# Patient Record
Sex: FEMALE | Race: WHITE | NOT HISPANIC OR LATINO | Employment: OTHER | RURAL
[De-identification: names, ages, dates, MRNs, and addresses within clinical notes are randomized per-mention and may not be internally consistent; named-entity substitution may affect disease eponyms.]

---

## 2018-05-09 ENCOUNTER — HISTORICAL (OUTPATIENT)
Dept: ADMINISTRATIVE | Facility: HOSPITAL | Age: 83
End: 2018-05-09

## 2018-05-10 LAB
LAB AP CLINICAL INFORMATION: NORMAL
LAB AP DIAGNOSIS - HISTORICAL: NORMAL
LAB AP GENERAL CAT - HISTORICAL: NEGATIVE
LAB AP PREPARATIONS - HISTORICAL: NORMAL
LAB AP SPECIMEN SUBMITTED - HISTORICAL: NORMAL

## 2020-06-13 ENCOUNTER — HISTORICAL (OUTPATIENT)
Dept: ADMINISTRATIVE | Facility: HOSPITAL | Age: 85
End: 2020-06-13

## 2020-06-13 LAB
ALBUMIN SERPL BCP-MCNC: 3.5 G/DL (ref 3.5–5)
ALBUMIN/GLOB SERPL: 1 {RATIO}
ALP SERPL-CCNC: 71 U/L (ref 55–142)
ALT SERPL W P-5'-P-CCNC: 19 U/L (ref 13–56)
APTT PPP: 38.2 SECONDS (ref 25.2–37.3)
AST SERPL W P-5'-P-CCNC: 21 U/L (ref 15–37)
BASOPHILS # BLD AUTO: 0.04 X10E3/UL (ref 0–0.2)
BASOPHILS NFR BLD AUTO: 0.7 % (ref 0–1)
BILIRUB SERPL-MCNC: 0.4 MG/DL (ref 0–1.2)
BUN SERPL-MCNC: 23 MG/DL (ref 7–18)
BUN/CREAT SERPL: 18.5
CALCIUM SERPL-MCNC: 8.8 MG/DL (ref 8.5–10.1)
CHLORIDE SERPL-SCNC: 101 MMOL/L (ref 98–107)
CK MB SERPL-MCNC: 1 NG/ML (ref 1–3.6)
CK SERPL-CCNC: 99 U/L (ref 26–192)
CO2 SERPL-SCNC: 34 MMOL/L (ref 21–32)
CREAT SERPL-MCNC: 1.24 MG/DL (ref 0.55–1.02)
EOSINOPHIL # BLD AUTO: 0.46 X10E3/UL (ref 0–0.5)
EOSINOPHIL NFR BLD AUTO: 7.8 % (ref 1–4)
ERYTHROCYTE [DISTWIDTH] IN BLOOD BY AUTOMATED COUNT: 13.8 % (ref 11.5–14.5)
GLOBULIN SER-MCNC: 3.4 G/DL (ref 2–4)
GLUCOSE SERPL-MCNC: 113 MG/DL (ref 74–106)
HCT VFR BLD AUTO: 34.1 % (ref 38–47)
HGB BLD-MCNC: 10.4 G/DL (ref 12–16)
IMM GRANULOCYTES # BLD AUTO: 0.03 X10E3/UL (ref 0–0.04)
IMM GRANULOCYTES NFR BLD: 0.5 % (ref 0–0.4)
INR BLD: 1.33 (ref 0–3.3)
LYMPHOCYTES # BLD AUTO: 1.12 X10E3/UL (ref 1–4.8)
LYMPHOCYTES NFR BLD AUTO: 19 % (ref 27–41)
MAGNESIUM SERPL-MCNC: 2.1 MG/DL (ref 1.7–2.3)
MCH RBC QN AUTO: 28 PG (ref 27–31)
MCHC RBC AUTO-ENTMCNC: 30.5 G/DL (ref 32–36)
MCV RBC AUTO: 91.7 FL (ref 80–96)
MONOCYTES # BLD AUTO: 0.68 X10E3/UL (ref 0–0.8)
MONOCYTES NFR BLD AUTO: 11.5 % (ref 2–6)
MPC BLD CALC-MCNC: 9.3 FL (ref 9.4–12.4)
MYOGLOBIN SERPL-MCNC: 71 NG/ML (ref 13–71)
NEUTROPHILS # BLD AUTO: 3.57 X10E3/UL (ref 1.8–7.7)
NEUTROPHILS NFR BLD AUTO: 60.5 % (ref 53–65)
NRBC # BLD AUTO: 0 X10E3/UL (ref 0–0)
NRBC, AUTO (.00): 0 /100 (ref 0–0)
NT-PROBNP SERPL-MCNC: 2503 PG/ML (ref 1–450)
PLATELET # BLD AUTO: 148 X10E3/UL (ref 150–400)
POTASSIUM SERPL-SCNC: 3.7 MMOL/L (ref 3.5–5.1)
PROT SERPL-MCNC: 6.9 G/DL (ref 6.4–8.2)
PROTHROMBIN TIME: 16.6 SECONDS (ref 11.7–14.7)
RBC # BLD AUTO: 3.72 X10E6/UL (ref 4.2–5.4)
SODIUM SERPL-SCNC: 138 MMOL/L (ref 136–145)
TROPONIN I SERPL-MCNC: 0.05 NG/ML (ref 0–0.06)
TSH SERPL DL<=0.005 MIU/L-ACNC: 1.42 UIU/ML (ref 0.36–3.74)
WBC # BLD AUTO: 5.9 X10E3/UL (ref 4.5–11)

## 2020-06-14 ENCOUNTER — HISTORICAL (OUTPATIENT)
Dept: ADMINISTRATIVE | Facility: HOSPITAL | Age: 85
End: 2020-06-14

## 2020-06-14 LAB
BACTERIA #/AREA URNS HPF: ABNORMAL /HPF
BILIRUB UR QL STRIP: ABNORMAL MG/DL
CHOLEST SERPL-MCNC: 139 MG/DL
CHOLEST/HDLC SERPL: 1.8 {RATIO}
CLARITY UR: ABNORMAL
CLARITY UR: ABNORMAL
COLOR UR: ABNORMAL
COLOR UR: ABNORMAL
GLUCOSE UR STRIP-MCNC: NEGATIVE MG/DL
HDLC SERPL-MCNC: 78 MG/DL
KETONES UR STRIP-SCNC: ABNORMAL MG/DL
LDLC SERPL CALC-MCNC: 53 MG/DL
LEUKOCYTE ESTERASE UR QL STRIP: ABNORMAL LEU/UL
NITRITE UR QL STRIP: NEGATIVE
PH UR STRIP: 5 PH UNITS (ref 5–8)
PROT UR QL STRIP: 100 MG/DL
RBC # UR STRIP: ABNORMAL ERY/UL
RBC #/AREA URNS HPF: ABNORMAL /HPF (ref 0–3)
SP GR UR STRIP: 1.02 (ref 1–1.03)
SQUAMOUS #/AREA URNS LPF: ABNORMAL /LPF
TRIGL SERPL-MCNC: 38 MG/DL
TROPONIN I SERPL-MCNC: 0.05 NG/ML (ref 0–0.06)
UROBILINOGEN UR STRIP-ACNC: 0.2 EU/DL
WBC #/AREA URNS HPF: ABNORMAL /HPF (ref 0–5)

## 2020-06-15 ENCOUNTER — HISTORICAL (OUTPATIENT)
Dept: ADMINISTRATIVE | Facility: HOSPITAL | Age: 85
End: 2020-06-15

## 2020-06-15 LAB
BASOPHILS # BLD AUTO: 0.04 X10E3/UL (ref 0–0.2)
BASOPHILS NFR BLD AUTO: 0.7 % (ref 0–1)
BUN SERPL-MCNC: 29 MG/DL (ref 7–18)
CALCIUM SERPL-MCNC: 8.8 MG/DL (ref 8.5–10.1)
CHLORIDE SERPL-SCNC: 101 MMOL/L (ref 98–107)
CHOLEST SERPL-MCNC: 136 MG/DL
CHOLEST/HDLC SERPL: 1.8 {RATIO}
CK MB SERPL-MCNC: <1 NG/ML (ref 1–3.6)
CK SERPL-CCNC: 112 U/L (ref 26–192)
CO2 SERPL-SCNC: 32 MMOL/L (ref 21–32)
CREAT SERPL-MCNC: 1.69 MG/DL (ref 0.55–1.02)
CRP SERPL-MCNC: 10.1 UG/ML (ref 0–0.8)
EOSINOPHIL # BLD AUTO: 0.51 X10E3/UL (ref 0–0.5)
EOSINOPHIL NFR BLD AUTO: 8.7 % (ref 1–4)
ERYTHROCYTE [DISTWIDTH] IN BLOOD BY AUTOMATED COUNT: 14.1 % (ref 11.5–14.5)
ERYTHROCYTE [SEDIMENTATION RATE] IN BLOOD BY WESTERGREN METHOD: 22 MM/HR (ref 0–42)
GLUCOSE SERPL-MCNC: 109 MG/DL (ref 70–105)
GLUCOSE SERPL-MCNC: 98 MG/DL (ref 74–106)
HCT VFR BLD AUTO: 36.8 % (ref 38–47)
HDLC SERPL-MCNC: 75 MG/DL
HGB BLD-MCNC: 10.9 G/DL (ref 12–16)
IMM GRANULOCYTES # BLD AUTO: 0.01 X10E3/UL (ref 0–0.04)
IMM GRANULOCYTES NFR BLD: 0.2 % (ref 0–0.4)
LDLC SERPL CALC-MCNC: 51 MG/DL
LYMPHOCYTES # BLD AUTO: 1.33 X10E3/UL (ref 1–4.8)
LYMPHOCYTES NFR BLD AUTO: 22.7 % (ref 27–41)
MCH RBC QN AUTO: 28.2 PG (ref 27–31)
MCHC RBC AUTO-ENTMCNC: 29.6 G/DL (ref 32–36)
MCV RBC AUTO: 95.1 FL (ref 80–96)
MONOCYTES # BLD AUTO: 0.62 X10E3/UL (ref 0–0.8)
MONOCYTES NFR BLD AUTO: 10.6 % (ref 2–6)
MPC BLD CALC-MCNC: 10 FL (ref 9.4–12.4)
NEUTROPHILS # BLD AUTO: 3.36 X10E3/UL (ref 1.8–7.7)
NEUTROPHILS NFR BLD AUTO: 57.1 % (ref 53–65)
NRBC # BLD AUTO: 0 X10E3/UL (ref 0–0)
NRBC, AUTO (.00): 0 /100 (ref 0–0)
PLATELET # BLD AUTO: 144 X10E3/UL (ref 150–400)
POTASSIUM SERPL-SCNC: 3.4 MMOL/L (ref 3.5–5.1)
RBC # BLD AUTO: 3.87 X10E6/UL (ref 4.2–5.4)
SODIUM SERPL-SCNC: 138 MMOL/L (ref 136–145)
TRIGL SERPL-MCNC: 50 MG/DL
TROPONIN I SERPL-MCNC: 0.04 NG/ML (ref 0–0.06)
WBC # BLD AUTO: 5.87 X10E3/UL (ref 4.5–11)

## 2020-06-18 LAB
REPORT: 38
REPORT: NORMAL

## 2020-10-02 ENCOUNTER — HISTORICAL (OUTPATIENT)
Dept: ADMINISTRATIVE | Facility: HOSPITAL | Age: 85
End: 2020-10-02

## 2020-10-03 ENCOUNTER — HISTORICAL (OUTPATIENT)
Dept: ADMINISTRATIVE | Facility: HOSPITAL | Age: 85
End: 2020-10-03

## 2020-10-05 LAB
APTT PPP: 33.5 SECONDS (ref 25.2–37.3)
INR BLD: 1.21 (ref 0–3.3)
PROTHROMBIN TIME: 14.7 SECONDS (ref 11.7–14.7)

## 2020-10-06 LAB
ABO: NORMAL
ANTIBODY SCREEN: NEGATIVE
RH TYPE: POSITIVE

## 2020-10-09 LAB
ANION GAP SERPL CALCULATED.3IONS-SCNC: 12 MMOL/L
BUN SERPL-MCNC: 36 MG/DL (ref 7–18)
CALCIUM SERPL-MCNC: 8.2 MG/DL (ref 8.5–10.1)
CHLORIDE SERPL-SCNC: 110 MMOL/L (ref 98–107)
CO2 SERPL-SCNC: 27 MMOL/L (ref 21–32)
CREAT SERPL-MCNC: 1.48 MG/DL (ref 0.55–1.02)
GLUCOSE SERPL-MCNC: 134 MG/DL (ref 74–106)
POTASSIUM SERPL-SCNC: 4.7 MMOL/L (ref 3.5–5.1)
SODIUM SERPL-SCNC: 144 MMOL/L (ref 136–145)

## 2020-10-10 LAB
ALBUMIN SERPL BCP-MCNC: 3.8 G/DL (ref 3.5–5)
ALBUMIN SERPL BCP-MCNC: 4 G/DL (ref 3.5–5)
ALBUMIN/GLOB SERPL: 1.1 {RATIO}
ALBUMIN/GLOB SERPL: 1.1 {RATIO}
ALP SERPL-CCNC: 66 U/L (ref 55–142)
ALP SERPL-CCNC: 69 U/L (ref 55–142)
ALT SERPL W P-5'-P-CCNC: 23 U/L (ref 13–56)
ALT SERPL W P-5'-P-CCNC: 24 U/L (ref 13–56)
ANION GAP SERPL CALCULATED.3IONS-SCNC: 15 MMOL/L
ANION GAP SERPL CALCULATED.3IONS-SCNC: 8 MMOL/L
ANISOCYTOSIS BLD QL SMEAR: ABNORMAL
AST SERPL W P-5'-P-CCNC: 25 U/L (ref 15–37)
AST SERPL W P-5'-P-CCNC: 30 U/L (ref 15–37)
BASOPHILS # BLD AUTO: 0 X10E3/UL (ref 0–0.2)
BASOPHILS # BLD AUTO: 0.02 X10E3/UL (ref 0–0.2)
BASOPHILS # BLD AUTO: 0.03 X10E3/UL (ref 0–0.2)
BASOPHILS NFR BLD AUTO: 0 % (ref 0–1)
BASOPHILS NFR BLD AUTO: 0.2 % (ref 0–1)
BASOPHILS NFR BLD AUTO: 0.4 % (ref 0–1)
BILIRUB SERPL-MCNC: 0.3 MG/DL (ref 0–1.2)
BILIRUB SERPL-MCNC: 0.5 MG/DL (ref 0–1.2)
BUN SERPL-MCNC: 41 MG/DL (ref 7–18)
BUN SERPL-MCNC: 42 MG/DL (ref 7–18)
BUN/CREAT SERPL: 20.1
BUN/CREAT SERPL: 20.7
CALCIUM SERPL-MCNC: 8.7 MG/DL (ref 8.5–10.1)
CALCIUM SERPL-MCNC: 8.9 MG/DL (ref 8.5–10.1)
CHLORIDE SERPL-SCNC: 102 MMOL/L (ref 98–107)
CHLORIDE SERPL-SCNC: 104 MMOL/L (ref 98–107)
CO2 SERPL-SCNC: 29 MMOL/L (ref 21–32)
CO2 SERPL-SCNC: 35 MMOL/L (ref 21–32)
CREAT SERPL-MCNC: 2.03 MG/DL (ref 0.55–1.02)
CREAT SERPL-MCNC: 2.04 MG/DL (ref 0.55–1.02)
EOSINOPHIL # BLD AUTO: 0 X10E3/UL (ref 0–0.5)
EOSINOPHIL # BLD AUTO: 0.01 X10E3/UL (ref 0–0.5)
EOSINOPHIL # BLD AUTO: 0.05 X10E3/UL (ref 0–0.5)
EOSINOPHIL NFR BLD AUTO: 0 % (ref 1–4)
EOSINOPHIL NFR BLD AUTO: 0.1 % (ref 1–4)
EOSINOPHIL NFR BLD AUTO: 0.6 % (ref 1–4)
ERYTHROCYTE [DISTWIDTH] IN BLOOD BY AUTOMATED COUNT: 14.2 % (ref 11.5–14.5)
ERYTHROCYTE [DISTWIDTH] IN BLOOD BY AUTOMATED COUNT: 14.3 % (ref 11.5–14.5)
ERYTHROCYTE [DISTWIDTH] IN BLOOD BY AUTOMATED COUNT: 14.5 % (ref 11.5–14.5)
GLOBULIN SER-MCNC: 3.6 G/DL (ref 2–4)
GLOBULIN SER-MCNC: 3.7 G/DL (ref 2–4)
GLUCOSE SERPL-MCNC: 129 MG/DL (ref 74–106)
GLUCOSE SERPL-MCNC: 145 MG/DL (ref 74–106)
HCT VFR BLD AUTO: 32.4 % (ref 38–47)
HCT VFR BLD AUTO: 35.7 % (ref 38–47)
HCT VFR BLD AUTO: 36 % (ref 38–47)
HGB BLD-MCNC: 10 G/DL (ref 12–16)
HGB BLD-MCNC: 11.1 G/DL (ref 12–16)
HGB BLD-MCNC: 11.5 G/DL (ref 12–16)
IMM GRANULOCYTES # BLD AUTO: 0.02 X10E3/UL (ref 0–0.04)
IMM GRANULOCYTES # BLD AUTO: 0.02 X10E3/UL (ref 0–0.04)
IMM GRANULOCYTES # BLD AUTO: 0.03 X10E3/UL (ref 0–0.04)
IMM GRANULOCYTES NFR BLD: 0.2 % (ref 0–0.4)
IMM GRANULOCYTES NFR BLD: 0.3 % (ref 0–0.4)
IMM GRANULOCYTES NFR BLD: 0.6 % (ref 0–0.4)
LYMPHOCYTES # BLD AUTO: 0.6 X10E3/UL (ref 1–4.8)
LYMPHOCYTES # BLD AUTO: 0.86 X10E3/UL (ref 1–4.8)
LYMPHOCYTES # BLD AUTO: 5.86 X10E3/UL (ref 1–4.8)
LYMPHOCYTES NFR BLD AUTO: 10.2 % (ref 27–41)
LYMPHOCYTES NFR BLD AUTO: 12 % (ref 27–41)
LYMPHOCYTES NFR BLD AUTO: 12.7 % (ref 27–41)
LYMPHOCYTES NFR BLD MANUAL: 10 % (ref 27–41)
MCH RBC QN AUTO: 28.4 PG (ref 27–31)
MCH RBC QN AUTO: 28.6 PG (ref 27–31)
MCH RBC QN AUTO: 28.9 PG (ref 27–31)
MCHC RBC AUTO-ENTMCNC: 30.9 G/DL (ref 32–36)
MCHC RBC AUTO-ENTMCNC: 31.1 G/DL (ref 32–36)
MCHC RBC AUTO-ENTMCNC: 31.9 G/DL (ref 32–36)
MCV RBC AUTO: 90.5 FL (ref 80–96)
MCV RBC AUTO: 91.3 FL (ref 80–96)
MCV RBC AUTO: 92.6 FL (ref 80–96)
MONOCYTES # BLD AUTO: 0.38 X10E3/UL (ref 0–0.8)
MONOCYTES # BLD AUTO: 0.47 X10E3/UL (ref 0–0.8)
MONOCYTES # BLD AUTO: 0.5 X10E3/UL (ref 0–0.8)
MONOCYTES NFR BLD AUTO: 10 % (ref 2–6)
MONOCYTES NFR BLD AUTO: 5.3 % (ref 2–6)
MONOCYTES NFR BLD AUTO: 6 % (ref 2–6)
MONOCYTES NFR BLD MANUAL: 7 % (ref 2–6)
MPC BLD CALC-MCNC: 10.1 FL (ref 9.4–12.4)
MPC BLD CALC-MCNC: 9.3 FL (ref 9.4–12.4)
MPC BLD CALC-MCNC: 9.5 FL (ref 9.4–12.4)
NEUTROPHILS # BLD AUTO: 0 X10E3/UL (ref 1.8–7.7)
NEUTROPHILS # BLD AUTO: 3.61 X10E3/UL (ref 1.8–7.7)
NEUTROPHILS # BLD AUTO: 6.95 X10E3/UL (ref 1.8–7.7)
NEUTROPHILS NFR BLD AUTO: 76.7 % (ref 53–65)
NEUTROPHILS NFR BLD AUTO: 81.9 % (ref 53–65)
NEUTROPHILS NFR BLD AUTO: 82.8 % (ref 53–65)
NEUTS BAND NFR BLD MANUAL: 2 % (ref 1–5)
NEUTS SEG NFR BLD MANUAL: 81 % (ref 50–62)
NRBC # BLD AUTO: 0 X10E3/UL (ref 0–0)
NRBC, AUTO (.00): 0 /100 (ref 0–0)
OVALOCYTES BLD QL SMEAR: ABNORMAL
PLATELET # BLD AUTO: 141 X10E3/UL (ref 150–400)
PLATELET # BLD AUTO: 148 X10E3/UL (ref 150–400)
PLATELET # BLD AUTO: 151 X10E3/UL (ref 150–400)
PLATELET MORPHOLOGY: NORMAL
POTASSIUM SERPL-SCNC: 4.1 MMOL/L (ref 3.5–5.1)
POTASSIUM SERPL-SCNC: 4.2 MMOL/L (ref 3.5–5.1)
PROT SERPL-MCNC: 7.4 G/DL (ref 6.4–8.2)
PROT SERPL-MCNC: 7.7 G/DL (ref 6.4–8.2)
RBC # BLD AUTO: 3.5 X10E6/UL (ref 4.2–5.4)
RBC # BLD AUTO: 3.91 X10E6/UL (ref 4.2–5.4)
RBC # BLD AUTO: 3.98 X10E6/UL (ref 4.2–5.4)
SODIUM SERPL-SCNC: 142 MMOL/L (ref 136–145)
SODIUM SERPL-SCNC: 143 MMOL/L (ref 136–145)
WBC # BLD AUTO: 4.71 X10E3/UL (ref 4.5–11)
WBC # BLD AUTO: 7.16 X10E3/UL (ref 4.5–11)
WBC # BLD AUTO: 8.4 X10E3/UL (ref 4.5–11)

## 2020-11-19 ENCOUNTER — HISTORICAL (OUTPATIENT)
Dept: ADMINISTRATIVE | Facility: HOSPITAL | Age: 85
End: 2020-11-19

## 2021-01-05 ENCOUNTER — HISTORICAL (OUTPATIENT)
Dept: ADMINISTRATIVE | Facility: HOSPITAL | Age: 86
End: 2021-01-05

## 2021-01-05 LAB
BACTERIA #/AREA URNS HPF: ABNORMAL /HPF
BASOPHILS # BLD AUTO: 0.05 X10E3/UL (ref 0–0.2)
BASOPHILS NFR BLD AUTO: 0.8 % (ref 0–1)
BILIRUB UR QL STRIP: NEGATIVE MG/DL
CLARITY UR: CLEAR
COLOR UR: YELLOW
EOSINOPHIL # BLD AUTO: 0.32 X10E3/UL (ref 0–0.5)
EOSINOPHIL NFR BLD AUTO: 5.2 % (ref 1–4)
ERYTHROCYTE [DISTWIDTH] IN BLOOD BY AUTOMATED COUNT: 13.2 % (ref 11.5–14.5)
GLUCOSE UR STRIP-MCNC: NEGATIVE MG/DL
HCT VFR BLD AUTO: 33.1 % (ref 38–47)
HGB BLD-MCNC: 10.2 G/DL (ref 12–16)
HYALINE CASTS #/AREA URNS LPF: ABNORMAL /LPF (ref 0–2)
IMM GRANULOCYTES # BLD AUTO: 0.01 X10E3/UL (ref 0–0.04)
IMM GRANULOCYTES NFR BLD: 0.2 % (ref 0–0.4)
KETONES UR STRIP-SCNC: NEGATIVE MG/DL
LEUKOCYTE ESTERASE UR QL STRIP: NEGATIVE LEU/UL
LYMPHOCYTES # BLD AUTO: 1.28 X10E3/UL (ref 1–4.8)
LYMPHOCYTES NFR BLD AUTO: 20.7 % (ref 27–41)
MCH RBC QN AUTO: 28.2 PG (ref 27–31)
MCHC RBC AUTO-ENTMCNC: 30.8 G/DL (ref 32–36)
MCV RBC AUTO: 91.4 FL (ref 80–96)
MONOCYTES # BLD AUTO: 0.58 X10E3/UL (ref 0–0.8)
MONOCYTES NFR BLD AUTO: 9.4 % (ref 2–6)
MPC BLD CALC-MCNC: 9.7 FL (ref 9.4–12.4)
NEUTROPHILS # BLD AUTO: 3.95 X10E3/UL (ref 1.8–7.7)
NEUTROPHILS NFR BLD AUTO: 63.7 % (ref 53–65)
NITRITE UR QL STRIP: NEGATIVE
NRBC # BLD AUTO: 0 X10E3/UL (ref 0–0)
NRBC, AUTO (.00): 0 /100 (ref 0–0)
PH UR STRIP: 5 PH UNITS (ref 5–8)
PLATELET # BLD AUTO: 196 X10E3/UL (ref 150–400)
PROT UR QL STRIP: NEGATIVE MG/DL
RBC # BLD AUTO: 3.62 X10E6/UL (ref 4.2–5.4)
RBC # UR STRIP: ABNORMAL ERY/UL
RBC #/AREA URNS HPF: ABNORMAL /HPF (ref 0–3)
SP GR UR STRIP: 1.01 (ref 1–1.03)
SQUAMOUS #/AREA URNS LPF: ABNORMAL /LPF
UROBILINOGEN UR STRIP-ACNC: 0.2 EU/DL
WBC # BLD AUTO: 6.19 X10E3/UL (ref 4.5–11)
WBC #/AREA URNS HPF: ABNORMAL /HPF (ref 0–5)

## 2021-01-07 LAB
REPORT: NO GROWTH
REPORT: NORMAL

## 2021-03-31 RX ORDER — OLMESARTAN MEDOXOMIL 20 MG/1
20 TABLET ORAL DAILY
COMMUNITY
Start: 2021-03-04 | End: 2021-03-31 | Stop reason: SDUPTHER

## 2021-03-31 RX ORDER — DILTIAZEM HYDROCHLORIDE 180 MG/1
180 CAPSULE, COATED, EXTENDED RELEASE ORAL DAILY
COMMUNITY
Start: 2021-03-04 | End: 2021-03-31 | Stop reason: SDUPTHER

## 2021-03-31 RX ORDER — FUROSEMIDE 40 MG/1
40 TABLET ORAL DAILY
COMMUNITY
Start: 2021-01-26 | End: 2021-03-31 | Stop reason: SDUPTHER

## 2021-03-31 RX ORDER — DIAZEPAM 2 MG/1
TABLET ORAL
COMMUNITY
Start: 2021-03-08 | End: 2021-03-31 | Stop reason: SDUPTHER

## 2021-03-31 RX ORDER — MEMANTINE HYDROCHLORIDE AND DONEPEZIL HYDROCHLORIDE 28; 10 MG/1; MG/1
CAPSULE ORAL
COMMUNITY
Start: 2021-03-04 | End: 2021-03-31 | Stop reason: SDUPTHER

## 2021-03-31 RX ORDER — APIXABAN 2.5 MG/1
2.5 TABLET, FILM COATED ORAL 2 TIMES DAILY
COMMUNITY
Start: 2021-03-08 | End: 2021-03-31 | Stop reason: SDUPTHER

## 2021-03-31 RX ORDER — LEVOTHYROXINE SODIUM 100 UG/1
TABLET ORAL
COMMUNITY
Start: 2021-03-04 | End: 2021-03-31 | Stop reason: SDUPTHER

## 2021-03-31 RX ORDER — ISOSORBIDE MONONITRATE 60 MG/1
60 TABLET, EXTENDED RELEASE ORAL DAILY
COMMUNITY
Start: 2021-03-04 | End: 2021-03-31 | Stop reason: SDUPTHER

## 2021-03-31 RX ORDER — NEBIVOLOL HYDROCHLORIDE 5 MG/1
5 TABLET ORAL DAILY
COMMUNITY
Start: 2021-03-04 | End: 2021-03-31 | Stop reason: SDUPTHER

## 2021-04-01 RX ORDER — DIAZEPAM 2 MG/1
TABLET ORAL
Qty: 90 TABLET | Refills: 1 | Status: SHIPPED | OUTPATIENT
Start: 2021-04-01 | End: 2021-04-06 | Stop reason: SDUPTHER

## 2021-04-01 RX ORDER — DILTIAZEM HYDROCHLORIDE 180 MG/1
180 CAPSULE, COATED, EXTENDED RELEASE ORAL DAILY
Qty: 90 CAPSULE | Refills: 3 | Status: SHIPPED | OUTPATIENT
Start: 2021-04-01 | End: 2022-01-01 | Stop reason: SDUPTHER

## 2021-04-01 RX ORDER — OLMESARTAN MEDOXOMIL 20 MG/1
20 TABLET ORAL DAILY
Qty: 90 TABLET | Refills: 3 | Status: SHIPPED | OUTPATIENT
Start: 2021-04-01 | End: 2022-01-01 | Stop reason: SDUPTHER

## 2021-04-01 RX ORDER — NEBIVOLOL HYDROCHLORIDE 5 MG/1
5 TABLET ORAL DAILY
Qty: 90 TABLET | Refills: 3 | Status: SHIPPED | OUTPATIENT
Start: 2021-04-01 | End: 2022-01-01

## 2021-04-01 RX ORDER — MEMANTINE HYDROCHLORIDE AND DONEPEZIL HYDROCHLORIDE 28; 10 MG/1; MG/1
CAPSULE ORAL
Qty: 90 EACH | Refills: 3 | Status: SHIPPED | OUTPATIENT
Start: 2021-04-01 | End: 2022-01-01

## 2021-04-01 RX ORDER — APIXABAN 2.5 MG/1
2.5 TABLET, FILM COATED ORAL 2 TIMES DAILY
Qty: 90 TABLET | Refills: 11 | Status: SHIPPED | OUTPATIENT
Start: 2021-04-01 | End: 2022-01-01 | Stop reason: SDUPTHER

## 2021-04-01 RX ORDER — LEVOTHYROXINE SODIUM 100 UG/1
TABLET ORAL
Qty: 90 TABLET | Refills: 3 | Status: SHIPPED | OUTPATIENT
Start: 2021-04-01 | End: 2022-01-01 | Stop reason: SDUPTHER

## 2021-04-01 RX ORDER — FUROSEMIDE 40 MG/1
40 TABLET ORAL DAILY
Qty: 90 TABLET | Refills: 3 | Status: SHIPPED | OUTPATIENT
Start: 2021-04-01 | End: 2022-01-01 | Stop reason: SDUPTHER

## 2021-04-01 RX ORDER — ISOSORBIDE MONONITRATE 60 MG/1
60 TABLET, EXTENDED RELEASE ORAL DAILY
Qty: 90 TABLET | Refills: 3 | Status: SHIPPED | OUTPATIENT
Start: 2021-04-01 | End: 2022-01-01 | Stop reason: SDUPTHER

## 2021-04-07 RX ORDER — DIAZEPAM 2 MG/1
TABLET ORAL
Qty: 90 TABLET | Refills: 1 | Status: SHIPPED | OUTPATIENT
Start: 2021-04-07 | End: 2021-10-08 | Stop reason: SDUPTHER

## 2021-10-08 ENCOUNTER — TELEPHONE (OUTPATIENT)
Dept: FAMILY MEDICINE | Facility: CLINIC | Age: 86
End: 2021-10-08

## 2021-10-08 RX ORDER — DIAZEPAM 2 MG/1
TABLET ORAL
Qty: 30 TABLET | Refills: 0 | Status: SHIPPED | OUTPATIENT
Start: 2021-10-08 | End: 2021-10-25 | Stop reason: SDUPTHER

## 2021-10-14 ENCOUNTER — CLINICAL SUPPORT (OUTPATIENT)
Dept: FAMILY MEDICINE | Facility: CLINIC | Age: 86
End: 2021-10-14
Payer: MEDICARE

## 2021-10-14 DIAGNOSIS — Z23 NEED FOR PROPHYLACTIC VACCINATION AND INOCULATION AGAINST CHOLERA ALONE: Primary | ICD-10-CM

## 2021-10-14 PROCEDURE — G0008 FLU VACCINE - QUADRIVALENT - HIGH DOSE (65+) PRESERVATIVE FREE IM: ICD-10-PCS | Mod: ,,, | Performed by: FAMILY MEDICINE

## 2021-10-14 PROCEDURE — G0008 ADMIN INFLUENZA VIRUS VAC: HCPCS | Mod: ,,, | Performed by: FAMILY MEDICINE

## 2021-10-14 PROCEDURE — 90662 FLU VACCINE - QUADRIVALENT - HIGH DOSE (65+) PRESERVATIVE FREE IM: ICD-10-PCS | Mod: ,,, | Performed by: FAMILY MEDICINE

## 2021-10-14 PROCEDURE — 90662 IIV NO PRSV INCREASED AG IM: CPT | Mod: ,,, | Performed by: FAMILY MEDICINE

## 2021-10-25 RX ORDER — DIAZEPAM 2 MG/1
TABLET ORAL
Qty: 30 TABLET | Refills: 1 | Status: SHIPPED | OUTPATIENT
Start: 2021-10-25 | End: 2022-01-17 | Stop reason: SDUPTHER

## 2021-11-11 RX ORDER — AZITHROMYCIN 250 MG/1
250 TABLET, FILM COATED ORAL DAILY
Qty: 6 TABLET | Refills: 0 | Status: SHIPPED | OUTPATIENT
Start: 2021-11-11 | End: 2022-01-01 | Stop reason: SDUPTHER

## 2021-11-11 RX ORDER — AZITHROMYCIN 250 MG/1
250 TABLET, FILM COATED ORAL DAILY
COMMUNITY
End: 2021-11-11 | Stop reason: SDUPTHER

## 2022-01-01 ENCOUNTER — TELEPHONE (OUTPATIENT)
Dept: FAMILY MEDICINE | Facility: CLINIC | Age: 87
End: 2022-01-01
Payer: MEDICARE

## 2022-01-01 ENCOUNTER — OFFICE VISIT (OUTPATIENT)
Dept: FAMILY MEDICINE | Facility: CLINIC | Age: 87
End: 2022-01-01
Payer: MEDICARE

## 2022-01-01 VITALS
DIASTOLIC BLOOD PRESSURE: 60 MMHG | WEIGHT: 115 LBS | BODY MASS INDEX: 21.16 KG/M2 | HEIGHT: 62 IN | RESPIRATION RATE: 18 BRPM | OXYGEN SATURATION: 93 % | HEART RATE: 66 BPM | SYSTOLIC BLOOD PRESSURE: 90 MMHG

## 2022-01-01 DIAGNOSIS — I10 HYPERTENSION, UNSPECIFIED TYPE: ICD-10-CM

## 2022-01-01 DIAGNOSIS — Z71.89 COMPLEX CARE COORDINATION: ICD-10-CM

## 2022-01-01 DIAGNOSIS — Z23 NEED FOR INFLUENZA VACCINATION: ICD-10-CM

## 2022-01-01 DIAGNOSIS — I48.91 ATRIAL FIBRILLATION, UNSPECIFIED TYPE: Primary | ICD-10-CM

## 2022-01-01 DIAGNOSIS — N30.00 ACUTE CYSTITIS WITHOUT HEMATURIA: Primary | ICD-10-CM

## 2022-01-01 DIAGNOSIS — E78.5 HYPERLIPIDEMIA, UNSPECIFIED HYPERLIPIDEMIA TYPE: ICD-10-CM

## 2022-01-01 DIAGNOSIS — K21.9 GASTROESOPHAGEAL REFLUX DISEASE, UNSPECIFIED WHETHER ESOPHAGITIS PRESENT: ICD-10-CM

## 2022-01-01 DIAGNOSIS — E03.9 HYPOTHYROIDISM, UNSPECIFIED TYPE: ICD-10-CM

## 2022-01-01 DIAGNOSIS — I10 ESSENTIAL HYPERTENSION: Primary | ICD-10-CM

## 2022-01-01 DIAGNOSIS — F41.1 GENERALIZED ANXIETY DISORDER: Primary | ICD-10-CM

## 2022-01-01 DIAGNOSIS — F41.1 GENERALIZED ANXIETY DISORDER: ICD-10-CM

## 2022-01-01 DIAGNOSIS — E03.9 HYPOTHYROIDISM, UNSPECIFIED TYPE: Primary | ICD-10-CM

## 2022-01-01 DIAGNOSIS — F03.94 ANXIETY DUE TO DEMENTIA: ICD-10-CM

## 2022-01-01 DIAGNOSIS — I48.91 ATRIAL FIBRILLATION, UNSPECIFIED TYPE: ICD-10-CM

## 2022-01-01 DIAGNOSIS — R30.0 DYSURIA: ICD-10-CM

## 2022-01-01 DIAGNOSIS — I10 ESSENTIAL HYPERTENSION: ICD-10-CM

## 2022-01-01 DIAGNOSIS — Z00.00 ROUTINE MEDICAL EXAM: ICD-10-CM

## 2022-01-01 LAB
ALBUMIN SERPL BCP-MCNC: 3.8 G/DL (ref 3.5–5)
ALBUMIN/GLOB SERPL: 1.2 {RATIO}
ALP SERPL-CCNC: 87 U/L (ref 55–142)
ALT SERPL W P-5'-P-CCNC: 13 U/L (ref 13–56)
ANION GAP SERPL CALCULATED.3IONS-SCNC: 14 MMOL/L (ref 7–16)
AST SERPL W P-5'-P-CCNC: 14 U/L (ref 15–37)
BASOPHILS # BLD AUTO: 0.05 K/UL (ref 0–0.2)
BASOPHILS NFR BLD AUTO: 0.9 % (ref 0–1)
BILIRUB SERPL-MCNC: 0.3 MG/DL (ref ?–1.2)
BUN SERPL-MCNC: 55 MG/DL (ref 7–18)
BUN/CREAT SERPL: 23 (ref 6–20)
CALCIUM SERPL-MCNC: 9.1 MG/DL (ref 8.5–10.1)
CHLORIDE SERPL-SCNC: 99 MMOL/L (ref 98–107)
CO2 SERPL-SCNC: 28 MMOL/L (ref 21–32)
CREAT SERPL-MCNC: 2.44 MG/DL (ref 0.55–1.02)
DIFFERENTIAL METHOD BLD: ABNORMAL
EGFR (NO RACE VARIABLE) (RUSH/TITUS): 18 ML/MIN/1.73M²
EOSINOPHIL # BLD AUTO: 0.45 K/UL (ref 0–0.5)
EOSINOPHIL NFR BLD AUTO: 7.8 % (ref 1–4)
ERYTHROCYTE [DISTWIDTH] IN BLOOD BY AUTOMATED COUNT: 13.3 % (ref 11.5–14.5)
GLOBULIN SER-MCNC: 3.3 G/DL (ref 2–4)
GLUCOSE SERPL-MCNC: 152 MG/DL (ref 74–106)
HCT VFR BLD AUTO: 37.3 % (ref 38–47)
HGB BLD-MCNC: 11.5 G/DL (ref 12–16)
IMM GRANULOCYTES # BLD AUTO: 0.01 K/UL (ref 0–0.04)
IMM GRANULOCYTES NFR BLD: 0.2 % (ref 0–0.4)
LYMPHOCYTES # BLD AUTO: 1.51 K/UL (ref 1–4.8)
LYMPHOCYTES NFR BLD AUTO: 26.1 % (ref 27–41)
MCH RBC QN AUTO: 29 PG (ref 27–31)
MCHC RBC AUTO-ENTMCNC: 30.8 G/DL (ref 32–36)
MCV RBC AUTO: 94 FL (ref 80–96)
MONOCYTES # BLD AUTO: 0.4 K/UL (ref 0–0.8)
MONOCYTES NFR BLD AUTO: 6.9 % (ref 2–6)
MPC BLD CALC-MCNC: 9.6 FL (ref 9.4–12.4)
NEUTROPHILS # BLD AUTO: 3.36 K/UL (ref 1.8–7.7)
NEUTROPHILS NFR BLD AUTO: 58.1 % (ref 53–65)
NRBC # BLD AUTO: 0 X10E3/UL
NRBC, AUTO (.00): 0 %
PLATELET # BLD AUTO: 207 K/UL (ref 150–400)
POTASSIUM SERPL-SCNC: 4 MMOL/L (ref 3.5–5.1)
PROT SERPL-MCNC: 7.1 G/DL (ref 6.4–8.2)
RBC # BLD AUTO: 3.97 M/UL (ref 4.2–5.4)
SODIUM SERPL-SCNC: 137 MMOL/L (ref 136–145)
T4 FREE SERPL-MCNC: 0.89 NG/DL (ref 0.76–1.46)
TSH SERPL DL<=0.005 MIU/L-ACNC: 22.3 UIU/ML (ref 0.36–3.74)
UA COMPLETE W REFLEX CULTURE PNL UR: ABNORMAL
WBC # BLD AUTO: 5.78 K/UL (ref 4.5–11)

## 2022-01-01 PROCEDURE — 84443 THYROID PANEL: ICD-10-PCS | Mod: ,,, | Performed by: CLINICAL MEDICAL LABORATORY

## 2022-01-01 PROCEDURE — 87186 SC STD MICRODIL/AGAR DIL: CPT | Mod: ,,, | Performed by: CLINICAL MEDICAL LABORATORY

## 2022-01-01 PROCEDURE — 85025 CBC WITH DIFFERENTIAL: ICD-10-PCS | Mod: ,,, | Performed by: CLINICAL MEDICAL LABORATORY

## 2022-01-01 PROCEDURE — 87086 CULTURE, URINE: ICD-10-PCS | Mod: ,,, | Performed by: CLINICAL MEDICAL LABORATORY

## 2022-01-01 PROCEDURE — 87086 URINE CULTURE/COLONY COUNT: CPT | Mod: ,,, | Performed by: CLINICAL MEDICAL LABORATORY

## 2022-01-01 PROCEDURE — 80053 COMPREHEN METABOLIC PANEL: CPT | Mod: ,,, | Performed by: CLINICAL MEDICAL LABORATORY

## 2022-01-01 PROCEDURE — 84439 THYROID PANEL: ICD-10-PCS | Mod: ,,, | Performed by: CLINICAL MEDICAL LABORATORY

## 2022-01-01 PROCEDURE — 99214 PR OFFICE/OUTPT VISIT, EST, LEVL IV, 30-39 MIN: ICD-10-PCS | Mod: ,,, | Performed by: FAMILY MEDICINE

## 2022-01-01 PROCEDURE — 85025 COMPLETE CBC W/AUTO DIFF WBC: CPT | Mod: ,,, | Performed by: CLINICAL MEDICAL LABORATORY

## 2022-01-01 PROCEDURE — 80053 COMPREHENSIVE METABOLIC PANEL: ICD-10-PCS | Mod: ,,, | Performed by: CLINICAL MEDICAL LABORATORY

## 2022-01-01 PROCEDURE — 84443 ASSAY THYROID STIM HORMONE: CPT | Mod: ,,, | Performed by: CLINICAL MEDICAL LABORATORY

## 2022-01-01 PROCEDURE — 90694 FLU VACCINE - QUADRIVALENT - ADJUVANTED: ICD-10-PCS | Mod: ,,, | Performed by: FAMILY MEDICINE

## 2022-01-01 PROCEDURE — 87077 CULTURE AEROBIC IDENTIFY: CPT | Mod: ,,, | Performed by: CLINICAL MEDICAL LABORATORY

## 2022-01-01 PROCEDURE — 90694 VACC AIIV4 NO PRSRV 0.5ML IM: CPT | Mod: ,,, | Performed by: FAMILY MEDICINE

## 2022-01-01 PROCEDURE — G0008 ADMIN INFLUENZA VIRUS VAC: HCPCS | Mod: ,,, | Performed by: FAMILY MEDICINE

## 2022-01-01 PROCEDURE — 87186 CULTURE, URINE: ICD-10-PCS | Mod: ,,, | Performed by: CLINICAL MEDICAL LABORATORY

## 2022-01-01 PROCEDURE — 99214 OFFICE O/P EST MOD 30 MIN: CPT | Mod: ,,, | Performed by: FAMILY MEDICINE

## 2022-01-01 PROCEDURE — G0008 FLU VACCINE - QUADRIVALENT - ADJUVANTED: ICD-10-PCS | Mod: ,,, | Performed by: FAMILY MEDICINE

## 2022-01-01 PROCEDURE — 84439 ASSAY OF FREE THYROXINE: CPT | Mod: ,,, | Performed by: CLINICAL MEDICAL LABORATORY

## 2022-01-01 PROCEDURE — 87077 CULTURE, URINE: ICD-10-PCS | Mod: ,,, | Performed by: CLINICAL MEDICAL LABORATORY

## 2022-01-01 RX ORDER — LEVOTHYROXINE SODIUM 100 UG/1
TABLET ORAL
Qty: 90 TABLET | Refills: 3 | Status: ON HOLD | OUTPATIENT
Start: 2022-01-01 | End: 2023-01-01 | Stop reason: HOSPADM

## 2022-01-01 RX ORDER — EZETIMIBE AND SIMVASTATIN 10; 20 MG/1; MG/1
TABLET ORAL
Qty: 90 TABLET | Refills: 3 | Status: ON HOLD | OUTPATIENT
Start: 2022-01-01 | End: 2023-01-01 | Stop reason: SDUPTHER

## 2022-01-01 RX ORDER — DONEPEZIL HYDROCHLORIDE 10 MG/1
10 TABLET, FILM COATED ORAL NIGHTLY
COMMUNITY
End: 2022-01-01 | Stop reason: SDUPTHER

## 2022-01-01 RX ORDER — DONEPEZIL HYDROCHLORIDE 10 MG/1
10 TABLET, FILM COATED ORAL NIGHTLY
Qty: 90 TABLET | Refills: 3 | Status: SHIPPED | OUTPATIENT
Start: 2022-01-01

## 2022-01-01 RX ORDER — AZITHROMYCIN 250 MG/1
250 TABLET, FILM COATED ORAL DAILY
Qty: 6 TABLET | Refills: 0 | Status: ON HOLD | OUTPATIENT
Start: 2022-01-01 | End: 2023-01-01 | Stop reason: HOSPADM

## 2022-01-01 RX ORDER — OLMESARTAN MEDOXOMIL 20 MG/1
20 TABLET ORAL DAILY
Qty: 90 TABLET | Refills: 3 | Status: ON HOLD | OUTPATIENT
Start: 2022-01-01 | End: 2023-01-01 | Stop reason: SDUPTHER

## 2022-01-01 RX ORDER — FUROSEMIDE 40 MG/1
40 TABLET ORAL DAILY
Qty: 90 TABLET | Refills: 3 | Status: SHIPPED | OUTPATIENT
Start: 2022-01-01 | End: 2022-01-01 | Stop reason: SDUPTHER

## 2022-01-01 RX ORDER — POTASSIUM CHLORIDE 750 MG/1
10 TABLET, EXTENDED RELEASE ORAL DAILY
Qty: 90 TABLET | Refills: 3 | Status: ON HOLD | OUTPATIENT
Start: 2022-01-01 | End: 2023-01-01 | Stop reason: HOSPADM

## 2022-01-01 RX ORDER — DIAZEPAM 2 MG/1
TABLET ORAL
Qty: 30 TABLET | Refills: 5 | Status: SHIPPED | OUTPATIENT
Start: 2022-01-01 | End: 2022-01-01 | Stop reason: SDUPTHER

## 2022-01-01 RX ORDER — MEMANTINE HYDROCHLORIDE 28 MG/1
28 CAPSULE, EXTENDED RELEASE ORAL DAILY
Qty: 30 CAPSULE | Refills: 11 | Status: SHIPPED | OUTPATIENT
Start: 2022-01-01 | End: 2022-01-01 | Stop reason: SDUPTHER

## 2022-01-01 RX ORDER — ISOSORBIDE MONONITRATE 60 MG/1
60 TABLET, EXTENDED RELEASE ORAL DAILY
Qty: 90 TABLET | Refills: 3 | Status: SHIPPED | OUTPATIENT
Start: 2022-01-01 | End: 2022-01-01 | Stop reason: SDUPTHER

## 2022-01-01 RX ORDER — POTASSIUM CHLORIDE 750 MG/1
10 TABLET, EXTENDED RELEASE ORAL DAILY
Qty: 90 TABLET | Refills: 3 | Status: SHIPPED | OUTPATIENT
Start: 2022-01-01 | End: 2022-01-01 | Stop reason: SDUPTHER

## 2022-01-01 RX ORDER — DIAZEPAM 2 MG/1
TABLET ORAL
Qty: 90 TABLET | Refills: 1 | Status: ON HOLD | OUTPATIENT
Start: 2022-01-01 | End: 2023-01-01 | Stop reason: HOSPADM

## 2022-01-01 RX ORDER — EZETIMIBE AND SIMVASTATIN 10; 20 MG/1; MG/1
TABLET ORAL
Qty: 90 TABLET | Refills: 3 | Status: SHIPPED | OUTPATIENT
Start: 2022-01-01 | End: 2022-01-01 | Stop reason: SDUPTHER

## 2022-01-01 RX ORDER — DILTIAZEM HYDROCHLORIDE 180 MG/1
180 CAPSULE, COATED, EXTENDED RELEASE ORAL DAILY
Qty: 90 CAPSULE | Refills: 3 | Status: ON HOLD | OUTPATIENT
Start: 2022-01-01 | End: 2023-01-01 | Stop reason: SDUPTHER

## 2022-01-01 RX ORDER — NEBIVOLOL 5 MG/1
5 TABLET ORAL EVERY MORNING
COMMUNITY
Start: 2022-01-01 | End: 2022-01-01 | Stop reason: SDUPTHER

## 2022-01-01 RX ORDER — SERTRALINE HYDROCHLORIDE 50 MG/1
50 TABLET, FILM COATED ORAL DAILY
Qty: 90 TABLET | Refills: 3 | Status: SHIPPED | OUTPATIENT
Start: 2022-01-01

## 2022-01-01 RX ORDER — DONEPEZIL HYDROCHLORIDE 10 MG/1
10 TABLET, FILM COATED ORAL NIGHTLY
Qty: 30 TABLET | Refills: 11 | Status: SHIPPED | OUTPATIENT
Start: 2022-01-01 | End: 2022-01-01 | Stop reason: SDUPTHER

## 2022-01-01 RX ORDER — MEMANTINE HYDROCHLORIDE AND DONEPEZIL HYDROCHLORIDE 28; 10 MG/1; MG/1
1 CAPSULE ORAL NIGHTLY
Qty: 90 EACH | Refills: 3 | Status: ON HOLD | OUTPATIENT
Start: 2022-01-01 | End: 2023-01-01 | Stop reason: SDUPTHER

## 2022-01-01 RX ORDER — SERTRALINE HYDROCHLORIDE 50 MG/1
50 TABLET, FILM COATED ORAL DAILY
Qty: 60 TABLET | Refills: 3 | Status: SHIPPED | OUTPATIENT
Start: 2022-01-01 | End: 2022-01-01 | Stop reason: SDUPTHER

## 2022-01-01 RX ORDER — MEMANTINE HYDROCHLORIDE 28 MG/1
28 CAPSULE, EXTENDED RELEASE ORAL DAILY
Qty: 90 CAPSULE | Refills: 3 | Status: SHIPPED | OUTPATIENT
Start: 2022-01-01

## 2022-01-01 RX ORDER — APIXABAN 2.5 MG/1
2.5 TABLET, FILM COATED ORAL 2 TIMES DAILY
Qty: 180 TABLET | Refills: 3 | Status: SHIPPED | OUTPATIENT
Start: 2022-01-01

## 2022-01-01 RX ORDER — PANTOPRAZOLE SODIUM 40 MG/1
TABLET, DELAYED RELEASE ORAL
Qty: 90 TABLET | Refills: 3 | Status: SHIPPED | OUTPATIENT
Start: 2022-01-01 | End: 2022-01-01 | Stop reason: SDUPTHER

## 2022-01-01 RX ORDER — LEVOTHYROXINE SODIUM 100 UG/1
TABLET ORAL
Qty: 90 TABLET | Refills: 3 | Status: SHIPPED | OUTPATIENT
Start: 2022-01-01 | End: 2022-01-01 | Stop reason: SDUPTHER

## 2022-01-01 RX ORDER — LEVOTHYROXINE SODIUM 112 UG/1
112 TABLET ORAL
Qty: 30 TABLET | Refills: 11 | Status: SHIPPED | OUTPATIENT
Start: 2022-01-01 | End: 2023-10-18

## 2022-01-01 RX ORDER — APIXABAN 2.5 MG/1
2.5 TABLET, FILM COATED ORAL 2 TIMES DAILY
Qty: 60 TABLET | Refills: 11 | Status: SHIPPED | OUTPATIENT
Start: 2022-01-01 | End: 2022-01-01 | Stop reason: SDUPTHER

## 2022-01-01 RX ORDER — PANTOPRAZOLE SODIUM 40 MG/1
TABLET, DELAYED RELEASE ORAL
Qty: 90 TABLET | Refills: 3 | Status: ON HOLD | OUTPATIENT
Start: 2022-01-01 | End: 2023-01-01 | Stop reason: SDUPTHER

## 2022-01-01 RX ORDER — MEMANTINE HYDROCHLORIDE 28 MG/1
CAPSULE, EXTENDED RELEASE ORAL
COMMUNITY
End: 2022-01-01 | Stop reason: SDUPTHER

## 2022-01-01 RX ORDER — ISOSORBIDE MONONITRATE 60 MG/1
60 TABLET, EXTENDED RELEASE ORAL DAILY
Qty: 90 TABLET | Refills: 3 | Status: ON HOLD | OUTPATIENT
Start: 2022-01-01 | End: 2023-01-01 | Stop reason: SDUPTHER

## 2022-01-01 RX ORDER — FUROSEMIDE 40 MG/1
40 TABLET ORAL DAILY
Qty: 90 TABLET | Refills: 3 | Status: ON HOLD | OUTPATIENT
Start: 2022-01-01 | End: 2023-01-01 | Stop reason: SDUPTHER

## 2022-01-01 RX ORDER — CEPHALEXIN 500 MG/1
500 CAPSULE ORAL EVERY 12 HOURS
Qty: 10 CAPSULE | Refills: 0 | Status: ON HOLD | OUTPATIENT
Start: 2022-01-01 | End: 2023-01-01 | Stop reason: HOSPADM

## 2022-01-01 RX ORDER — SERTRALINE HYDROCHLORIDE 50 MG/1
50 TABLET, FILM COATED ORAL DAILY
COMMUNITY
Start: 2022-01-15 | End: 2022-01-01 | Stop reason: SDUPTHER

## 2022-01-01 RX ORDER — OLMESARTAN MEDOXOMIL 20 MG/1
20 TABLET ORAL DAILY
Qty: 90 TABLET | Refills: 3 | Status: SHIPPED | OUTPATIENT
Start: 2022-01-01 | End: 2022-01-01 | Stop reason: SDUPTHER

## 2022-01-01 RX ORDER — NEBIVOLOL 5 MG/1
5 TABLET ORAL EVERY MORNING
Qty: 90 TABLET | Refills: 3 | Status: ON HOLD | OUTPATIENT
Start: 2022-01-01 | End: 2023-01-01 | Stop reason: SDUPTHER

## 2022-01-01 RX ORDER — NEBIVOLOL 5 MG/1
5 TABLET ORAL EVERY MORNING
Qty: 90 TABLET | Refills: 3 | Status: SHIPPED | OUTPATIENT
Start: 2022-01-01 | End: 2022-01-01 | Stop reason: SDUPTHER

## 2022-01-01 RX ORDER — POTASSIUM CHLORIDE 750 MG/1
10 TABLET, EXTENDED RELEASE ORAL DAILY
COMMUNITY
Start: 2022-01-15 | End: 2022-01-01 | Stop reason: SDUPTHER

## 2022-01-17 RX ORDER — DIAZEPAM 2 MG/1
TABLET ORAL
Qty: 30 TABLET | Refills: 1 | Status: SHIPPED | OUTPATIENT
Start: 2022-01-17 | End: 2022-01-01 | Stop reason: SDUPTHER

## 2022-03-16 NOTE — TELEPHONE ENCOUNTER
----- Message from Monica Mcneil sent at 3/16/2022  2:23 PM CDT -----  Regarding: Med Refill  Contact: Daughter-Stephanie Ramos  Patient daughter called and stated her mom needs a refill on her Diazepam and Bystolic called into [facility] her  10/18/25. She left a message and then she called me.

## 2022-05-13 NOTE — TELEPHONE ENCOUNTER
Patient requesting to change the namzaric 28-10mg. What do you want her to replace this with? Would it be Aircept and Namenda?    Also refills on imdur and ezetimibe-simvastatin

## 2022-10-14 NOTE — PROGRESS NOTES
Andry Galarza MD        PATIENT NAME: Kymberly Franco  : 10/18/1925  DATE: 10/14/22  MRN: [id number]      Billing Provider: Andry Galarza MD  Level of Service: NV OFFICE/OUTPT VISIT, EST, LEVL IV, 30-39 MIN  Patient PCP Information       Provider PCP Type    Andry Galarza MD General            Reason for Visit / Chief Complaint: Annual Exam (Patient's daughter brought urine. Placed urine in urine cup and gave to lab)       Update PCP  Update Chief Complaint         History of Present Illness / Problem Focused Workflow     Kymberly Franco presents to the clinic with Annual Exam (Patient's daughter brought urine. Placed urine in urine cup and gave to lab)     Brought by daughter.  Routine followup.      Review of Systems     Review of Systems   Constitutional:  Positive for fatigue. Negative for activity change, appetite change, fever and unexpected weight change.   HENT:  Negative for congestion, rhinorrhea, sinus pressure, sinus pain, sore throat and trouble swallowing.    Eyes:  Negative for photophobia, pain, discharge and visual disturbance.   Respiratory:  Negative for cough, chest tightness, wheezing and stridor.    Cardiovascular:  Negative for chest pain, palpitations and leg swelling.   Gastrointestinal:  Negative for abdominal pain, blood in stool, constipation, diarrhea and nausea.   Endocrine: Negative for polydipsia, polyphagia and polyuria.   Genitourinary:  Negative for difficulty urinating, flank pain and hematuria.   Musculoskeletal:  Positive for gait problem. Negative for arthralgias and neck pain.   Skin:  Negative for rash.   Allergic/Immunologic: Negative for food allergies.   Neurological:  Negative for dizziness, tremors, seizures, syncope, weakness (global weakness) and headaches.   Psychiatric/Behavioral:  Negative for behavioral problems, confusion, decreased concentration, dysphoric mood and hallucinations. The patient is not nervous/anxious.       Medical / Social / Family History    History reviewed. No pertinent past medical history.    History reviewed. No pertinent surgical history.    Social History  Ms.      Family History  Ms.'s family history is not on file.    Medications and Allergies     Medications  No outpatient medications have been marked as taking for the 10/14/22 encounter (Office Visit) with Andry Galarza MD.       Allergies  Review of patient's allergies indicates:   Allergen Reactions    Cipro [ciprofloxacin hcl]     Pcn [penicillins] Hives and Itching       Physical Examination     Vitals:    10/14/22 1335   BP: 90/60   Pulse: 66   Resp: 18     Physical Exam  Constitutional:       General: She is not in acute distress.     Appearance: Normal appearance.   HENT:      Head: Normocephalic.      Right Ear: Tympanic membrane and ear canal normal.      Left Ear: Tympanic membrane and ear canal normal.      Nose: Nose normal.      Mouth/Throat:      Mouth: Mucous membranes are moist.      Pharynx: No oropharyngeal exudate.   Eyes:      Extraocular Movements: Extraocular movements intact.      Pupils: Pupils are equal, round, and reactive to light.   Cardiovascular:      Rate and Rhythm: Normal rate and regular rhythm.      Heart sounds: No murmur heard.  Pulmonary:      Effort: Pulmonary effort is normal.      Breath sounds: Normal breath sounds. No wheezing.   Abdominal:      General: Abdomen is flat. Bowel sounds are normal.      Palpations: Abdomen is soft.      Hernia: No hernia is present.   Musculoskeletal:      Cervical back: Normal range of motion and neck supple.      Right lower leg: No edema.      Left lower leg: No edema.      Comments: Seated in a wheelchair   Lymphadenopathy:      Cervical: No cervical adenopathy.   Skin:     General: Skin is warm and dry.      Coloration: Skin is not jaundiced.      Findings: No lesion.   Neurological:      General: No focal deficit present.      Mental Status: She is alert and oriented to person, place, and time.      Cranial  Nerves: No cranial nerve deficit.      Gait: Gait normal.   Psychiatric:         Mood and Affect: Mood normal.         Behavior: Behavior normal.         Judgment: Judgment normal.        Assessment and Plan (including Health Maintenance)      Problem List  Smart Sets  Document Outside HM   :    Plan:   Essential hypertension  -     nebivoloL (BYSTOLIC) 5 MG Tab; Take 1 tablet (5 mg total) by mouth every morning.  Dispense: 90 tablet; Refill: 3  -     CBC Auto Differential; Future; Expected date: 04/14/2023  -     Comprehensive Metabolic Panel; Future; Expected date: 04/14/2023    Routine medical exam  -     Cancel: Urinalysis, Reflex to Urine Culture    Generalized anxiety disorder  -     diazePAM (VALIUM) 2 MG tablet; TAKE 1 TABLET BY MOUTH EACH NIGHT AT BEDTIME ..MAY CAUSE DROWSINESS.. NO ALCOHOL  Dispense: 90 tablet; Refill: 1    Atrial fibrillation, unspecified type  -     ELIQUIS 2.5 mg Tab; Take 1 tablet (2.5 mg total) by mouth 2 (two) times daily.  Dispense: 180 tablet; Refill: 3  -     furosemide (LASIX) 40 MG tablet; Take 1 tablet (40 mg total) by mouth once daily.  Dispense: 90 tablet; Refill: 3  -     isosorbide mononitrate (IMDUR) 60 MG 24 hr tablet; Take 1 tablet (60 mg total) by mouth once daily.  Dispense: 90 tablet; Refill: 3    Hyperlipidemia, unspecified hyperlipidemia type  -     ezetimibe-simvastatin 10-20 mg (VYTORIN) 10-20 mg per tablet; TAKE 1 TABLET BY MOUTH DAILY ..DO NOT EAT GRAPEFRUIT OR DRINK GRAPEFRUIT JUICE WHILE TAKING..  Dispense: 90 tablet; Refill: 3    Hypertension, unspecified type  -     olmesartan (BENICAR) 20 MG tablet; Take 1 tablet (20 mg total) by mouth once daily.  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  Dispense: 90 tablet; Refill: 3    Hypothyroidism, unspecified type  -     Thyroid Panel; Future; Expected date: 10/14/2022    Need for influenza vaccination  -     Influenza - Quadrivalent (Adjuvanted)    Dysuria  -     Urine culture    Other orders  -     diltiaZEM (CARDIZEM CD) 180 MG 24 hr capsule; Take 1 capsule (180 mg total) by mouth once daily.  Dispense: 90 capsule; Refill: 3  -     donepeziL (ARICEPT) 10 MG tablet; Take 1 tablet (10 mg total) by mouth every evening.  Dispense: 90 tablet; Refill: 3  -     levothyroxine (SYNTHROID) 100 MCG tablet; TAKE 1 TABLET BY MOUTH DAILY ON EMPTY STOMACH  Dispense: 90 tablet; Refill: 3  -     memantine (NAMENDA XR) 28 mg CSpX; Take 1 capsule (28 mg total) by mouth once daily.  Dispense: 90 capsule; Refill: 3  -     NAMZARIC 28-10 mg CSpX; Take 1 capsule by mouth every evening.  Dispense: 90 each; Refill: 3  -     potassium chloride SA (K-DUR,KLOR-CON M) 10 MEQ tablet; Take 1 tablet (10 mEq total) by mouth once daily.  Dispense: 90 tablet; Refill: 3         Health Maintenance Due   Topic Date Due    Lipid Panel  Never done    COVID-19 Vaccine (1) Never done    TETANUS VACCINE  Never done    Shingles Vaccine (1 of 2) Never done    Pneumococcal Vaccines (Age 65+) (1 - PCV) Never done    Influenza Vaccine (1) 09/01/2022       Problem List Items Addressed This Visit    None  Visit Diagnoses       Essential hypertension    -  Primary    Relevant Medications    nebivoloL (BYSTOLIC) 5 MG Tab    Other Relevant Orders    CBC Auto Differential (Completed)    Comprehensive Metabolic Panel (Completed)    Routine medical exam        Generalized anxiety disorder        Relevant Medications    diazePAM (VALIUM) 2 MG tablet    Atrial fibrillation, unspecified type        Relevant Medications    ELIQUIS 2.5 mg Tab    furosemide (LASIX) 40 MG tablet    isosorbide mononitrate (IMDUR) 60 MG 24 hr tablet    Hyperlipidemia, unspecified hyperlipidemia  type        Relevant Medications    ezetimibe-simvastatin 10-20 mg (VYTORIN) 10-20 mg per tablet    Hypertension, unspecified type        Relevant Medications    olmesartan (BENICAR) 20 MG tablet    Gastroesophageal reflux disease, unspecified whether esophagitis present        Relevant Medications    pantoprazole (PROTONIX) 40 MG tablet    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     There are no Patient Instructions on file for this visit.  Follow up if symptoms worsen or fail to improve.     Signature:  Andry Galarza MD      Date of encounter: 10/14/22

## 2022-10-19 NOTE — TELEPHONE ENCOUNTER
Phone follow-up with Stephanie,  Mrs Borrero's daughter.  I informed her that she does have urinary tract infection sensitive to all antibiotics.  However she now has no symptoms.  Cautioned Adelfo that if she develops symptoms to give me a call and we would start Bactrim double strength twice daily for 3 days.

## 2022-11-29 NOTE — TELEPHONE ENCOUNTER
----- Message from Enriqueta Oh sent at 11/29/2022  2:12 PM CST -----  PT IS HAVING SYMPTOMS OF UTI AND DR. DEMPSEY TOLD THEM TO CALL BACK IF SHE WAS HAVING SYMPTOMS TO GET SOME MEDICATION.  [facility].

## 2023-01-01 ENCOUNTER — HOSPITAL ENCOUNTER (INPATIENT)
Facility: HOSPITAL | Age: 88
LOS: 11 days | Discharge: NURSING, REHAB OR LONG-TERM CARE | DRG: 480 | End: 2023-02-06
Attending: EMERGENCY MEDICINE | Admitting: STUDENT IN AN ORGANIZED HEALTH CARE EDUCATION/TRAINING PROGRAM
Payer: MEDICARE

## 2023-01-01 ENCOUNTER — HOSPITAL ENCOUNTER (INPATIENT)
Facility: HOSPITAL | Age: 88
LOS: 8 days | DRG: 871 | End: 2023-02-14
Attending: INTERNAL MEDICINE | Admitting: INTERNAL MEDICINE
Payer: MEDICARE

## 2023-01-01 ENCOUNTER — ANESTHESIA (OUTPATIENT)
Dept: SURGERY | Facility: HOSPITAL | Age: 88
DRG: 480 | End: 2023-01-01
Payer: MEDICARE

## 2023-01-01 ENCOUNTER — ANESTHESIA EVENT (OUTPATIENT)
Dept: SURGERY | Facility: HOSPITAL | Age: 88
DRG: 480 | End: 2023-01-01
Payer: MEDICARE

## 2023-01-01 VITALS
DIASTOLIC BLOOD PRESSURE: 78 MMHG | BODY MASS INDEX: 21.17 KG/M2 | WEIGHT: 115.06 LBS | OXYGEN SATURATION: 95 % | TEMPERATURE: 99 F | SYSTOLIC BLOOD PRESSURE: 138 MMHG | RESPIRATION RATE: 28 BRPM | HEART RATE: 138 BPM | HEIGHT: 62 IN

## 2023-01-01 VITALS
RESPIRATION RATE: 12 BRPM | DIASTOLIC BLOOD PRESSURE: 38 MMHG | SYSTOLIC BLOOD PRESSURE: 72 MMHG | HEIGHT: 62 IN | OXYGEN SATURATION: 88 % | BODY MASS INDEX: 27.83 KG/M2 | HEART RATE: 120 BPM | TEMPERATURE: 100 F | WEIGHT: 151.25 LBS

## 2023-01-01 DIAGNOSIS — Z51.5 HOSPICE CARE: ICD-10-CM

## 2023-01-01 DIAGNOSIS — I48.0 PAROXYSMAL ATRIAL FIBRILLATION: ICD-10-CM

## 2023-01-01 DIAGNOSIS — S72.142A CLOSED DISPLACED INTERTROCHANTERIC FRACTURE OF LEFT FEMUR, INITIAL ENCOUNTER: ICD-10-CM

## 2023-01-01 DIAGNOSIS — K21.9 GASTROESOPHAGEAL REFLUX DISEASE, UNSPECIFIED WHETHER ESOPHAGITIS PRESENT: ICD-10-CM

## 2023-01-01 DIAGNOSIS — I48.91 ATRIAL FIBRILLATION, UNSPECIFIED TYPE: ICD-10-CM

## 2023-01-01 DIAGNOSIS — S72.143A INTERTROCHANTERIC FRACTURE OF FEMUR: ICD-10-CM
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
## 2023-01-01 DIAGNOSIS — I10 HYPERTENSION, UNSPECIFIED TYPE: ICD-10-CM

## 2023-01-01 DIAGNOSIS — G93.41 ENCEPHALOPATHY, METABOLIC: ICD-10-CM

## 2023-01-01 DIAGNOSIS — I48.91 ATRIAL FIBRILLATION WITH TACHYCARDIC VENTRICULAR RATE: ICD-10-CM

## 2023-01-01 DIAGNOSIS — E78.5 HYPERLIPIDEMIA, UNSPECIFIED HYPERLIPIDEMIA TYPE: ICD-10-CM

## 2023-01-01 DIAGNOSIS — G30.1 MODERATE LATE ONSET ALZHEIMER'S DEMENTIA WITH ANXIETY: ICD-10-CM

## 2023-01-01 DIAGNOSIS — E07.9 THYROID DISEASE: ICD-10-CM

## 2023-01-01 DIAGNOSIS — Z01.811 PRE-OP CHEST EXAM: ICD-10-CM

## 2023-01-01 DIAGNOSIS — I63.9 CVA (CEREBRAL VASCULAR ACCIDENT): Primary | ICD-10-CM

## 2023-01-01 DIAGNOSIS — E78.2 MIXED HYPERLIPIDEMIA: ICD-10-CM

## 2023-01-01 DIAGNOSIS — F02.B4 MODERATE LATE ONSET ALZHEIMER'S DEMENTIA WITH ANXIETY: ICD-10-CM
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
## 2023-01-01 DIAGNOSIS — I10 PRIMARY HYPERTENSION: Primary | ICD-10-CM

## 2023-01-01 DIAGNOSIS — I63.9 CEREBROVASCULAR ACCIDENT (CVA), UNSPECIFIED MECHANISM: ICD-10-CM

## 2023-01-01 DIAGNOSIS — I10 ESSENTIAL HYPERTENSION: ICD-10-CM

## 2023-01-01 LAB
ALBUMIN SERPL BCP-MCNC: 1.9 G/DL (ref 3.5–5)
ALBUMIN SERPL BCP-MCNC: 1.9 G/DL (ref 3.5–5)
ALBUMIN SERPL BCP-MCNC: 2.1 G/DL (ref 3.5–5)
ALBUMIN SERPL BCP-MCNC: 2.4 G/DL (ref 3.5–5)
ALBUMIN SERPL BCP-MCNC: 3.6 G/DL (ref 3.5–5)
ALBUMIN/GLOB SERPL: 0.6 {RATIO}
ALBUMIN/GLOB SERPL: 0.9 {RATIO}
ALBUMIN/GLOB SERPL: 0.9 {RATIO}
ALP SERPL-CCNC: 43 U/L (ref 55–142)
ALP SERPL-CCNC: 55 U/L (ref 55–142)
ALP SERPL-CCNC: 72 U/L (ref 55–142)
ALP SERPL-CCNC: 86 U/L (ref 55–142)
ALP SERPL-CCNC: 90 U/L (ref 55–142)
ALT SERPL W P-5'-P-CCNC: 12 U/L (ref 13–56)
ALT SERPL W P-5'-P-CCNC: 13 U/L (ref 13–56)
ALT SERPL W P-5'-P-CCNC: 41 U/L (ref 13–56)
ALT SERPL W P-5'-P-CCNC: 41 U/L (ref 13–56)
ALT SERPL W P-5'-P-CCNC: 9 U/L (ref 13–56)
AMMONIA PLAS-SCNC: <10 ΜMOL/L (ref 11–32)
ANION GAP SERPL CALCULATED.3IONS-SCNC: 10 MMOL/L (ref 7–16)
ANION GAP SERPL CALCULATED.3IONS-SCNC: 11 MMOL/L (ref 7–16)
ANION GAP SERPL CALCULATED.3IONS-SCNC: 12 MMOL/L (ref 7–16)
ANION GAP SERPL CALCULATED.3IONS-SCNC: 13 MMOL/L (ref 7–16)
ANION GAP SERPL CALCULATED.3IONS-SCNC: 14 MMOL/L (ref 7–16)
ANION GAP SERPL CALCULATED.3IONS-SCNC: 15 MMOL/L (ref 7–16)
ANION GAP SERPL CALCULATED.3IONS-SCNC: 16 MMOL/L (ref 7–16)
APTT PPP: 30.7 SECONDS (ref 25.2–37.3)
AST SERPL W P-5'-P-CCNC: 16 U/L (ref 15–37)
AST SERPL W P-5'-P-CCNC: 16 U/L (ref 15–37)
AST SERPL W P-5'-P-CCNC: 39 U/L (ref 15–37)
AST SERPL W P-5'-P-CCNC: 49 U/L (ref 15–37)
AST SERPL W P-5'-P-CCNC: 55 U/L (ref 15–37)
BACTERIA #/AREA URNS HPF: ABNORMAL /HPF
BACTERIA #/AREA URNS HPF: ABNORMAL /HPF
BACTERIA BLD CULT: NORMAL
BACTERIA BLD CULT: NORMAL
BASOPHILS # BLD AUTO: 0.02 K/UL (ref 0–0.2)
BASOPHILS # BLD AUTO: 0.02 K/UL (ref 0–0.2)
BASOPHILS # BLD AUTO: 0.03 K/UL (ref 0–0.2)
BASOPHILS # BLD AUTO: 0.05 K/UL (ref 0–0.2)
BASOPHILS NFR BLD AUTO: 0.2 % (ref 0–1)
BASOPHILS NFR BLD AUTO: 0.2 % (ref 0–1)
BASOPHILS NFR BLD AUTO: 0.3 % (ref 0–1)
BASOPHILS NFR BLD AUTO: 0.4 % (ref 0–1)
BASOPHILS NFR BLD AUTO: 0.5 % (ref 0–1)
BASOPHILS NFR BLD AUTO: 0.7 % (ref 0–1)
BILIRUB SERPL-MCNC: 0.2 MG/DL (ref ?–1.2)
BILIRUB SERPL-MCNC: 0.3 MG/DL (ref ?–1.2)
BILIRUB SERPL-MCNC: 0.3 MG/DL (ref ?–1.2)
BILIRUB SERPL-MCNC: 0.4 MG/DL (ref ?–1.2)
BILIRUB SERPL-MCNC: 0.4 MG/DL (ref ?–1.2)
BILIRUB UR QL STRIP: NEGATIVE
BILIRUB UR QL STRIP: NEGATIVE
BUN SERPL-MCNC: 31 MG/DL (ref 7–18)
BUN SERPL-MCNC: 35 MG/DL (ref 7–18)
BUN SERPL-MCNC: 35 MG/DL (ref 7–18)
BUN SERPL-MCNC: 39 MG/DL (ref 7–18)
BUN SERPL-MCNC: 40 MG/DL (ref 7–18)
BUN SERPL-MCNC: 41 MG/DL (ref 7–18)
BUN SERPL-MCNC: 43 MG/DL (ref 7–18)
BUN SERPL-MCNC: 46 MG/DL (ref 7–18)
BUN SERPL-MCNC: 47 MG/DL (ref 7–18)
BUN/CREAT SERPL: 14 (ref 6–20)
BUN/CREAT SERPL: 17 (ref 6–20)
BUN/CREAT SERPL: 19 (ref 6–20)
BUN/CREAT SERPL: 21 (ref 6–20)
BUN/CREAT SERPL: 27 (ref 6–20)
BUN/CREAT SERPL: 28 (ref 6–20)
BUN/CREAT SERPL: 32 (ref 6–20)
BUN/CREAT SERPL: 34 (ref 6–20)
BUN/CREAT SERPL: 38 (ref 6–20)
BUN/CREAT SERPL: 41 (ref 6–20)
BUN/CREAT SERPL: 42 (ref 6–20)
BUN/CREAT SERPL: 42 (ref 6–20)
BUN/CREAT SERPL: 45 (ref 6–20)
CALCIUM SERPL-MCNC: 7.2 MG/DL (ref 8.5–10.1)
CALCIUM SERPL-MCNC: 7.4 MG/DL (ref 8.5–10.1)
CALCIUM SERPL-MCNC: 7.6 MG/DL (ref 8.5–10.1)
CALCIUM SERPL-MCNC: 7.8 MG/DL (ref 8.5–10.1)
CALCIUM SERPL-MCNC: 7.9 MG/DL (ref 8.5–10.1)
CALCIUM SERPL-MCNC: 8.1 MG/DL (ref 8.5–10.1)
CALCIUM SERPL-MCNC: 8.3 MG/DL (ref 8.5–10.1)
CALCIUM SERPL-MCNC: 8.3 MG/DL (ref 8.5–10.1)
CALCIUM SERPL-MCNC: 8.4 MG/DL (ref 8.5–10.1)
CALCIUM SERPL-MCNC: 8.5 MG/DL (ref 8.5–10.1)
CALCIUM SERPL-MCNC: 8.9 MG/DL (ref 8.5–10.1)
CHLORIDE SERPL-SCNC: 103 MMOL/L (ref 98–107)
CHLORIDE SERPL-SCNC: 104 MMOL/L (ref 98–107)
CHLORIDE SERPL-SCNC: 106 MMOL/L (ref 98–107)
CHLORIDE SERPL-SCNC: 109 MMOL/L (ref 98–107)
CHLORIDE SERPL-SCNC: 110 MMOL/L (ref 98–107)
CHLORIDE SERPL-SCNC: 113 MMOL/L (ref 98–107)
CHLORIDE SERPL-SCNC: 117 MMOL/L (ref 98–107)
CHLORIDE SERPL-SCNC: 118 MMOL/L (ref 98–107)
CHLORIDE SERPL-SCNC: 118 MMOL/L (ref 98–107)
CHLORIDE SERPL-SCNC: 119 MMOL/L (ref 98–107)
CHLORIDE SERPL-SCNC: 120 MMOL/L (ref 98–107)
CHLORIDE SERPL-SCNC: 121 MMOL/L (ref 98–107)
CHLORIDE SERPL-SCNC: 121 MMOL/L (ref 98–107)
CLARITY UR: ABNORMAL
CLARITY UR: ABNORMAL
CO2 SERPL-SCNC: 20 MMOL/L (ref 21–32)
CO2 SERPL-SCNC: 20 MMOL/L (ref 21–32)
CO2 SERPL-SCNC: 22 MMOL/L (ref 21–32)
CO2 SERPL-SCNC: 22 MMOL/L (ref 21–32)
CO2 SERPL-SCNC: 24 MMOL/L (ref 21–32)
CO2 SERPL-SCNC: 26 MMOL/L (ref 21–32)
CO2 SERPL-SCNC: 27 MMOL/L (ref 21–32)
CO2 SERPL-SCNC: 27 MMOL/L (ref 21–32)
CO2 SERPL-SCNC: 29 MMOL/L (ref 21–32)
CO2 SERPL-SCNC: 31 MMOL/L (ref 21–32)
CO2 SERPL-SCNC: 31 MMOL/L (ref 21–32)
COLOR UR: ABNORMAL
COLOR UR: YELLOW
CREAT SERPL-MCNC: 0.93 MG/DL (ref 0.55–1.02)
CREAT SERPL-MCNC: 0.97 MG/DL (ref 0.55–1.02)
CREAT SERPL-MCNC: 1.04 MG/DL (ref 0.55–1.02)
CREAT SERPL-MCNC: 1.07 MG/DL (ref 0.55–1.02)
CREAT SERPL-MCNC: 1.11 MG/DL (ref 0.55–1.02)
CREAT SERPL-MCNC: 1.21 MG/DL (ref 0.55–1.02)
CREAT SERPL-MCNC: 1.27 MG/DL (ref 0.55–1.02)
CREAT SERPL-MCNC: 1.45 MG/DL (ref 0.55–1.02)
CREAT SERPL-MCNC: 1.48 MG/DL (ref 0.55–1.02)
CREAT SERPL-MCNC: 1.5 MG/DL (ref 0.55–1.02)
CREAT SERPL-MCNC: 1.87 MG/DL (ref 0.55–1.02)
CREAT SERPL-MCNC: 2.49 MG/DL (ref 0.55–1.02)
CREAT SERPL-MCNC: 2.56 MG/DL (ref 0.55–1.02)
D DIMER PPP FEU-MCNC: 8.01 ΜG/ML (ref 0–0.47)
DIFFERENTIAL METHOD BLD: ABNORMAL
EGFR (NO RACE VARIABLE) (RUSH/TITUS): 17 ML/MIN/1.73M²
EGFR (NO RACE VARIABLE) (RUSH/TITUS): 17 ML/MIN/1.73M²
EGFR (NO RACE VARIABLE) (RUSH/TITUS): 24 ML/MIN/1.73M²
EGFR (NO RACE VARIABLE) (RUSH/TITUS): 32 ML/MIN/1.73M²
EGFR (NO RACE VARIABLE) (RUSH/TITUS): 32 ML/MIN/1.73M²
EGFR (NO RACE VARIABLE) (RUSH/TITUS): 33 ML/MIN/1.73M²
EGFR (NO RACE VARIABLE) (RUSH/TITUS): 39 ML/MIN/1.73M²
EGFR (NO RACE VARIABLE) (RUSH/TITUS): 41 ML/MIN/1.73M²
EGFR (NO RACE VARIABLE) (RUSH/TITUS): 45 ML/MIN/1.73M²
EGFR (NO RACE VARIABLE) (RUSH/TITUS): 47 ML/MIN/1.73M²
EGFR (NO RACE VARIABLE) (RUSH/TITUS): 49 ML/MIN/1.73M²
EGFR (NO RACE VARIABLE) (RUSH/TITUS): 53 ML/MIN/1.73M²
EGFR (NO RACE VARIABLE) (RUSH/TITUS): 56 ML/MIN/1.73M²
EOSINOPHIL # BLD AUTO: 0 K/UL (ref 0–0.5)
EOSINOPHIL # BLD AUTO: 0.01 K/UL (ref 0–0.5)
EOSINOPHIL # BLD AUTO: 0.01 K/UL (ref 0–0.5)
EOSINOPHIL # BLD AUTO: 0.37 K/UL (ref 0–0.5)
EOSINOPHIL # BLD AUTO: 0.51 K/UL (ref 0–0.5)
EOSINOPHIL # BLD AUTO: 0.7 K/UL (ref 0–0.5)
EOSINOPHIL NFR BLD AUTO: 0 % (ref 1–4)
EOSINOPHIL NFR BLD AUTO: 0.1 % (ref 1–4)
EOSINOPHIL NFR BLD AUTO: 0.1 % (ref 1–4)
EOSINOPHIL NFR BLD AUTO: 4.8 % (ref 1–4)
EOSINOPHIL NFR BLD AUTO: 5.3 % (ref 1–4)
EOSINOPHIL NFR BLD AUTO: 5.6 % (ref 1–4)
ERYTHROCYTE [DISTWIDTH] IN BLOOD BY AUTOMATED COUNT: 13.2 % (ref 11.5–14.5)
ERYTHROCYTE [DISTWIDTH] IN BLOOD BY AUTOMATED COUNT: 13.5 % (ref 11.5–14.5)
ERYTHROCYTE [DISTWIDTH] IN BLOOD BY AUTOMATED COUNT: 13.8 % (ref 11.5–14.5)
ERYTHROCYTE [DISTWIDTH] IN BLOOD BY AUTOMATED COUNT: 14.3 % (ref 11.5–14.5)
ERYTHROCYTE [DISTWIDTH] IN BLOOD BY AUTOMATED COUNT: 16.4 % (ref 11.5–14.5)
ERYTHROCYTE [DISTWIDTH] IN BLOOD BY AUTOMATED COUNT: 16.5 % (ref 11.5–14.5)
FLUAV AG UPPER RESP QL IA.RAPID: NEGATIVE
FLUBV AG UPPER RESP QL IA.RAPID: NEGATIVE
GLOBULIN SER-MCNC: 2.8 G/DL (ref 2–4)
GLOBULIN SER-MCNC: 3.3 G/DL (ref 2–4)
GLOBULIN SER-MCNC: 3.4 G/DL (ref 2–4)
GLOBULIN SER-MCNC: 3.4 G/DL (ref 2–4)
GLOBULIN SER-MCNC: 4 G/DL (ref 2–4)
GLUCOSE SERPL-MCNC: 105 MG/DL (ref 74–106)
GLUCOSE SERPL-MCNC: 108 MG/DL (ref 74–106)
GLUCOSE SERPL-MCNC: 112 MG/DL (ref 70–105)
GLUCOSE SERPL-MCNC: 115 MG/DL (ref 74–106)
GLUCOSE SERPL-MCNC: 123 MG/DL (ref 74–106)
GLUCOSE SERPL-MCNC: 131 MG/DL (ref 74–106)
GLUCOSE SERPL-MCNC: 136 MG/DL (ref 74–106)
GLUCOSE SERPL-MCNC: 137 MG/DL (ref 74–106)
GLUCOSE SERPL-MCNC: 138 MG/DL (ref 70–105)
GLUCOSE SERPL-MCNC: 140 MG/DL (ref 74–106)
GLUCOSE SERPL-MCNC: 141 MG/DL (ref 70–105)
GLUCOSE SERPL-MCNC: 144 MG/DL (ref 70–105)
GLUCOSE SERPL-MCNC: 151 MG/DL (ref 70–105)
GLUCOSE SERPL-MCNC: 153 MG/DL (ref 74–106)
GLUCOSE SERPL-MCNC: 153 MG/DL (ref 74–106)
GLUCOSE SERPL-MCNC: 155 MG/DL (ref 74–106)
GLUCOSE SERPL-MCNC: 156 MG/DL (ref 70–105)
GLUCOSE SERPL-MCNC: 156 MG/DL (ref 70–105)
GLUCOSE SERPL-MCNC: 161 MG/DL (ref 70–105)
GLUCOSE SERPL-MCNC: 165 MG/DL (ref 74–106)
GLUCOSE SERPL-MCNC: 180 MG/DL (ref 74–106)
GLUCOSE UR STRIP-MCNC: NORMAL MG/DL
GLUCOSE UR STRIP-MCNC: NORMAL MG/DL
HCO3 UR-SCNC: 23.6 MMOL/L (ref 21–28)
HCT VFR BLD AUTO: 24.1 % (ref 38–47)
HCT VFR BLD AUTO: 27.1 % (ref 38–47)
HCT VFR BLD AUTO: 28.7 % (ref 38–47)
HCT VFR BLD AUTO: 29.9 % (ref 38–47)
HCT VFR BLD AUTO: 31.5 % (ref 38–47)
HCT VFR BLD AUTO: 34.1 % (ref 38–47)
HGB BLD-MCNC: 10.4 G/DL (ref 12–16)
HGB BLD-MCNC: 7.4 G/DL (ref 12–16)
HGB BLD-MCNC: 8.2 G/DL (ref 12–16)
HGB BLD-MCNC: 8.3 G/DL (ref 12–16)
HGB BLD-MCNC: 9.1 G/DL (ref 12–16)
HGB BLD-MCNC: 9.2 G/DL (ref 12–16)
IMM GRANULOCYTES # BLD AUTO: 0.04 K/UL (ref 0–0.04)
IMM GRANULOCYTES # BLD AUTO: 0.04 K/UL (ref 0–0.04)
IMM GRANULOCYTES # BLD AUTO: 0.07 K/UL (ref 0–0.04)
IMM GRANULOCYTES # BLD AUTO: 0.07 K/UL (ref 0–0.04)
IMM GRANULOCYTES # BLD AUTO: 0.15 K/UL (ref 0–0.04)
IMM GRANULOCYTES # BLD AUTO: 0.34 K/UL (ref 0–0.04)
IMM GRANULOCYTES NFR BLD: 0.4 % (ref 0–0.4)
IMM GRANULOCYTES NFR BLD: 0.6 % (ref 0–0.4)
IMM GRANULOCYTES NFR BLD: 0.7 % (ref 0–0.4)
IMM GRANULOCYTES NFR BLD: 0.7 % (ref 0–0.4)
IMM GRANULOCYTES NFR BLD: 1.4 % (ref 0–0.4)
IMM GRANULOCYTES NFR BLD: 2.7 % (ref 0–0.4)
INR BLD: 1.28
KETONES UR STRIP-SCNC: NEGATIVE MG/DL
KETONES UR STRIP-SCNC: NEGATIVE MG/DL
LEUKOCYTE ESTERASE UR QL STRIP: ABNORMAL
LEUKOCYTE ESTERASE UR QL STRIP: ABNORMAL
LYMPHOCYTES # BLD AUTO: 0.79 K/UL (ref 1–4.8)
LYMPHOCYTES # BLD AUTO: 0.92 K/UL (ref 1–4.8)
LYMPHOCYTES # BLD AUTO: 1.15 K/UL (ref 1–4.8)
LYMPHOCYTES # BLD AUTO: 1.27 K/UL (ref 1–4.8)
LYMPHOCYTES # BLD AUTO: 1.63 K/UL (ref 1–4.8)
LYMPHOCYTES # BLD AUTO: 2.79 K/UL (ref 1–4.8)
LYMPHOCYTES NFR BLD AUTO: 10.2 % (ref 27–41)
LYMPHOCYTES NFR BLD AUTO: 10.8 % (ref 27–41)
LYMPHOCYTES NFR BLD AUTO: 13.1 % (ref 27–41)
LYMPHOCYTES NFR BLD AUTO: 13.2 % (ref 27–41)
LYMPHOCYTES NFR BLD AUTO: 40.1 % (ref 27–41)
LYMPHOCYTES NFR BLD AUTO: 8.4 % (ref 27–41)
MAGNESIUM SERPL-MCNC: 2.7 MG/DL (ref 1.7–2.3)
MAGNESIUM SERPL-MCNC: 2.8 MG/DL (ref 1.7–2.3)
MCH RBC QN AUTO: 28.1 PG (ref 27–31)
MCH RBC QN AUTO: 28.3 PG (ref 27–31)
MCH RBC QN AUTO: 28.5 PG (ref 27–31)
MCH RBC QN AUTO: 28.5 PG (ref 27–31)
MCH RBC QN AUTO: 28.7 PG (ref 27–31)
MCH RBC QN AUTO: 28.9 PG (ref 27–31)
MCHC RBC AUTO-ENTMCNC: 28.6 G/DL (ref 32–36)
MCHC RBC AUTO-ENTMCNC: 28.9 G/DL (ref 32–36)
MCHC RBC AUTO-ENTMCNC: 30.5 G/DL (ref 32–36)
MCHC RBC AUTO-ENTMCNC: 30.6 G/DL (ref 32–36)
MCHC RBC AUTO-ENTMCNC: 30.7 G/DL (ref 32–36)
MCHC RBC AUTO-ENTMCNC: 30.8 G/DL (ref 32–36)
MCV RBC AUTO: 92.6 FL (ref 80–96)
MCV RBC AUTO: 92.7 FL (ref 80–96)
MCV RBC AUTO: 93.8 FL (ref 80–96)
MCV RBC AUTO: 94.1 FL (ref 80–96)
MCV RBC AUTO: 97.2 FL (ref 80–96)
MCV RBC AUTO: 99.7 FL (ref 80–96)
MONOCYTES # BLD AUTO: 0.48 K/UL (ref 0–0.8)
MONOCYTES # BLD AUTO: 0.62 K/UL (ref 0–0.8)
MONOCYTES # BLD AUTO: 0.67 K/UL (ref 0–0.8)
MONOCYTES # BLD AUTO: 0.7 K/UL (ref 0–0.8)
MONOCYTES # BLD AUTO: 0.89 K/UL (ref 0–0.8)
MONOCYTES # BLD AUTO: 1.14 K/UL (ref 0–0.8)
MONOCYTES NFR BLD AUTO: 6.5 % (ref 2–6)
MONOCYTES NFR BLD AUTO: 6.6 % (ref 2–6)
MONOCYTES NFR BLD AUTO: 6.9 % (ref 2–6)
MONOCYTES NFR BLD AUTO: 7.4 % (ref 2–6)
MONOCYTES NFR BLD AUTO: 9.2 % (ref 2–6)
MONOCYTES NFR BLD AUTO: 9.3 % (ref 2–6)
MPC BLD CALC-MCNC: 10 FL (ref 9.4–12.4)
MPC BLD CALC-MCNC: 10.1 FL (ref 9.4–12.4)
MPC BLD CALC-MCNC: 8.6 FL (ref 9.4–12.4)
MPC BLD CALC-MCNC: 8.7 FL (ref 9.4–12.4)
MPC BLD CALC-MCNC: 9.6 FL (ref 9.4–12.4)
MPC BLD CALC-MCNC: 9.7 FL (ref 9.4–12.4)
MUCOUS THREADS #/AREA URNS HPF: ABNORMAL /HPF
MUCOUS THREADS #/AREA URNS HPF: ABNORMAL /HPF
NEUTROPHILS # BLD AUTO: 3.22 K/UL (ref 1.8–7.7)
NEUTROPHILS # BLD AUTO: 7.33 K/UL (ref 1.8–7.7)
NEUTROPHILS # BLD AUTO: 7.4 K/UL (ref 1.8–7.7)
NEUTROPHILS # BLD AUTO: 7.93 K/UL (ref 1.8–7.7)
NEUTROPHILS # BLD AUTO: 8.13 K/UL (ref 1.8–7.7)
NEUTROPHILS # BLD AUTO: 8.55 K/UL (ref 1.8–7.7)
NEUTROPHILS NFR BLD AUTO: 46.4 % (ref 53–65)
NEUTROPHILS NFR BLD AUTO: 69 % (ref 53–65)
NEUTROPHILS NFR BLD AUTO: 76 % (ref 53–65)
NEUTROPHILS NFR BLD AUTO: 76.4 % (ref 53–65)
NEUTROPHILS NFR BLD AUTO: 81.8 % (ref 53–65)
NEUTROPHILS NFR BLD AUTO: 84 % (ref 53–65)
NITRITE UR QL STRIP: NEGATIVE
NITRITE UR QL STRIP: POSITIVE
NRBC # BLD AUTO: 0 X10E3/UL
NRBC # BLD AUTO: 0.03 X10E3/UL
NRBC # BLD AUTO: 0.04 X10E3/UL
NRBC, AUTO (.00): 0 %
NRBC, AUTO (.00): 0.3 %
NRBC, AUTO (.00): 0.3 %
PCO2 BLDA: 39 MMHG (ref 35–48)
PH SMN: 7.39 [PH] (ref 7.35–7.45)
PH UR STRIP: 5 PH UNITS
PH UR STRIP: 5 PH UNITS
PHOSPHATE SERPL-MCNC: 3.8 MG/DL (ref 2.5–4.5)
PHOSPHATE SERPL-MCNC: 4.1 MG/DL (ref 2.5–4.5)
PLATELET # BLD AUTO: 107 K/UL (ref 150–400)
PLATELET # BLD AUTO: 115 K/UL (ref 150–400)
PLATELET # BLD AUTO: 142 K/UL (ref 150–400)
PLATELET # BLD AUTO: 165 K/UL (ref 150–400)
PLATELET # BLD AUTO: 274 K/UL (ref 150–400)
PLATELET # BLD AUTO: 287 K/UL (ref 150–400)
PO2 BLDA: 64 MMHG (ref 83–108)
POC BASE EXCESS: -1.2 MMOL/L (ref -2–3)
POC SATURATED O2: 92 % (ref 95–98)
POTASSIUM SERPL-SCNC: 3.6 MMOL/L (ref 3.5–5.1)
POTASSIUM SERPL-SCNC: 3.8 MMOL/L (ref 3.5–5.1)
POTASSIUM SERPL-SCNC: 3.9 MMOL/L (ref 3.5–5.1)
POTASSIUM SERPL-SCNC: 3.9 MMOL/L (ref 3.5–5.1)
POTASSIUM SERPL-SCNC: 4 MMOL/L (ref 3.5–5.1)
POTASSIUM SERPL-SCNC: 4.2 MMOL/L (ref 3.5–5.1)
POTASSIUM SERPL-SCNC: 4.3 MMOL/L (ref 3.5–5.1)
POTASSIUM SERPL-SCNC: 4.5 MMOL/L (ref 3.5–5.1)
POTASSIUM SERPL-SCNC: 4.8 MMOL/L (ref 3.5–5.1)
POTASSIUM SERPL-SCNC: 4.9 MMOL/L (ref 3.5–5.1)
POTASSIUM SERPL-SCNC: 5 MMOL/L (ref 3.5–5.1)
POTASSIUM SERPL-SCNC: 5.3 MMOL/L (ref 3.5–5.1)
POTASSIUM SERPL-SCNC: 5.5 MMOL/L (ref 3.5–5.1)
PROT SERPL-MCNC: 5.2 G/DL (ref 6.4–8.2)
PROT SERPL-MCNC: 5.3 G/DL (ref 6.4–8.2)
PROT SERPL-MCNC: 5.3 G/DL (ref 6.4–8.2)
PROT SERPL-MCNC: 5.4 G/DL (ref 6.4–8.2)
PROT SERPL-MCNC: 7.6 G/DL (ref 6.4–8.2)
PROT UR QL STRIP: 30
PROT UR QL STRIP: NEGATIVE
PROTHROMBIN TIME: 15.5 SECONDS (ref 11.7–14.7)
RBC # BLD AUTO: 2.56 M/UL (ref 4.2–5.4)
RBC # BLD AUTO: 2.88 M/UL (ref 4.2–5.4)
RBC # BLD AUTO: 2.89 M/UL (ref 4.2–5.4)
RBC # BLD AUTO: 3.23 M/UL (ref 4.2–5.4)
RBC # BLD AUTO: 3.24 M/UL (ref 4.2–5.4)
RBC # BLD AUTO: 3.68 M/UL (ref 4.2–5.4)
RBC # UR STRIP: ABNORMAL /UL
RBC # UR STRIP: ABNORMAL /UL
RBC #/AREA URNS HPF: ABNORMAL /HPF
RBC #/AREA URNS HPF: ABNORMAL /HPF
RENAL EPI CELLS #/AREA URNS LPF: ABNORMAL /LPF
SARS-COV+SARS-COV-2 AG RESP QL IA.RAPID: NEGATIVE
SARS-COV-2 RDRP RESP QL NAA+PROBE: NEGATIVE
SODIUM SERPL-SCNC: 139 MMOL/L (ref 136–145)
SODIUM SERPL-SCNC: 139 MMOL/L (ref 136–145)
SODIUM SERPL-SCNC: 140 MMOL/L (ref 136–145)
SODIUM SERPL-SCNC: 141 MMOL/L (ref 136–145)
SODIUM SERPL-SCNC: 146 MMOL/L (ref 136–145)
SODIUM SERPL-SCNC: 149 MMOL/L (ref 136–145)
SODIUM SERPL-SCNC: 150 MMOL/L (ref 136–145)
SODIUM SERPL-SCNC: 151 MMOL/L (ref 136–145)
SODIUM SERPL-SCNC: 153 MMOL/L (ref 136–145)
SODIUM SERPL-SCNC: 154 MMOL/L (ref 136–145)
SODIUM SERPL-SCNC: 154 MMOL/L (ref 136–145)
SP GR UR STRIP: 1.01
SP GR UR STRIP: 1.01
SQUAMOUS #/AREA URNS LPF: ABNORMAL /LPF
SQUAMOUS #/AREA URNS LPF: ABNORMAL /LPF
TRANS CELLS #/AREA URNS LPF: ABNORMAL /LPF
TRICHOMONAS #/AREA URNS HPF: ABNORMAL /HPF
UA COMPLETE W REFLEX CULTURE PNL UR: ABNORMAL
UROBILINOGEN UR STRIP-ACNC: NORMAL MG/DL
UROBILINOGEN UR STRIP-ACNC: NORMAL MG/DL
WBC # BLD AUTO: 10.69 K/UL (ref 4.5–11)
WBC # BLD AUTO: 12.41 K/UL (ref 4.5–11)
WBC # BLD AUTO: 6.95 K/UL (ref 4.5–11)
WBC # BLD AUTO: 9.05 K/UL (ref 4.5–11)
WBC # BLD AUTO: 9.44 K/UL (ref 4.5–11)
WBC # BLD AUTO: 9.6 K/UL (ref 4.5–11)
WBC #/AREA URNS HPF: ABNORMAL /HPF
WBC #/AREA URNS HPF: ABNORMAL /HPF
WBC CLUMPS, UA: ABNORMAL /HPF
WBC CLUMPS, UA: ABNORMAL /HPF
YEAST #/AREA URNS HPF: ABNORMAL /HPF
YEAST #/AREA URNS HPF: ABNORMAL /HPF

## 2023-01-01 PROCEDURE — 25000003 PHARM REV CODE 250: Performed by: STUDENT IN AN ORGANIZED HEALTH CARE EDUCATION/TRAINING PROGRAM

## 2023-01-01 PROCEDURE — C9113 INJ PANTOPRAZOLE SODIUM, VIA: HCPCS | Performed by: FAMILY MEDICINE

## 2023-01-01 PROCEDURE — 11000001 HC ACUTE MED/SURG PRIVATE ROOM

## 2023-01-01 PROCEDURE — 99232 SBSQ HOSP IP/OBS MODERATE 35: CPT | Mod: ,,, | Performed by: FAMILY MEDICINE

## 2023-01-01 PROCEDURE — 63600175 PHARM REV CODE 636 W HCPCS: Performed by: INTERNAL MEDICINE

## 2023-01-01 PROCEDURE — 99232 PR SUBSEQUENT HOSPITAL CARE,LEVL II: ICD-10-PCS | Mod: ,,, | Performed by: INTERNAL MEDICINE

## 2023-01-01 PROCEDURE — 27000221 HC OXYGEN, UP TO 24 HOURS

## 2023-01-01 PROCEDURE — 85025 COMPLETE CBC W/AUTO DIFF WBC: CPT | Performed by: INTERNAL MEDICINE

## 2023-01-01 PROCEDURE — S0030 INJECTION, METRONIDAZOLE: HCPCS | Performed by: FAMILY MEDICINE

## 2023-01-01 PROCEDURE — 51702 INSERT TEMP BLADDER CATH: CPT

## 2023-01-01 PROCEDURE — 97530 THERAPEUTIC ACTIVITIES: CPT

## 2023-01-01 PROCEDURE — 63600175 PHARM REV CODE 636 W HCPCS: Performed by: ANESTHESIOLOGY

## 2023-01-01 PROCEDURE — 99900035 HC TECH TIME PER 15 MIN (STAT)

## 2023-01-01 PROCEDURE — 99232 SBSQ HOSP IP/OBS MODERATE 35: CPT | Mod: ,,, | Performed by: INTERNAL MEDICINE

## 2023-01-01 PROCEDURE — 81001 URINALYSIS AUTO W/SCOPE: CPT | Performed by: INTERNAL MEDICINE

## 2023-01-01 PROCEDURE — D9220A PRA ANESTHESIA: ICD-10-PCS | Mod: ANES,,, | Performed by: ANESTHESIOLOGY

## 2023-01-01 PROCEDURE — C9113 INJ PANTOPRAZOLE SODIUM, VIA: HCPCS | Performed by: INTERNAL MEDICINE

## 2023-01-01 PROCEDURE — 27000165 HC TUBE, ETT CUFFED: Performed by: ANESTHESIOLOGY

## 2023-01-01 PROCEDURE — 63600175 PHARM REV CODE 636 W HCPCS: Performed by: ORTHOPAEDIC SURGERY

## 2023-01-01 PROCEDURE — 99285 EMERGENCY DEPT VISIT HI MDM: CPT | Mod: CS,,, | Performed by: EMERGENCY MEDICINE

## 2023-01-01 PROCEDURE — 99233 SBSQ HOSP IP/OBS HIGH 50: CPT | Mod: ,,, | Performed by: STUDENT IN AN ORGANIZED HEALTH CARE EDUCATION/TRAINING PROGRAM

## 2023-01-01 PROCEDURE — 27000655: Performed by: ANESTHESIOLOGY

## 2023-01-01 PROCEDURE — 25000003 PHARM REV CODE 250: Performed by: FAMILY MEDICINE

## 2023-01-01 PROCEDURE — 37000009 HC ANESTHESIA EA ADD 15 MINS: Performed by: ORTHOPAEDIC SURGERY

## 2023-01-01 PROCEDURE — 82962 GLUCOSE BLOOD TEST: CPT

## 2023-01-01 PROCEDURE — 25000003 PHARM REV CODE 250: Performed by: INTERNAL MEDICINE

## 2023-01-01 PROCEDURE — 36000710: Performed by: ORTHOPAEDIC SURGERY

## 2023-01-01 PROCEDURE — 25000003 PHARM REV CODE 250: Performed by: HOSPITALIST

## 2023-01-01 PROCEDURE — 87428 SARSCOV & INF VIR A&B AG IA: CPT | Performed by: INTERNAL MEDICINE

## 2023-01-01 PROCEDURE — 27000510 HC BLANKET BAIR HUGGER ANY SIZE: Performed by: ANESTHESIOLOGY

## 2023-01-01 PROCEDURE — 99232 PR SUBSEQUENT HOSPITAL CARE,LEVL II: ICD-10-PCS | Mod: ,,, | Performed by: FAMILY MEDICINE

## 2023-01-01 PROCEDURE — 63600175 PHARM REV CODE 636 W HCPCS: Performed by: FAMILY MEDICINE

## 2023-01-01 PROCEDURE — 80048 BASIC METABOLIC PNL TOTAL CA: CPT | Performed by: STUDENT IN AN ORGANIZED HEALTH CARE EDUCATION/TRAINING PROGRAM

## 2023-01-01 PROCEDURE — D9220A PRA ANESTHESIA: Mod: ANES,,, | Performed by: ANESTHESIOLOGY

## 2023-01-01 PROCEDURE — 87635 SARS-COV-2 COVID-19 AMP PRB: CPT | Performed by: EMERGENCY MEDICINE

## 2023-01-01 PROCEDURE — 63600175 PHARM REV CODE 636 W HCPCS: Performed by: HOSPITALIST

## 2023-01-01 PROCEDURE — 99232 PR SUBSEQUENT HOSPITAL CARE,LEVL II: ICD-10-PCS | Mod: ,,, | Performed by: STUDENT IN AN ORGANIZED HEALTH CARE EDUCATION/TRAINING PROGRAM

## 2023-01-01 PROCEDURE — 63600175 PHARM REV CODE 636 W HCPCS: Performed by: STUDENT IN AN ORGANIZED HEALTH CARE EDUCATION/TRAINING PROGRAM

## 2023-01-01 PROCEDURE — 82803 BLOOD GASES ANY COMBINATION: CPT

## 2023-01-01 PROCEDURE — 85025 COMPLETE CBC W/AUTO DIFF WBC: CPT | Performed by: ORTHOPAEDIC SURGERY

## 2023-01-01 PROCEDURE — D9220A PRA ANESTHESIA: ICD-10-PCS | Mod: CRNA,,, | Performed by: NURSE ANESTHETIST, CERTIFIED REGISTERED

## 2023-01-01 PROCEDURE — 25000003 PHARM REV CODE 250: Performed by: ORTHOPAEDIC SURGERY

## 2023-01-01 PROCEDURE — 87077 CULTURE AEROBIC IDENTIFY: CPT | Performed by: EMERGENCY MEDICINE

## 2023-01-01 PROCEDURE — 63600175 PHARM REV CODE 636 W HCPCS: Performed by: EMERGENCY MEDICINE

## 2023-01-01 PROCEDURE — 80053 COMPREHEN METABOLIC PANEL: CPT | Performed by: INTERNAL MEDICINE

## 2023-01-01 PROCEDURE — 37000008 HC ANESTHESIA 1ST 15 MINUTES: Performed by: ORTHOPAEDIC SURGERY

## 2023-01-01 PROCEDURE — 99233 PR SUBSEQUENT HOSPITAL CARE,LEVL III: ICD-10-PCS | Mod: ,,, | Performed by: STUDENT IN AN ORGANIZED HEALTH CARE EDUCATION/TRAINING PROGRAM

## 2023-01-01 PROCEDURE — 96374 THER/PROPH/DIAG INJ IV PUSH: CPT

## 2023-01-01 PROCEDURE — 87086 URINE CULTURE/COLONY COUNT: CPT | Performed by: EMERGENCY MEDICINE

## 2023-01-01 PROCEDURE — 36000711: Performed by: ORTHOPAEDIC SURGERY

## 2023-01-01 PROCEDURE — 94761 N-INVAS EAR/PLS OXIMETRY MLT: CPT

## 2023-01-01 PROCEDURE — 99222 PR INITIAL HOSPITAL CARE,LEVL II: ICD-10-PCS | Mod: ,,, | Performed by: HOSPITALIST

## 2023-01-01 PROCEDURE — 36600 WITHDRAWAL OF ARTERIAL BLOOD: CPT

## 2023-01-01 PROCEDURE — 87040 BLOOD CULTURE FOR BACTERIA: CPT | Performed by: INTERNAL MEDICINE

## 2023-01-01 PROCEDURE — 99222 1ST HOSP IP/OBS MODERATE 55: CPT | Mod: ,,, | Performed by: HOSPITALIST

## 2023-01-01 PROCEDURE — 25000003 PHARM REV CODE 250

## 2023-01-01 PROCEDURE — 80048 BASIC METABOLIC PNL TOTAL CA: CPT | Performed by: ORTHOPAEDIC SURGERY

## 2023-01-01 PROCEDURE — 85025 COMPLETE CBC W/AUTO DIFF WBC: CPT | Performed by: EMERGENCY MEDICINE

## 2023-01-01 PROCEDURE — 27245 PR OPEN FIX INTER/SUBTROCH FX,IMPLNT: ICD-10-PCS | Mod: LT,,, | Performed by: ORTHOPAEDIC SURGERY

## 2023-01-01 PROCEDURE — 96372 THER/PROPH/DIAG INJ SC/IM: CPT

## 2023-01-01 PROCEDURE — 27201423 OPTIME MED/SURG SUP & DEVICES STERILE SUPPLY: Performed by: ORTHOPAEDIC SURGERY

## 2023-01-01 PROCEDURE — 81001 URINALYSIS AUTO W/SCOPE: CPT | Performed by: EMERGENCY MEDICINE

## 2023-01-01 PROCEDURE — 83735 ASSAY OF MAGNESIUM: CPT | Performed by: INTERNAL MEDICINE

## 2023-01-01 PROCEDURE — 80053 COMPREHEN METABOLIC PANEL: CPT | Performed by: EMERGENCY MEDICINE

## 2023-01-01 PROCEDURE — 63600175 PHARM REV CODE 636 W HCPCS: Performed by: NURSE ANESTHETIST, CERTIFIED REGISTERED

## 2023-01-01 PROCEDURE — 93010 EKG 12-LEAD: ICD-10-PCS | Mod: ,,, | Performed by: STUDENT IN AN ORGANIZED HEALTH CARE EDUCATION/TRAINING PROGRAM

## 2023-01-01 PROCEDURE — 80053 COMPREHEN METABOLIC PANEL: CPT | Performed by: ORTHOPAEDIC SURGERY

## 2023-01-01 PROCEDURE — 99285 EMERGENCY DEPT VISIT HI MDM: CPT | Mod: 25

## 2023-01-01 PROCEDURE — C1713 ANCHOR/SCREW BN/BN,TIS/BN: HCPCS | Performed by: ORTHOPAEDIC SURGERY

## 2023-01-01 PROCEDURE — C1769 GUIDE WIRE: HCPCS | Performed by: ORTHOPAEDIC SURGERY

## 2023-01-01 PROCEDURE — 27245 TREAT THIGH FRACTURE: CPT | Mod: LT,,, | Performed by: ORTHOPAEDIC SURGERY

## 2023-01-01 PROCEDURE — 99232 SBSQ HOSP IP/OBS MODERATE 35: CPT | Mod: ,,, | Performed by: STUDENT IN AN ORGANIZED HEALTH CARE EDUCATION/TRAINING PROGRAM

## 2023-01-01 PROCEDURE — 99285 PR EMERGENCY DEPT VISIT,LEVEL V: ICD-10-PCS | Mod: CS,,, | Performed by: EMERGENCY MEDICINE

## 2023-01-01 PROCEDURE — 99231 SBSQ HOSP IP/OBS SF/LOW 25: CPT | Mod: ,,, | Performed by: INTERNAL MEDICINE

## 2023-01-01 PROCEDURE — 82140 ASSAY OF AMMONIA: CPT | Performed by: FAMILY MEDICINE

## 2023-01-01 PROCEDURE — 27000689 HC BLADE LARYNGOSCOPE ANY SIZE: Performed by: ANESTHESIOLOGY

## 2023-01-01 PROCEDURE — 99239 HOSP IP/OBS DSCHRG MGMT >30: CPT | Mod: ,,, | Performed by: INTERNAL MEDICINE

## 2023-01-01 PROCEDURE — D9220A PRA ANESTHESIA: Mod: CRNA,,, | Performed by: NURSE ANESTHETIST, CERTIFIED REGISTERED

## 2023-01-01 PROCEDURE — 84100 ASSAY OF PHOSPHORUS: CPT | Performed by: INTERNAL MEDICINE

## 2023-01-01 PROCEDURE — 27000716 HC OXISENSOR PROBE, ANY SIZE: Performed by: ANESTHESIOLOGY

## 2023-01-01 PROCEDURE — 85730 THROMBOPLASTIN TIME PARTIAL: CPT | Performed by: EMERGENCY MEDICINE

## 2023-01-01 PROCEDURE — 99239 HOSP IP/OBS DSCHRG MGMT >30: CPT | Mod: ,,, | Performed by: FAMILY MEDICINE

## 2023-01-01 PROCEDURE — 99239 PR HOSPITAL DISCHARGE DAY,>30 MIN: ICD-10-PCS | Mod: ,,, | Performed by: FAMILY MEDICINE

## 2023-01-01 PROCEDURE — 85379 FIBRIN DEGRADATION QUANT: CPT

## 2023-01-01 PROCEDURE — 93005 ELECTROCARDIOGRAM TRACING: CPT

## 2023-01-01 PROCEDURE — 25000003 PHARM REV CODE 250: Performed by: NURSE ANESTHETIST, CERTIFIED REGISTERED

## 2023-01-01 PROCEDURE — 99239 PR HOSPITAL DISCHARGE DAY,>30 MIN: ICD-10-PCS | Mod: ,,, | Performed by: INTERNAL MEDICINE

## 2023-01-01 PROCEDURE — 71000033 HC RECOVERY, INTIAL HOUR: Performed by: ORTHOPAEDIC SURGERY

## 2023-01-01 PROCEDURE — 97535 SELF CARE MNGMENT TRAINING: CPT

## 2023-01-01 PROCEDURE — 97165 OT EVAL LOW COMPLEX 30 MIN: CPT

## 2023-01-01 PROCEDURE — 85610 PROTHROMBIN TIME: CPT | Performed by: EMERGENCY MEDICINE

## 2023-01-01 PROCEDURE — 27202344 HC EYESHIELD: Performed by: ANESTHESIOLOGY

## 2023-01-01 PROCEDURE — 99231 PR SUBSEQUENT HOSPITAL CARE,LEVL I: ICD-10-PCS | Mod: ,,, | Performed by: INTERNAL MEDICINE

## 2023-01-01 PROCEDURE — 93010 ELECTROCARDIOGRAM REPORT: CPT | Mod: ,,, | Performed by: STUDENT IN AN ORGANIZED HEALTH CARE EDUCATION/TRAINING PROGRAM

## 2023-01-01 DEVICE — KIT NAIL IM GAMMA 11X380MM TI: Type: IMPLANTABLE DEVICE | Site: HIP | Status: FUNCTIONAL

## 2023-01-01 DEVICE — SCREW LAG TITANIUM 10.5X95: Type: IMPLANTABLE DEVICE | Site: HIP | Status: FUNCTIONAL

## 2023-01-01 DEVICE — SCREW LOCKING 5 X 42.5: Type: IMPLANTABLE DEVICE | Site: HIP | Status: FUNCTIONAL

## 2023-01-01 RX ORDER — ORPHENADRINE CITRATE 30 MG/ML
60 INJECTION INTRAMUSCULAR; INTRAVENOUS
Status: COMPLETED | OUTPATIENT
Start: 2023-01-01 | End: 2023-01-01

## 2023-01-01 RX ORDER — HYDROCODONE BITARTRATE AND ACETAMINOPHEN 5; 325 MG/1; MG/1
1 TABLET ORAL EVERY 4 HOURS PRN
Status: DISCONTINUED | OUTPATIENT
Start: 2023-01-01 | End: 2023-01-01

## 2023-01-01 RX ORDER — DILTIAZEM HYDROCHLORIDE 180 MG/1
180 CAPSULE, COATED, EXTENDED RELEASE ORAL DAILY
Qty: 90 CAPSULE | Refills: 3
Start: 2023-01-01

## 2023-01-01 RX ORDER — ENOXAPARIN SODIUM 100 MG/ML
40 INJECTION SUBCUTANEOUS EVERY 24 HOURS
Status: DISCONTINUED | OUTPATIENT
Start: 2023-01-01 | End: 2023-01-01

## 2023-01-01 RX ORDER — MORPHINE SULFATE 10 MG/ML
4 INJECTION INTRAMUSCULAR; INTRAVENOUS; SUBCUTANEOUS EVERY 5 MIN PRN
Status: DISCONTINUED | OUTPATIENT
Start: 2023-01-01 | End: 2023-01-01

## 2023-01-01 RX ORDER — GENTAMICIN SULFATE 80 MG/100ML
80 INJECTION, SOLUTION INTRAVENOUS ONCE
Status: COMPLETED | OUTPATIENT
Start: 2023-01-01 | End: 2023-01-01

## 2023-01-01 RX ORDER — OXYCODONE HYDROCHLORIDE 5 MG/1
5 TABLET ORAL EVERY 4 HOURS PRN
Status: DISCONTINUED | OUTPATIENT
Start: 2023-01-01 | End: 2023-01-01 | Stop reason: HOSPADM

## 2023-01-01 RX ORDER — DONEPEZIL HYDROCHLORIDE 5 MG/1
10 TABLET, FILM COATED ORAL NIGHTLY
Status: DISCONTINUED | OUTPATIENT
Start: 2023-01-01 | End: 2023-01-01

## 2023-01-01 RX ORDER — FAMOTIDINE 10 MG/ML
20 INJECTION INTRAVENOUS 2 TIMES DAILY
Status: DISCONTINUED | OUTPATIENT
Start: 2023-01-01 | End: 2023-01-01

## 2023-01-01 RX ORDER — CLINDAMYCIN PHOSPHATE 900 MG/50ML
INJECTION, SOLUTION INTRAVENOUS
Status: DISCONTINUED | OUTPATIENT
Start: 2023-01-01 | End: 2023-01-01

## 2023-01-01 RX ORDER — DILTIAZEM HYDROCHLORIDE 180 MG/1
180 CAPSULE, COATED, EXTENDED RELEASE ORAL DAILY
Status: DISCONTINUED | OUTPATIENT
Start: 2023-01-01 | End: 2023-01-01

## 2023-01-01 RX ORDER — HYDROMORPHONE HYDROCHLORIDE 2 MG/ML
0.5 INJECTION, SOLUTION INTRAMUSCULAR; INTRAVENOUS; SUBCUTANEOUS EVERY 5 MIN PRN
Status: DISCONTINUED | OUTPATIENT
Start: 2023-01-01 | End: 2023-01-01

## 2023-01-01 RX ORDER — MORPHINE SULFATE 2 MG/ML
2 INJECTION, SOLUTION INTRAMUSCULAR; INTRAVENOUS
Status: COMPLETED | OUTPATIENT
Start: 2023-01-01 | End: 2023-01-01

## 2023-01-01 RX ORDER — LORAZEPAM 2 MG/ML
2 INJECTION INTRAMUSCULAR
Status: DISCONTINUED | OUTPATIENT
Start: 2023-01-01 | End: 2023-01-01 | Stop reason: HOSPADM

## 2023-01-01 RX ORDER — MEMANTINE HYDROCHLORIDE 10 MG/1
10 TABLET ORAL 2 TIMES DAILY
Status: DISCONTINUED | OUTPATIENT
Start: 2023-01-01 | End: 2023-01-01 | Stop reason: HOSPADM

## 2023-01-01 RX ORDER — ONDANSETRON 2 MG/ML
INJECTION INTRAMUSCULAR; INTRAVENOUS
Status: DISCONTINUED | OUTPATIENT
Start: 2023-01-01 | End: 2023-01-01

## 2023-01-01 RX ORDER — SODIUM CHLORIDE 9 MG/ML
75 INJECTION, SOLUTION INTRAVENOUS CONTINUOUS
Status: DISCONTINUED | OUTPATIENT
Start: 2023-01-01 | End: 2023-01-01

## 2023-01-01 RX ORDER — SCOLOPAMINE TRANSDERMAL SYSTEM 1 MG/1
1 PATCH, EXTENDED RELEASE TRANSDERMAL
Status: DISCONTINUED | OUTPATIENT
Start: 2023-01-01 | End: 2023-01-01 | Stop reason: HOSPADM

## 2023-01-01 RX ORDER — GLYCOPYRROLATE 0.2 MG/ML
INJECTION INTRAMUSCULAR; INTRAVENOUS
Status: DISCONTINUED | OUTPATIENT
Start: 2023-01-01 | End: 2023-01-01

## 2023-01-01 RX ORDER — MUPIROCIN 20 MG/G
1 OINTMENT TOPICAL 2 TIMES DAILY
Status: DISPENSED | OUTPATIENT
Start: 2023-01-01 | End: 2023-01-01

## 2023-01-01 RX ORDER — MORPHINE SULFATE 2 MG/ML
2 INJECTION, SOLUTION INTRAMUSCULAR; INTRAVENOUS EVERY 4 HOURS PRN
Status: DISCONTINUED | OUTPATIENT
Start: 2023-01-01 | End: 2023-01-01

## 2023-01-01 RX ORDER — SIMETHICONE 80 MG
1 TABLET,CHEWABLE ORAL 3 TIMES DAILY PRN
Status: DISCONTINUED | OUTPATIENT
Start: 2023-01-01 | End: 2023-01-01 | Stop reason: HOSPADM

## 2023-01-01 RX ORDER — SERTRALINE HYDROCHLORIDE 50 MG/1
50 TABLET, FILM COATED ORAL DAILY
Status: DISCONTINUED | OUTPATIENT
Start: 2023-01-01 | End: 2023-01-01

## 2023-01-01 RX ORDER — PHENYLEPHRINE HYDROCHLORIDE 10 MG/ML
INJECTION INTRAVENOUS
Status: DISCONTINUED | OUTPATIENT
Start: 2023-01-01 | End: 2023-01-01

## 2023-01-01 RX ORDER — LOSARTAN POTASSIUM 100 MG/1
100 TABLET ORAL DAILY
Status: DISCONTINUED | OUTPATIENT
Start: 2023-01-01 | End: 2023-01-01

## 2023-01-01 RX ORDER — ACETAMINOPHEN 325 MG/1
650 TABLET ORAL EVERY 8 HOURS PRN
Status: DISCONTINUED | OUTPATIENT
Start: 2023-01-01 | End: 2023-01-01 | Stop reason: HOSPADM

## 2023-01-01 RX ORDER — METOPROLOL TARTRATE 1 MG/ML
5 INJECTION, SOLUTION INTRAVENOUS EVERY 8 HOURS
Status: DISCONTINUED | OUTPATIENT
Start: 2023-01-01 | End: 2023-01-01

## 2023-01-01 RX ORDER — PANTOPRAZOLE SODIUM 40 MG/10ML
40 INJECTION, POWDER, LYOPHILIZED, FOR SOLUTION INTRAVENOUS DAILY
Status: DISCONTINUED | OUTPATIENT
Start: 2023-01-01 | End: 2023-01-01

## 2023-01-01 RX ORDER — CEFAZOLIN SODIUM 1 G/3ML
INJECTION, POWDER, FOR SOLUTION INTRAMUSCULAR; INTRAVENOUS
Status: DISCONTINUED | OUTPATIENT
Start: 2023-01-01 | End: 2023-01-01

## 2023-01-01 RX ORDER — ONDANSETRON 2 MG/ML
4 INJECTION INTRAMUSCULAR; INTRAVENOUS EVERY 8 HOURS PRN
Status: DISCONTINUED | OUTPATIENT
Start: 2023-01-01 | End: 2023-01-01 | Stop reason: HOSPADM

## 2023-01-01 RX ORDER — DIPHENHYDRAMINE HYDROCHLORIDE 50 MG/ML
25 INJECTION INTRAMUSCULAR; INTRAVENOUS EVERY 6 HOURS PRN
Status: DISCONTINUED | OUTPATIENT
Start: 2023-01-01 | End: 2023-01-01

## 2023-01-01 RX ORDER — EZETIMIBE AND SIMVASTATIN 10; 20 MG/1; MG/1
TABLET ORAL
Qty: 90 TABLET | Refills: 3
Start: 2023-01-01

## 2023-01-01 RX ORDER — MEMANTINE HYDROCHLORIDE 28 MG/1
28 CAPSULE, EXTENDED RELEASE ORAL DAILY
Status: DISCONTINUED | OUTPATIENT
Start: 2023-01-01 | End: 2023-01-01

## 2023-01-01 RX ORDER — METOPROLOL TARTRATE 50 MG/1
50 TABLET ORAL 2 TIMES DAILY
Status: DISCONTINUED | OUTPATIENT
Start: 2023-01-01 | End: 2023-01-01

## 2023-01-01 RX ORDER — MORPHINE SULFATE 4 MG/ML
4 INJECTION, SOLUTION INTRAMUSCULAR; INTRAVENOUS EVERY 4 HOURS PRN
Status: DISCONTINUED | OUTPATIENT
Start: 2023-01-01 | End: 2023-01-01

## 2023-01-01 RX ORDER — SODIUM CHLORIDE 9 MG/ML
INJECTION, SOLUTION INTRAVENOUS CONTINUOUS
Status: DISPENSED | OUTPATIENT
Start: 2023-01-01 | End: 2023-01-01

## 2023-01-01 RX ORDER — CLINDAMYCIN PHOSPHATE 900 MG/50ML
900 INJECTION, SOLUTION INTRAVENOUS
Status: COMPLETED | OUTPATIENT
Start: 2023-01-01 | End: 2023-01-01

## 2023-01-01 RX ORDER — PANTOPRAZOLE SODIUM 40 MG/10ML
40 INJECTION, POWDER, LYOPHILIZED, FOR SOLUTION INTRAVENOUS DAILY
Qty: 30 EACH | Refills: 11 | Status: ON HOLD
Start: 2023-01-01 | End: 2023-01-01 | Stop reason: CLARIF

## 2023-01-01 RX ORDER — AMIODARONE HCL/D5W 450 MG/250
0.5 PLASTIC BAG, INJECTION (ML) INTRAVENOUS CONTINUOUS
Status: DISCONTINUED | OUTPATIENT
Start: 2023-01-01 | End: 2023-01-01

## 2023-01-01 RX ORDER — MUPIROCIN 20 MG/G
OINTMENT TOPICAL 2 TIMES DAILY
Status: ACTIVE | OUTPATIENT
Start: 2023-01-01 | End: 2023-01-01

## 2023-01-01 RX ORDER — ISOSORBIDE MONONITRATE 60 MG/1
60 TABLET, EXTENDED RELEASE ORAL DAILY
Status: DISCONTINUED | OUTPATIENT
Start: 2023-01-01 | End: 2023-01-01

## 2023-01-01 RX ORDER — MORPHINE SULFATE 2 MG/ML
2 INJECTION, SOLUTION INTRAMUSCULAR; INTRAVENOUS
Status: DISCONTINUED | OUTPATIENT
Start: 2023-01-01 | End: 2023-01-01 | Stop reason: HOSPADM

## 2023-01-01 RX ORDER — MUPIROCIN 20 MG/G
OINTMENT TOPICAL 2 TIMES DAILY
Status: DISCONTINUED | OUTPATIENT
Start: 2023-01-01 | End: 2023-01-01

## 2023-01-01 RX ORDER — MUPIROCIN 20 MG/G
OINTMENT TOPICAL 2 TIMES DAILY
Status: CANCELLED | OUTPATIENT
Start: 2023-01-01 | End: 2023-01-01

## 2023-01-01 RX ORDER — ACETAMINOPHEN 500 MG
1000 TABLET ORAL EVERY 6 HOURS PRN
Status: DISCONTINUED | OUTPATIENT
Start: 2023-01-01 | End: 2023-01-01 | Stop reason: HOSPADM

## 2023-01-01 RX ORDER — SODIUM CHLORIDE 9 MG/ML
INJECTION, SOLUTION INTRAVENOUS
Status: DISPENSED
Start: 2023-01-01 | End: 2023-01-01

## 2023-01-01 RX ORDER — BISACODYL 10 MG
10 SUPPOSITORY, RECTAL RECTAL DAILY PRN
Status: DISCONTINUED | OUTPATIENT
Start: 2023-01-01 | End: 2023-01-01 | Stop reason: HOSPADM

## 2023-01-01 RX ORDER — LABETALOL HYDROCHLORIDE 5 MG/ML
20 INJECTION, SOLUTION INTRAVENOUS EVERY 6 HOURS PRN
Status: DISCONTINUED | OUTPATIENT
Start: 2023-01-01 | End: 2023-01-01 | Stop reason: HOSPADM

## 2023-01-01 RX ORDER — NEBIVOLOL 5 MG/1
5 TABLET ORAL EVERY MORNING
Qty: 90 TABLET | Refills: 3
Start: 2023-01-01

## 2023-01-01 RX ORDER — GUAIFENESIN/DEXTROMETHORPHAN 100-10MG/5
10 SYRUP ORAL EVERY 6 HOURS PRN
Status: DISCONTINUED | OUTPATIENT
Start: 2023-01-01 | End: 2023-01-01 | Stop reason: HOSPADM

## 2023-01-01 RX ORDER — MEMANTINE HYDROCHLORIDE AND DONEPEZIL HYDROCHLORIDE 28; 10 MG/1; MG/1
1 CAPSULE ORAL NIGHTLY
Qty: 90 EACH | Refills: 3
Start: 2023-01-01

## 2023-01-01 RX ORDER — METRONIDAZOLE 500 MG/100ML
500 INJECTION, SOLUTION INTRAVENOUS
Status: DISCONTINUED | OUTPATIENT
Start: 2023-01-01 | End: 2023-01-01

## 2023-01-01 RX ORDER — IBUPROFEN 400 MG/1
400 TABLET ORAL EVERY 6 HOURS PRN
Status: DISCONTINUED | OUTPATIENT
Start: 2023-01-01 | End: 2023-01-01 | Stop reason: HOSPADM

## 2023-01-01 RX ORDER — ONDANSETRON 4 MG/1
8 TABLET, ORALLY DISINTEGRATING ORAL EVERY 8 HOURS PRN
Status: DISCONTINUED | OUTPATIENT
Start: 2023-01-01 | End: 2023-01-01 | Stop reason: HOSPADM

## 2023-01-01 RX ORDER — LEVOTHYROXINE SODIUM 112 UG/1
112 TABLET ORAL
Status: DISCONTINUED | OUTPATIENT
Start: 2023-01-01 | End: 2023-01-01

## 2023-01-01 RX ORDER — SODIUM CHLORIDE, SODIUM LACTATE, POTASSIUM CHLORIDE, CALCIUM CHLORIDE 600; 310; 30; 20 MG/100ML; MG/100ML; MG/100ML; MG/100ML
125 INJECTION, SOLUTION INTRAVENOUS CONTINUOUS
Status: DISCONTINUED | OUTPATIENT
Start: 2023-01-01 | End: 2023-01-01

## 2023-01-01 RX ORDER — SODIUM CHLORIDE 9 MG/ML
INJECTION, SOLUTION INTRAVENOUS CONTINUOUS
Status: DISCONTINUED | OUTPATIENT
Start: 2023-01-01 | End: 2023-01-01

## 2023-01-01 RX ORDER — DIAZEPAM 2 MG/1
2 TABLET ORAL NIGHTLY PRN
Status: DISCONTINUED | OUTPATIENT
Start: 2023-01-01 | End: 2023-01-01

## 2023-01-01 RX ORDER — FUROSEMIDE 40 MG/1
40 TABLET ORAL DAILY
Qty: 90 TABLET | Refills: 3
Start: 2023-01-01

## 2023-01-01 RX ORDER — DEXTROSE MONOHYDRATE, SODIUM CHLORIDE, AND POTASSIUM CHLORIDE 50; 1.49; 4.5 G/1000ML; G/1000ML; G/1000ML
INJECTION, SOLUTION INTRAVENOUS CONTINUOUS
Status: DISCONTINUED | OUTPATIENT
Start: 2023-01-01 | End: 2023-01-01

## 2023-01-01 RX ORDER — ISOSORBIDE MONONITRATE 60 MG/1
60 TABLET, EXTENDED RELEASE ORAL DAILY
Qty: 90 TABLET | Refills: 3
Start: 2023-01-01

## 2023-01-01 RX ORDER — PANTOPRAZOLE SODIUM 40 MG/1
TABLET, DELAYED RELEASE ORAL
Qty: 90 TABLET | Refills: 3
Start: 2023-01-01

## 2023-01-01 RX ORDER — MEPERIDINE HYDROCHLORIDE 25 MG/ML
25 INJECTION INTRAMUSCULAR; INTRAVENOUS; SUBCUTANEOUS EVERY 10 MIN PRN
Status: ACTIVE | OUTPATIENT
Start: 2023-01-01 | End: 2023-01-01

## 2023-01-01 RX ORDER — MICONAZOLE NITRATE 2 %
POWDER (GRAM) TOPICAL 2 TIMES DAILY
Status: DISCONTINUED | OUTPATIENT
Start: 2023-01-01 | End: 2023-01-01 | Stop reason: HOSPADM

## 2023-01-01 RX ORDER — ONDANSETRON 2 MG/ML
8 INJECTION INTRAMUSCULAR; INTRAVENOUS EVERY 6 HOURS PRN
Status: DISCONTINUED | OUTPATIENT
Start: 2023-01-01 | End: 2023-01-01 | Stop reason: HOSPADM

## 2023-01-01 RX ORDER — BISACODYL 5 MG
10 TABLET, DELAYED RELEASE (ENTERIC COATED) ORAL DAILY PRN
Status: DISCONTINUED | OUTPATIENT
Start: 2023-01-01 | End: 2023-01-01 | Stop reason: HOSPADM

## 2023-01-01 RX ORDER — FAMOTIDINE 20 MG/1
20 TABLET, FILM COATED ORAL 2 TIMES DAILY
Status: DISCONTINUED | OUTPATIENT
Start: 2023-01-01 | End: 2023-01-01

## 2023-01-01 RX ORDER — SODIUM CHLORIDE 9 MG/ML
INJECTION, SOLUTION INTRAVENOUS
Status: COMPLETED
Start: 2023-01-01 | End: 2023-01-01

## 2023-01-01 RX ORDER — FAMOTIDINE 20 MG/1
20 TABLET, FILM COATED ORAL DAILY
Status: DISCONTINUED | OUTPATIENT
Start: 2023-01-01 | End: 2023-01-01

## 2023-01-01 RX ORDER — ONDANSETRON 2 MG/ML
4 INJECTION INTRAMUSCULAR; INTRAVENOUS DAILY PRN
Status: DISCONTINUED | OUTPATIENT
Start: 2023-01-01 | End: 2023-01-01 | Stop reason: HOSPADM

## 2023-01-01 RX ORDER — ROCURONIUM BROMIDE 10 MG/ML
INJECTION, SOLUTION INTRAVENOUS
Status: DISCONTINUED | OUTPATIENT
Start: 2023-01-01 | End: 2023-01-01

## 2023-01-01 RX ORDER — METOPROLOL TARTRATE 1 MG/ML
5 INJECTION, SOLUTION INTRAVENOUS EVERY 4 HOURS PRN
Status: DISCONTINUED | OUTPATIENT
Start: 2023-01-01 | End: 2023-01-01 | Stop reason: HOSPADM

## 2023-01-01 RX ORDER — OLMESARTAN MEDOXOMIL 20 MG/1
20 TABLET ORAL DAILY
Qty: 90 TABLET | Refills: 3
Start: 2023-01-01

## 2023-01-01 RX ORDER — FENTANYL 12.5 UG/1
1 PATCH TRANSDERMAL
Status: DISCONTINUED | OUTPATIENT
Start: 2023-01-01 | End: 2023-01-01 | Stop reason: HOSPADM

## 2023-01-01 RX ORDER — LIDOCAINE HYDROCHLORIDE 20 MG/ML
INJECTION, SOLUTION EPIDURAL; INFILTRATION; INTRACAUDAL; PERINEURAL
Status: DISCONTINUED | OUTPATIENT
Start: 2023-01-01 | End: 2023-01-01

## 2023-01-01 RX ORDER — PROPOFOL 10 MG/ML
VIAL (ML) INTRAVENOUS
Status: DISCONTINUED | OUTPATIENT
Start: 2023-01-01 | End: 2023-01-01

## 2023-01-01 RX ORDER — OXYCODONE HYDROCHLORIDE 5 MG/1
5 TABLET ORAL
Status: DISCONTINUED | OUTPATIENT
Start: 2023-01-01 | End: 2023-01-01

## 2023-01-01 RX ORDER — TRAZODONE HYDROCHLORIDE 50 MG/1
50 TABLET ORAL NIGHTLY PRN
Status: DISCONTINUED | OUTPATIENT
Start: 2023-01-01 | End: 2023-01-01

## 2023-01-01 RX ORDER — MEMANTINE HYDROCHLORIDE 5 MG/1
10 TABLET ORAL 2 TIMES DAILY
Status: DISCONTINUED | OUTPATIENT
Start: 2023-01-01 | End: 2023-01-01

## 2023-01-01 RX ORDER — PANTOPRAZOLE SODIUM 40 MG/1
40 TABLET, DELAYED RELEASE ORAL DAILY
Status: DISCONTINUED | OUTPATIENT
Start: 2023-01-01 | End: 2023-01-01

## 2023-01-01 RX ORDER — ACETAMINOPHEN 325 MG/1
650 TABLET ORAL EVERY 4 HOURS PRN
Status: DISCONTINUED | OUTPATIENT
Start: 2023-01-01 | End: 2023-01-01 | Stop reason: HOSPADM

## 2023-01-01 RX ADMIN — DILTIAZEM HYDROCHLORIDE 180 MG: 180 CAPSULE, COATED, EXTENDED RELEASE ORAL at 09:02

## 2023-01-01 RX ADMIN — METRONIDAZOLE 500 MG: 5 INJECTION, SOLUTION INTRAVENOUS at 04:01

## 2023-01-01 RX ADMIN — SERTRALINE HYDROCHLORIDE 50 MG: 50 TABLET ORAL at 09:02

## 2023-01-01 RX ADMIN — METRONIDAZOLE 500 MG: 5 INJECTION, SOLUTION INTRAVENOUS at 03:01

## 2023-01-01 RX ADMIN — LORAZEPAM 2 MG: 2 INJECTION INTRAMUSCULAR; INTRAVENOUS at 09:02

## 2023-01-01 RX ADMIN — SODIUM CHLORIDE: 9 INJECTION, SOLUTION INTRAVENOUS at 09:01

## 2023-01-01 RX ADMIN — SCOPOLAMINE 1 PATCH: 1 SYSTEM TRANSDERMAL at 03:02

## 2023-01-01 RX ADMIN — MUPIROCIN 1 G: 20 OINTMENT TOPICAL at 09:01

## 2023-01-01 RX ADMIN — CEFEPIME 1 G: 1 INJECTION, POWDER, FOR SOLUTION INTRAMUSCULAR; INTRAVENOUS at 09:02

## 2023-01-01 RX ADMIN — ACETAMINOPHEN 1000 MG: 500 TABLET ORAL at 09:02

## 2023-01-01 RX ADMIN — APIXABAN 2.5 MG: 2.5 TABLET, FILM COATED ORAL at 09:02

## 2023-01-01 RX ADMIN — LEVOTHYROXINE SODIUM 112 MCG: 112 TABLET ORAL at 06:01

## 2023-01-01 RX ADMIN — PHENYLEPHRINE HYDROCHLORIDE 200 MCG: 10 INJECTION INTRAVENOUS at 11:01

## 2023-01-01 RX ADMIN — MORPHINE SULFATE 2 MG: 2 INJECTION, SOLUTION INTRAMUSCULAR; INTRAVENOUS at 12:02

## 2023-01-01 RX ADMIN — MORPHINE SULFATE 2 MG: 2 INJECTION, SOLUTION INTRAMUSCULAR; INTRAVENOUS at 09:01

## 2023-01-01 RX ADMIN — SERTRALINE HYDROCHLORIDE 50 MG: 50 TABLET ORAL at 11:02

## 2023-01-01 RX ADMIN — PANTOPRAZOLE SODIUM 40 MG: 40 INJECTION, POWDER, FOR SOLUTION INTRAVENOUS at 10:01

## 2023-01-01 RX ADMIN — MICONAZOLE NITRATE: 20 POWDER TOPICAL at 09:02

## 2023-01-01 RX ADMIN — ISOSORBIDE MONONITRATE 60 MG: 60 TABLET, EXTENDED RELEASE ORAL at 10:02

## 2023-01-01 RX ADMIN — ACETAMINOPHEN 1000 MG: 500 TABLET ORAL at 07:02

## 2023-01-01 RX ADMIN — MEMANTINE 10 MG: 10 TABLET ORAL at 10:01

## 2023-01-01 RX ADMIN — LORAZEPAM 2 MG: 2 INJECTION INTRAMUSCULAR; INTRAVENOUS at 06:02

## 2023-01-01 RX ADMIN — MICONAZOLE NITRATE: 20 POWDER TOPICAL at 08:02

## 2023-01-01 RX ADMIN — MICONAZOLE NITRATE: 20 POWDER TOPICAL at 10:02

## 2023-01-01 RX ADMIN — DONEPEZIL HYDROCHLORIDE 10 MG: 5 TABLET, FILM COATED ORAL at 09:02

## 2023-01-01 RX ADMIN — APIXABAN 2.5 MG: 2.5 TABLET, FILM COATED ORAL at 10:02

## 2023-01-01 RX ADMIN — METOPROLOL TARTRATE 5 MG: 1 INJECTION, SOLUTION INTRAVENOUS at 12:02

## 2023-01-01 RX ADMIN — METOPROLOL TARTRATE 50 MG: 50 TABLET, FILM COATED ORAL at 09:02

## 2023-01-01 RX ADMIN — METOPROLOL TARTRATE 5 MG: 1 INJECTION, SOLUTION INTRAVENOUS at 07:02

## 2023-01-01 RX ADMIN — METRONIDAZOLE 500 MG: 5 INJECTION, SOLUTION INTRAVENOUS at 07:01

## 2023-01-01 RX ADMIN — GENTAMICIN SULFATE 80 MG: 80 INJECTION, SOLUTION INTRAVENOUS at 10:01

## 2023-01-01 RX ADMIN — SERTRALINE HYDROCHLORIDE 50 MG: 50 TABLET, FILM COATED ORAL at 09:02

## 2023-01-01 RX ADMIN — MORPHINE SULFATE 2 MG: 2 INJECTION, SOLUTION INTRAMUSCULAR; INTRAVENOUS at 08:02

## 2023-01-01 RX ADMIN — AMIODARONE HYDROCHLORIDE 150 MG: 1.5 INJECTION, SOLUTION INTRAVENOUS at 06:02

## 2023-01-01 RX ADMIN — LEVOTHYROXINE SODIUM 112 MCG: 0.11 TABLET ORAL at 05:02

## 2023-01-01 RX ADMIN — MEMANTINE 10 MG: 10 TABLET ORAL at 09:01

## 2023-01-01 RX ADMIN — SERTRALINE HYDROCHLORIDE 50 MG: 50 TABLET, FILM COATED ORAL at 10:02

## 2023-01-01 RX ADMIN — MUPIROCIN: 20 OINTMENT TOPICAL at 08:02

## 2023-01-01 RX ADMIN — CEFTRIAXONE SODIUM 1 G: 1 INJECTION, POWDER, FOR SOLUTION INTRAMUSCULAR; INTRAVENOUS at 10:01

## 2023-01-01 RX ADMIN — SODIUM CHLORIDE 500 ML: 9 INJECTION, SOLUTION INTRAVENOUS at 04:01

## 2023-01-01 RX ADMIN — LEVOTHYROXINE SODIUM 112 MCG: 112 TABLET ORAL at 05:02

## 2023-01-01 RX ADMIN — CEFEPIME 1 G: 1 INJECTION, POWDER, FOR SOLUTION INTRAMUSCULAR; INTRAVENOUS at 05:02

## 2023-01-01 RX ADMIN — SODIUM ZIRCONIUM CYCLOSILICATE 10 G: 10 POWDER, FOR SUSPENSION ORAL at 10:02

## 2023-01-01 RX ADMIN — SODIUM CHLORIDE 500 ML: 9 INJECTION, SOLUTION INTRAVENOUS at 05:01

## 2023-01-01 RX ADMIN — LEVOTHYROXINE SODIUM 112 MCG: 112 TABLET ORAL at 06:02

## 2023-01-01 RX ADMIN — MUPIROCIN: 20 OINTMENT TOPICAL at 11:02

## 2023-01-01 RX ADMIN — ROCURONIUM BROMIDE 30 MG: 10 INJECTION, SOLUTION INTRAVENOUS at 10:01

## 2023-01-01 RX ADMIN — LORAZEPAM 2 MG: 2 INJECTION INTRAMUSCULAR; INTRAVENOUS at 10:02

## 2023-01-01 RX ADMIN — AMIODARONE HYDROCHLORIDE 0.5 MG/MIN: 50 INJECTION, SOLUTION INTRAVENOUS at 12:02

## 2023-01-01 RX ADMIN — MEMANTINE 10 MG: 10 TABLET ORAL at 09:02

## 2023-01-01 RX ADMIN — APIXABAN 2.5 MG: 2.5 TABLET, FILM COATED ORAL at 08:02

## 2023-01-01 RX ADMIN — MORPHINE SULFATE 2 MG: 2 INJECTION, SOLUTION INTRAMUSCULAR; INTRAVENOUS at 07:02

## 2023-01-01 RX ADMIN — METRONIDAZOLE 500 MG: 5 INJECTION, SOLUTION INTRAVENOUS at 12:01

## 2023-01-01 RX ADMIN — TRAZODONE HYDROCHLORIDE 50 MG: 50 TABLET ORAL at 09:01

## 2023-01-01 RX ADMIN — CEFTRIAXONE SODIUM 1 G: 1 INJECTION, POWDER, FOR SOLUTION INTRAMUSCULAR; INTRAVENOUS at 11:01

## 2023-01-01 RX ADMIN — METOPROLOL TARTRATE 5 MG: 1 INJECTION, SOLUTION INTRAVENOUS at 09:02

## 2023-01-01 RX ADMIN — MORPHINE SULFATE 2 MG: 2 INJECTION, SOLUTION INTRAMUSCULAR; INTRAVENOUS at 09:02

## 2023-01-01 RX ADMIN — ISOSORBIDE MONONITRATE 60 MG: 60 TABLET, EXTENDED RELEASE ORAL at 11:02

## 2023-01-01 RX ADMIN — SODIUM CHLORIDE: 9 INJECTION, SOLUTION INTRAVENOUS at 10:01

## 2023-01-01 RX ADMIN — SODIUM CHLORIDE 75 ML/HR: 9 INJECTION, SOLUTION INTRAVENOUS at 03:01

## 2023-01-01 RX ADMIN — SODIUM CHLORIDE 75 ML/HR: 9 INJECTION, SOLUTION INTRAVENOUS at 04:01

## 2023-01-01 RX ADMIN — SODIUM CHLORIDE 200 ML: 9 INJECTION, SOLUTION INTRAVENOUS at 10:01

## 2023-01-01 RX ADMIN — MORPHINE SULFATE 2 MG: 2 INJECTION, SOLUTION INTRAMUSCULAR; INTRAVENOUS at 10:02

## 2023-01-01 RX ADMIN — PANTOPRAZOLE SODIUM 40 MG: 40 TABLET, DELAYED RELEASE ORAL at 08:01

## 2023-01-01 RX ADMIN — DILTIAZEM HYDROCHLORIDE 180 MG: 180 CAPSULE, COATED, EXTENDED RELEASE ORAL at 10:02

## 2023-01-01 RX ADMIN — SODIUM ZIRCONIUM CYCLOSILICATE 10 G: 10 POWDER, FOR SUSPENSION ORAL at 05:02

## 2023-01-01 RX ADMIN — AMIODARONE HYDROCHLORIDE 0.5 MG/MIN: 50 INJECTION, SOLUTION INTRAVENOUS at 06:02

## 2023-01-01 RX ADMIN — DONEPEZIL HYDROCHLORIDE 10 MG: 5 TABLET, FILM COATED ORAL at 09:01

## 2023-01-01 RX ADMIN — CEFEPIME 1 G: 1 INJECTION, POWDER, FOR SOLUTION INTRAMUSCULAR; INTRAVENOUS at 06:02

## 2023-01-01 RX ADMIN — APIXABAN 2.5 MG: 2.5 TABLET, FILM COATED ORAL at 11:02

## 2023-01-01 RX ADMIN — OXYCODONE HYDROCHLORIDE 5 MG: 5 TABLET ORAL at 11:01

## 2023-01-01 RX ADMIN — METOPROLOL TARTRATE 50 MG: 50 TABLET, FILM COATED ORAL at 09:01

## 2023-01-01 RX ADMIN — LORAZEPAM 2 MG: 2 INJECTION INTRAMUSCULAR; INTRAVENOUS at 02:02

## 2023-01-01 RX ADMIN — MEMANTINE 10 MG: 10 TABLET ORAL at 08:01

## 2023-01-01 RX ADMIN — DONEPEZIL HYDROCHLORIDE 10 MG: 5 TABLET, FILM COATED ORAL at 10:01

## 2023-01-01 RX ADMIN — PHENYLEPHRINE HYDROCHLORIDE 100 MCG: 10 INJECTION INTRAVENOUS at 10:01

## 2023-01-01 RX ADMIN — AMIODARONE HYDROCHLORIDE 1 MG/MIN: 1.8 INJECTION, SOLUTION INTRAVENOUS at 06:02

## 2023-01-01 RX ADMIN — SODIUM CHLORIDE 75 ML/HR: 9 INJECTION, SOLUTION INTRAVENOUS at 06:01

## 2023-01-01 RX ADMIN — METOPROLOL TARTRATE 50 MG: 50 TABLET, FILM COATED ORAL at 10:01

## 2023-01-01 RX ADMIN — SERTRALINE HYDROCHLORIDE 50 MG: 50 TABLET ORAL at 10:02

## 2023-01-01 RX ADMIN — METOPROLOL TARTRATE 50 MG: 50 TABLET, FILM COATED ORAL at 10:02

## 2023-01-01 RX ADMIN — LORAZEPAM 2 MG: 2 INJECTION INTRAMUSCULAR; INTRAVENOUS at 07:02

## 2023-01-01 RX ADMIN — ISOSORBIDE MONONITRATE 60 MG: 60 TABLET, EXTENDED RELEASE ORAL at 09:02

## 2023-01-01 RX ADMIN — CEFTRIAXONE SODIUM 1 G: 1 INJECTION, POWDER, FOR SOLUTION INTRAMUSCULAR; INTRAVENOUS at 12:02

## 2023-01-01 RX ADMIN — MORPHINE SULFATE 2 MG: 2 INJECTION, SOLUTION INTRAMUSCULAR; INTRAVENOUS at 02:02

## 2023-01-01 RX ADMIN — MORPHINE SULFATE 2 MG: 2 INJECTION, SOLUTION INTRAMUSCULAR; INTRAVENOUS at 02:01

## 2023-01-01 RX ADMIN — SODIUM CHLORIDE, POTASSIUM CHLORIDE, SODIUM LACTATE AND CALCIUM CHLORIDE 125 ML/HR: 600; 310; 30; 20 INJECTION, SOLUTION INTRAVENOUS at 12:01

## 2023-01-01 RX ADMIN — OXYCODONE HYDROCHLORIDE 5 MG: 5 TABLET ORAL at 06:01

## 2023-01-01 RX ADMIN — PANTOPRAZOLE SODIUM 40 MG: 40 INJECTION, POWDER, LYOPHILIZED, FOR SOLUTION INTRAVENOUS at 09:02

## 2023-01-01 RX ADMIN — METOPROLOL TARTRATE 50 MG: 50 TABLET, FILM COATED ORAL at 08:01

## 2023-01-01 RX ADMIN — PHENYLEPHRINE HYDROCHLORIDE 300 MCG: 10 INJECTION INTRAVENOUS at 10:01

## 2023-01-01 RX ADMIN — CEFTRIAXONE SODIUM 1 G: 1 INJECTION, POWDER, FOR SOLUTION INTRAMUSCULAR; INTRAVENOUS at 10:02

## 2023-01-01 RX ADMIN — CEFTRIAXONE SODIUM 1 G: 1 INJECTION, POWDER, FOR SOLUTION INTRAMUSCULAR; INTRAVENOUS at 12:01

## 2023-01-01 RX ADMIN — POTASSIUM CHLORIDE, DEXTROSE MONOHYDRATE AND SODIUM CHLORIDE: 150; 5; 450 INJECTION, SOLUTION INTRAVENOUS at 04:01

## 2023-01-01 RX ADMIN — MEMANTINE 10 MG: 5 TABLET ORAL at 08:02

## 2023-01-01 RX ADMIN — MUPIROCIN: 20 OINTMENT TOPICAL at 09:02

## 2023-01-01 RX ADMIN — MEMANTINE 10 MG: 5 TABLET ORAL at 09:02

## 2023-01-01 RX ADMIN — LOSARTAN POTASSIUM 100 MG: 100 TABLET, FILM COATED ORAL at 08:01

## 2023-01-01 RX ADMIN — LORAZEPAM 2 MG: 2 INJECTION INTRAMUSCULAR; INTRAVENOUS at 12:02

## 2023-01-01 RX ADMIN — MORPHINE SULFATE 2 MG: 2 INJECTION, SOLUTION INTRAMUSCULAR; INTRAVENOUS at 04:02

## 2023-01-01 RX ADMIN — PANTOPRAZOLE SODIUM 40 MG: 40 INJECTION, POWDER, FOR SOLUTION INTRAVENOUS at 09:02

## 2023-01-01 RX ADMIN — PROPOFOL 100 MG: 10 INJECTION, EMULSION INTRAVENOUS at 10:01

## 2023-01-01 RX ADMIN — GLYCOPYRROLATE 0.2 MG: 0.2 INJECTION INTRAMUSCULAR; INTRAVENOUS at 11:01

## 2023-01-01 RX ADMIN — SODIUM CHLORIDE: 9 INJECTION, SOLUTION INTRAVENOUS at 11:01

## 2023-01-01 RX ADMIN — SODIUM ZIRCONIUM CYCLOSILICATE 10 G: 10 POWDER, FOR SUSPENSION ORAL at 09:02

## 2023-01-01 RX ADMIN — APIXABAN 2.5 MG: 2.5 TABLET, FILM COATED ORAL at 09:01

## 2023-01-01 RX ADMIN — SODIUM ZIRCONIUM CYCLOSILICATE 10 G: 10 POWDER, FOR SUSPENSION ORAL at 08:02

## 2023-01-01 RX ADMIN — METRONIDAZOLE 500 MG: 5 INJECTION, SOLUTION INTRAVENOUS at 02:01

## 2023-01-01 RX ADMIN — PANTOPRAZOLE SODIUM 40 MG: 40 INJECTION, POWDER, FOR SOLUTION INTRAVENOUS at 11:02

## 2023-01-01 RX ADMIN — SCOPOLAMINE 1 PATCH: 1 SYSTEM TRANSDERMAL at 02:02

## 2023-01-01 RX ADMIN — FAMOTIDINE 20 MG: 20 TABLET, FILM COATED ORAL at 10:02

## 2023-01-01 RX ADMIN — CEFEPIME 1 G: 1 INJECTION, POWDER, FOR SOLUTION INTRAMUSCULAR; INTRAVENOUS at 02:02

## 2023-01-01 RX ADMIN — CLINDAMYCIN PHOSPHATE 900 MG: 900 INJECTION, SOLUTION INTRAVENOUS at 02:01

## 2023-01-01 RX ADMIN — IBUPROFEN 400 MG: 400 TABLET ORAL at 09:02

## 2023-01-01 RX ADMIN — ONDANSETRON 4 MG: 2 INJECTION INTRAMUSCULAR; INTRAVENOUS at 11:01

## 2023-01-01 RX ADMIN — ACETAMINOPHEN 1000 MG: 500 TABLET ORAL at 09:01

## 2023-01-01 RX ADMIN — DONEPEZIL HYDROCHLORIDE 10 MG: 5 TABLET, FILM COATED ORAL at 08:02

## 2023-01-01 RX ADMIN — ISOSORBIDE MONONITRATE 60 MG: 60 TABLET, EXTENDED RELEASE ORAL at 08:01

## 2023-01-01 RX ADMIN — ACETAMINOPHEN 1000 MG: 500 TABLET ORAL at 06:02

## 2023-01-01 RX ADMIN — METOPROLOL TARTRATE 5 MG: 1 INJECTION, SOLUTION INTRAVENOUS at 05:02

## 2023-01-01 RX ADMIN — METRONIDAZOLE 500 MG: 5 INJECTION, SOLUTION INTRAVENOUS at 11:01

## 2023-01-01 RX ADMIN — LORAZEPAM 2 MG: 2 INJECTION INTRAMUSCULAR; INTRAVENOUS at 11:02

## 2023-01-01 RX ADMIN — SUGAMMADEX 200 MG: 100 INJECTION, SOLUTION INTRAVENOUS at 11:01

## 2023-01-01 RX ADMIN — CLINDAMYCIN IN 5 PERCENT DEXTROSE 900 MG: 18 INJECTION, SOLUTION INTRAVENOUS at 10:01

## 2023-01-01 RX ADMIN — MEMANTINE 10 MG: 5 TABLET ORAL at 10:02

## 2023-01-01 RX ADMIN — METRONIDAZOLE 500 MG: 5 INJECTION, SOLUTION INTRAVENOUS at 09:01

## 2023-01-01 RX ADMIN — MUPIROCIN: 20 OINTMENT TOPICAL at 10:02

## 2023-01-01 RX ADMIN — SERTRALINE HYDROCHLORIDE 50 MG: 50 TABLET ORAL at 08:01

## 2023-01-01 RX ADMIN — MORPHINE SULFATE 2 MG: 2 INJECTION, SOLUTION INTRAMUSCULAR; INTRAVENOUS at 03:02

## 2023-01-01 RX ADMIN — CLINDAMYCIN PHOSPHATE 900 MG: 900 INJECTION, SOLUTION INTRAVENOUS at 05:01

## 2023-01-01 RX ADMIN — SODIUM CHLORIDE 75 ML/HR: 9 INJECTION, SOLUTION INTRAVENOUS at 09:01

## 2023-01-01 RX ADMIN — SODIUM ZIRCONIUM CYCLOSILICATE 10 G: 10 POWDER, FOR SUSPENSION ORAL at 03:02

## 2023-01-01 RX ADMIN — DILTIAZEM HYDROCHLORIDE 180 MG: 180 CAPSULE, COATED, EXTENDED RELEASE ORAL at 08:01

## 2023-01-01 RX ADMIN — LIDOCAINE HYDROCHLORIDE 50 MG: 20 INJECTION, SOLUTION EPIDURAL; INFILTRATION; INTRACAUDAL; PERINEURAL at 10:01

## 2023-01-01 RX ADMIN — LORAZEPAM 2 MG: 2 INJECTION INTRAMUSCULAR; INTRAVENOUS at 05:02

## 2023-01-01 RX ADMIN — FENTANYL TRANSDERMAL 1 PATCH: 12.5 PATCH, EXTENDED RELEASE TRANSDERMAL at 05:02

## 2023-01-01 RX ADMIN — ENOXAPARIN SODIUM 40 MG: 100 INJECTION SUBCUTANEOUS at 06:01

## 2023-01-01 RX ADMIN — PANTOPRAZOLE SODIUM 40 MG: 40 INJECTION, POWDER, LYOPHILIZED, FOR SOLUTION INTRAVENOUS at 10:02

## 2023-01-01 RX ADMIN — MEMANTINE 10 MG: 10 TABLET ORAL at 11:02

## 2023-01-01 RX ADMIN — PANTOPRAZOLE SODIUM 40 MG: 40 INJECTION, POWDER, FOR SOLUTION INTRAVENOUS at 10:02

## 2023-01-01 RX ADMIN — FAMOTIDINE 20 MG: 20 TABLET, FILM COATED ORAL at 09:02

## 2023-01-01 RX ADMIN — ORPHENADRINE CITRATE 60 MG: 30 INJECTION INTRAMUSCULAR; INTRAVENOUS at 10:01

## 2023-01-27 PROBLEM — Z01.811 PRE-OP CHEST EXAM: Status: RESOLVED | Noted: 2023-01-01 | Resolved: 2023-01-01

## 2023-01-27 PROBLEM — S72.142A INTERTROCHANTERIC FRACTURE OF LEFT FEMUR: Status: ACTIVE | Noted: 2023-01-01

## 2023-01-27 PROBLEM — Z01.811 PRE-OP CHEST EXAM: Status: ACTIVE | Noted: 2023-01-01
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
## 2023-01-27 NOTE — SUBJECTIVE & OBJECTIVE
Past Medical History:   Diagnosis Date    Alzheimer's dementia with anxiety     Anticoagulant long-term use     GERD (gastroesophageal reflux disease)     Hypertension     Mixed hyperlipidemia     Paroxysmal atrial fibrillation     Thyroid disease        Past Surgical History:   Procedure Laterality Date    APPENDECTOMY         Review of patient's allergies indicates:   Allergen Reactions    Cipro [ciprofloxacin hcl]     Pcn [penicillins] Hives and Itching       Current Facility-Administered Medications   Medication    0.9%  NaCl infusion    acetaminophen tablet 1,000 mg    bisacodyL EC tablet 10 mg    cefTRIAXone (ROCEPHIN) 1 g in dextrose 5 % in water (D5W) 5 % 50 mL IVPB (MB+)    dextromethorphan-guaiFENesin  mg/5 ml liquid 10 mL    diazePAM tablet 2 mg    diltiaZEM 24 hr capsule 180 mg    donepeziL tablet 10 mg    isosorbide mononitrate 24 hr tablet 60 mg    levothyroxine tablet 112 mcg    losartan tablet 100 mg    memantine tablet 10 mg    metoprolol tartrate (LOPRESSOR) tablet 50 mg    morphine injection 2 mg    ondansetron injection 8 mg    oxyCODONE immediate release tablet 5 mg    pantoprazole EC tablet 40 mg    sertraline tablet 50 mg    simethicone chewable tablet 80 mg    traZODone tablet 50 mg     Family History       Problem Relation (Age of Onset)    No Known Problems Mother, Father          Tobacco Use    Smoking status: Never    Smokeless tobacco: Never   Substance and Sexual Activity    Alcohol use: Never    Drug use: Never    Sexual activity: Not Currently     Review of Systems   Musculoskeletal:  Positive for joint pain.   Objective:     Vital Signs (Most Recent):  Temp: 97.8 °F (36.6 °C) (01/27/23 0636)  Pulse: 69 (01/27/23 0636)  Resp: 18 (01/27/23 0653)  BP: 131/66 (01/27/23 0636)  SpO2: (!) 94 % (01/27/23 0636)   Vital Signs (24h Range):  Temp:  [97.1 °F (36.2 °C)-98.8 °F (37.1 °C)] 97.8 °F (36.6 °C)  Pulse:  [61-78] 69  Resp:  [12-20] 18  SpO2:  [71 %-100 %] 94 %  BP: (121-147)/(52-79)  "131/66     Weight: 52.2 kg (115 lb)  Height: 5' 2" (157.5 cm)  Body mass index is 21.03 kg/m².    No intake or output data in the 24 hours ending 01/27/23 0918            Left Hip Exam     Inspection   Deformity of hip.    Tenderness   The patient tender to palpation of the trochanteric bursa.    Other   Sensation: normal          Vascular Exam       Left Pulses  Dorsalis Pedis:      2+          Significant Labs: All pertinent labs within the past 24 hours have been reviewed.    Significant Imaging: I have reviewed all pertinent imaging results/findings.  "

## 2023-01-27 NOTE — HOSPITAL COURSE
"1/27-no complaints today. NPO for OR  1/28- the patient underwent repair of left femur fracture yesterday, which went well.  She seems to have an acute mental status change this a.m. being very hard to arouse and elicit a response.  An RRT was called on the patient's a.m..  Chest x-ray shows left lower lobe infiltrate possible pneumonia.  The patient had sedation for surgery as well as trazodone last night.  Possible lingering affects.  Added metronidazole with ceftriaxone for possible aspiration pneumonia.  Patient has a penicillin allergy.  1/29- patient was seen examined today resting comfortably in bed, in no acute distress.  She continues to be very somnolent.  Family states she still has not woken up very much and has not been awake enough to eat or drink over the past 24 hours.  However, she is more arousable today and will open her eyes on command and will murmur when spoken to.  This is an improvement over yesterday's exam.  Ammonia level was negative yesterday.  Patient on appropriate antibiotics for aspiration pneumonia with a penicillin allergy.  Today, we will change her fluids and increase the rate slightly.  Patient is slowly improving.   1/30-CTH today for AMS.  Suspect this is related to medication.  MAYTE as well.  Will increase IVF as she is not eating well.  If she does not awaken soon will need NGT for enteral feeding.  1/31-more awake today, still not eating. Will place NGT  2/1-continues to be more awake and family says she is now asking for water.  Can pull NGT once awake enough to eat.  I believe this is related to medication stacking with anesthesia with her underlying dementia.  Improving.     02/05--"Changed to cefempime overnight d/t continued fevers, fever curve has now gone down  Pt worsening clinically , family aware, plans for hospice ultimately, wainting on a brother who is traveling from another city."    02/06--Pt accepted to [facility] for continued IV ABT, TF via NG and continued " monitoring, family aware of pt status and are discussing hospice.

## 2023-01-27 NOTE — ED NOTES
Dr. Camarillo at bedside and assessing pt and speaking with daughter.  MD aware that Dr. Nichols was ordering norflex IV infusion to be given for pain at this time.  She states pt already had a bed upstairs and that traction could be started once she arrived.  She also stated she would see pt once she got to 4N again and reassess pain.

## 2023-01-27 NOTE — SUBJECTIVE & OBJECTIVE
Past Medical History:   Diagnosis Date    Alzheimer's dementia with anxiety     Anticoagulant long-term use     GERD (gastroesophageal reflux disease)     Hypertension     Mixed hyperlipidemia     Paroxysmal atrial fibrillation     Thyroid disease        Past Surgical History:   Procedure Laterality Date    APPENDECTOMY         Review of patient's allergies indicates:   Allergen Reactions    Cipro [ciprofloxacin hcl]     Pcn [penicillins] Hives and Itching       No current facility-administered medications on file prior to encounter.     Current Outpatient Medications on File Prior to Encounter   Medication Sig    azithromycin (Z-MONSERRAT) 250 MG tablet Take 1 tablet (250 mg total) by mouth once daily.    cephALEXin (KEFLEX) 500 MG capsule Take 1 capsule (500 mg total) by mouth every 12 (twelve) hours.    diazePAM (VALIUM) 2 MG tablet TAKE 1 TABLET BY MOUTH EACH NIGHT AT BEDTIME ..MAY CAUSE DROWSINESS.. NO ALCOHOL    diltiaZEM (CARDIZEM CD) 180 MG 24 hr capsule Take 1 capsule (180 mg total) by mouth once daily.    donepeziL (ARICEPT) 10 MG tablet Take 1 tablet (10 mg total) by mouth every evening.    ELIQUIS 2.5 mg Tab Take 1 tablet (2.5 mg total) by mouth 2 (two) times daily.    ezetimibe-simvastatin 10-20 mg (VYTORIN) 10-20 mg per tablet TAKE 1 TABLET BY MOUTH DAILY ..DO NOT EAT GRAPEFRUIT OR DRINK GRAPEFRUIT JUICE WHILE TAKING..    furosemide (LASIX) 40 MG tablet Take 1 tablet (40 mg total) by mouth once daily.    isosorbide mononitrate (IMDUR) 60 MG 24 hr tablet Take 1 tablet (60 mg total) by mouth once daily.    levothyroxine (SYNTHROID) 100 MCG tablet TAKE 1 TABLET BY MOUTH DAILY ON EMPTY STOMACH    levothyroxine (SYNTHROID) 112 MCG tablet Take 1 tablet (112 mcg total) by mouth before breakfast.    memantine (NAMENDA XR) 28 mg CSpX Take 1 capsule (28 mg total) by mouth once daily.    NAMZARIC 28-10 mg CSpX Take 1 capsule by mouth every evening.    nebivoloL (BYSTOLIC) 5 MG Tab Take 1 tablet (5 mg total) by mouth  every morning.    olmesartan (BENICAR) 20 MG tablet Take 1 tablet (20 mg total) by mouth once daily.    pantoprazole (PROTONIX) 40 MG tablet TAKE 1 TABLET BY MOUTH DAILY 30 MINUTES BEFORE A MEAL    potassium chloride SA (K-DUR,KLOR-CON M) 10 MEQ tablet Take 1 tablet (10 mEq total) by mouth once daily.    sertraline (ZOLOFT) 50 MG tablet Take 1 tablet (50 mg total) by mouth once daily.     Family History       Problem Relation (Age of Onset)    No Known Problems Mother, Father          Tobacco Use    Smoking status: Never    Smokeless tobacco: Never   Substance and Sexual Activity    Alcohol use: Never    Drug use: Never    Sexual activity: Not Currently     Review of Systems   Unable to perform ROS: Dementia   Objective:     Vital Signs (Most Recent):  Temp: 98 °F (36.7 °C) (01/27/23 0058)  Pulse: 72 (01/27/23 0058)  Resp: 18 (01/27/23 0058)  BP: 138/71 (01/27/23 0058)  SpO2: 97 % (01/27/23 0058)   Vital Signs (24h Range):  Temp:  [97.2 °F (36.2 °C)-98.8 °F (37.1 °C)] 98 °F (36.7 °C)  Pulse:  [61-78] 72  Resp:  [13-20] 18  SpO2:  [71 %-100 %] 97 %  BP: (121-145)/(52-71) 138/71     Weight: 52.2 kg (115 lb)  Body mass index is 21.03 kg/m².    Physical Exam  Constitutional:       General: She is in acute distress.      Appearance: She is not ill-appearing or toxic-appearing.   HENT:      Head: Atraumatic.      Mouth/Throat:      Mouth: Mucous membranes are moist.      Pharynx: Oropharynx is clear.   Eyes:      Conjunctiva/sclera: Conjunctivae normal.      Pupils: Pupils are equal, round, and reactive to light.   Neck:      Vascular: No carotid bruit.   Cardiovascular:      Rate and Rhythm: Normal rate. Rhythm irregular.      Pulses: Normal pulses.      Heart sounds: Normal heart sounds.   Pulmonary:      Effort: Pulmonary effort is normal.      Breath sounds: Normal breath sounds.   Abdominal:      General: Abdomen is flat. Bowel sounds are normal. There is no distension.      Palpations: Abdomen is soft.       Tenderness: There is no abdominal tenderness. There is no guarding.   Musculoskeletal:         General: Deformity and signs of injury present. Normal range of motion.      Cervical back: Neck supple.      Right lower leg: No edema.      Left lower leg: No edema.   Skin:     General: Skin is warm and dry.      Capillary Refill: Capillary refill takes 2 to 3 seconds.      Coloration: Skin is not jaundiced or pale.      Findings: No bruising, lesion or rash.   Neurological:      General: No focal deficit present.      Mental Status: She is alert and oriented to person, place, and time.   Psychiatric:         Mood and Affect: Mood normal.         CRANIAL NERVES     CN III, IV, VI   Pupils are equal, round, and reactive to light.     Significant Labs: All pertinent labs within the past 24 hours have been reviewed.    Significant Imaging: I have reviewed all pertinent imaging results/findings within the past 24 hours.

## 2023-01-27 NOTE — ASSESSMENT & PLAN NOTE
Patient is on eliquis and recommend at least 24h off before surgery.    I see no medical reason why patient cannot proceed to surgery.     Per AHA RCRI she has a less than 2.5% change of perioperative cardiac risk.    High risk is greater than 1%. Patient is not in pulmonary edema or with unstable angina or valvular hear disease.   Agree with pain control and rehab. Will place 5 lbs Worrell's traction.    Will place escobar and check UA and treat any bacteruria.  Will treat if clinically indicated.

## 2023-01-27 NOTE — ASSESSMENT & PLAN NOTE
Patient with Long standing persistent (>12 months) atrial fibrillation which is controlled currently with Beta Blocker. Patient is currently in atrial fibrillation.[id number] Score: 3. HASBLED Score: 1. Anticoagulation indicated. Anticoagulation done with eliquis     Continue BB and hold anticoagulation due to pending procedure.  .

## 2023-01-27 NOTE — TRANSFER OF CARE
"Anesthesia Transfer of Care Note    Patient: Kymberly Franco    Procedure(s) Performed: Procedure(s) (LRB):  INSERTION, INTRAMEDULLARY THANG, FEMUR, TROCHANTER (Left)    Patient location: PACU    Anesthesia Type: general    Transport from OR: Transported from OR on 6-10 L/min O2 by face mask with adequate spontaneous ventilation    Post pain: adequate analgesia    Post assessment: no apparent anesthetic complications    Post vital signs: stable    Level of consciousness: responds to stimulation    Nausea/Vomiting: no nausea/vomiting    Complications: none    Transfer of care protocol was followed      Last vitals:   Visit Vitals  BP (!) 99/51   Pulse 76   Temp 36.7 °C (98 °F)   Resp 15   Ht 5' 2" (1.575 m)   Wt 52.2 kg (115 lb)   SpO2 100%   Breastfeeding No   BMI 21.03 kg/m²     "

## 2023-01-27 NOTE — H&P
[facility]  History & Physical    Patient Name: Kymberly Franco  MRN: [id number]  Patient Class: IP- Inpatient  Admission Date: 1/26/2023  Attending Physician: Juanito Huggins DO   Primary Care Provider: Andry Galarza MD         Patient information was obtained from patient, relative(s), past medical records and ER records.     Subjective:     Principal Problem:Intertrochanteric fracture of left femur    Chief Complaint:   Chief Complaint   Patient presents with    Hip Pain      Notified of UA results after admission.  UTI noted and rocephin  started preoperatively.      HPI: 89 yo F presents to the ED after an accidental fall at home.  She hit her head and has LLE pain.  She is on eliquis for CAF and head CT negative for hemorrhage but xray of left hip and pelvis showed an intertrochanteric fracture.  Dr. Cheung has been following her after a fall and suffering a right humeral  fracture.  Her daughter, Stephanie Ramos who works in [employer] here at [facility], says that she has a very unsteady gait and fall frequently.  She did not have focal weakness or episode of syncope.      Patient is very [place] and has some mild dementia.  Her daughter does not report any complaints of CP, SOB, palpitations or N/V/D.      Past Medical History:   Diagnosis Date    Alzheimer's dementia with anxiety     Anticoagulant long-term use     GERD (gastroesophageal reflux disease)     Hypertension     Mixed hyperlipidemia     Paroxysmal atrial fibrillation     Thyroid disease        Past Surgical History:   Procedure Laterality Date    APPENDECTOMY         Review of patient's allergies indicates:   Allergen Reactions    Cipro [ciprofloxacin hcl]     Pcn [penicillins] Hives and Itching       No current facility-administered medications on file prior to encounter.     Current Outpatient Medications on File Prior to Encounter   Medication Sig    azithromycin (Z-MONSERRAT) 250 MG tablet Take 1 tablet (250 mg  total) by mouth once daily.    cephALEXin (KEFLEX) 500 MG capsule Take 1 capsule (500 mg total) by mouth every 12 (twelve) hours.    diazePAM (VALIUM) 2 MG tablet TAKE 1 TABLET BY MOUTH EACH NIGHT AT BEDTIME ..MAY CAUSE DROWSINESS.. NO ALCOHOL    diltiaZEM (CARDIZEM CD) 180 MG 24 hr capsule Take 1 capsule (180 mg total) by mouth once daily.    donepeziL (ARICEPT) 10 MG tablet Take 1 tablet (10 mg total) by mouth every evening.    ELIQUIS 2.5 mg Tab Take 1 tablet (2.5 mg total) by mouth 2 (two) times daily.    ezetimibe-simvastatin 10-20 mg (VYTORIN) 10-20 mg per tablet TAKE 1 TABLET BY MOUTH DAILY ..DO NOT EAT GRAPEFRUIT OR DRINK GRAPEFRUIT JUICE WHILE TAKING..    furosemide (LASIX) 40 MG tablet Take 1 tablet (40 mg total) by mouth once daily.    isosorbide mononitrate (IMDUR) 60 MG 24 hr tablet Take 1 tablet (60 mg total) by mouth once daily.    levothyroxine (SYNTHROID) 100 MCG tablet TAKE 1 TABLET BY MOUTH DAILY ON EMPTY STOMACH    levothyroxine (SYNTHROID) 112 MCG tablet Take 1 tablet (112 mcg total) by mouth before breakfast.    memantine (NAMENDA XR) 28 mg CSpX Take 1 capsule (28 mg total) by mouth once daily.    NAMZARIC 28-10 mg CSpX Take 1 capsule by mouth every evening.    nebivoloL (BYSTOLIC) 5 MG Tab Take 1 tablet (5 mg total) by mouth every morning.    olmesartan (BENICAR) 20 MG tablet Take 1 tablet (20 mg total) by mouth once daily.    pantoprazole (PROTONIX) 40 MG tablet TAKE 1 TABLET BY MOUTH DAILY 30 MINUTES BEFORE A MEAL    potassium chloride SA (K-DUR,KLOR-CON M) 10 MEQ tablet Take 1 tablet (10 mEq total) by mouth once daily.    sertraline (ZOLOFT) 50 MG tablet Take 1 tablet (50 mg total) by mouth once daily.     Family History       Problem Relation (Age of Onset)    No Known Problems Mother, Father          Tobacco Use    Smoking status: Never    Smokeless tobacco: Never   Substance and Sexual Activity    Alcohol use: Never    Drug use: Never    Sexual activity: Not Currently     Review of  Systems   Unable to perform ROS: Dementia   Objective:     Vital Signs (Most Recent):  Temp: 98 °F (36.7 °C) (01/27/23 0058)  Pulse: 72 (01/27/23 0058)  Resp: 18 (01/27/23 0058)  BP: 138/71 (01/27/23 0058)  SpO2: 97 % (01/27/23 0058)   Vital Signs (24h Range):  Temp:  [97.2 °F (36.2 °C)-98.8 °F (37.1 °C)] 98 °F (36.7 °C)  Pulse:  [61-78] 72  Resp:  [13-20] 18  SpO2:  [71 %-100 %] 97 %  BP: (121-145)/(52-71) 138/71     Weight: 52.2 kg (115 lb)  Body mass index is 21.03 kg/m².    Physical Exam  Constitutional:       General: She is in acute distress.      Appearance: She is not ill-appearing or toxic-appearing.   HENT:      Head: Atraumatic.      Mouth/Throat:      Mouth: Mucous membranes are moist.      Pharynx: Oropharynx is clear.   Eyes:      Conjunctiva/sclera: Conjunctivae normal.      Pupils: Pupils are equal, round, and reactive to light.   Neck:      Vascular: No carotid bruit.   Cardiovascular:      Rate and Rhythm: Normal rate. Rhythm irregular.      Pulses: Normal pulses.      Heart sounds: Normal heart sounds.   Pulmonary:      Effort: Pulmonary effort is normal.      Breath sounds: Normal breath sounds.   Abdominal:      General: Abdomen is flat. Bowel sounds are normal. There is no distension.      Palpations: Abdomen is soft.      Tenderness: There is no abdominal tenderness. There is no guarding.   Musculoskeletal:         General: Deformity and signs of injury present. Normal range of motion.      Cervical back: Neck supple.      Right lower leg: No edema.      Left lower leg: No edema.   Skin:     General: Skin is warm and dry.      Capillary Refill: Capillary refill takes 2 to 3 seconds.      Coloration: Skin is not jaundiced or pale.      Findings: No bruising, lesion or rash.   Neurological:      General: No focal deficit present.      Mental Status: She is alert and oriented to person, place, and time.   Psychiatric:         Mood and Affect: Mood normal.         CRANIAL NERVES     CN III, IV, VI    Pupils are equal, round, and reactive to light.     Significant Labs: All pertinent labs within the past 24 hours have been reviewed.    Significant Imaging: I have reviewed all pertinent imaging results/findings within the past 24 hours.    Assessment/Plan:     * Intertrochanteric fracture of left femur  Patient is on eliquis and recommend at least 24h off before surgery.    I see no medical reason why patient cannot proceed to surgery.     Per AHA RCRI she has a less than 2.5% change of perioperative cardiac risk.    High risk is greater than 1%. Patient is not in pulmonary edema or with unstable angina or valvular hear disease.   Agree with pain control and rehab. Will place 5 lbs Worrell's traction.    Will place escobar and check UA and treat any bacteruria.  Will treat if clinically indicated.        Alzheimer's dementia with anxiety  Continue home valium prn nightly  Continue aricept and namenda combination.       GERD (gastroesophageal reflux disease)  Continue PPI      Hypertension    Continue home antihypertensive agents    Mixed hyperlipidemia  Continue home statin.        Paroxysmal atrial fibrillation  Patient with Long standing persistent (>12 months) atrial fibrillation which is controlled currently with Beta Blocker. Patient is currently in atrial fibrillation.[id number] Score: 3. HASBLED Score: 1. Anticoagulation indicated. Anticoagulation done with eliquis     Continue BB and hold anticoagulation due to pending procedure.  .        Thyroid disease  Continue home synthroid.         VTE Risk Mitigation (From admission, onward)           Ordered     Place sequential compression device  Until discontinued         01/26/23 0625                       Angelica Camarillo MD  Department of Hospital Medicine   [facility]

## 2023-01-27 NOTE — ANESTHESIA POSTPROCEDURE EVALUATION
Anesthesia Post Evaluation    Patient: Kymberly Franco    Procedure(s) Performed: Procedure(s) (LRB):  INSERTION, INTRAMEDULLARY THANG, FEMUR, TROCHANTER (Left)    Final Anesthesia Type: general      Patient location during evaluation: PACU  Patient participation: Yes- Able to Participate  Level of consciousness: awake and sedated  Post-procedure vital signs: reviewed and stable  Pain management: adequate  Airway patency: patent    PONV status at discharge: No PONV  Anesthetic complications: no      Cardiovascular status: blood pressure returned to baseline  Respiratory status: unassisted  Hydration status: euvolemic  Follow-up not needed.          Vitals Value Taken Time   BP 99/51 01/27/23 1219   Temp 36.7 °C (98 °F) 01/27/23 1216   Pulse 76 01/27/23 1220   Resp 13 01/27/23 1220   SpO2 100 % 01/27/23 1220   Vitals shown include unvalidated device data.      No case tracking events are documented in the log.      Pain/Adrian Score: Pain Rating Prior to Med Admin: 7 (1/27/2023  9:51 AM)  Pain Rating Post Med Admin: 2 (1/27/2023  7:37 AM)

## 2023-01-27 NOTE — PROGRESS NOTES
[facility]  [facility] Medicine  Progress Note    Patient Name: Kymberly Franco  MRN: [id number]  Patient Class: IP- Inpatient   Admission Date: 1/26/2023  Length of Stay: 1 days  Attending Physician: Juanito Huggins DO  Primary Care Provider: Andry Galarza MD        Subjective:     Principal Problem:Intertrochanteric fracture of left femur        HPI:  89 yo F presents to the ED after an accidental fall at home.  She hit her head and has LLE pain.  She is on eliquis for CAF and head CT negative for hemorrhage but xray of left hip and pelvis showed an intertrochanteric fracture.  Dr. Cheung has been following her after a fall and suffering a right humeral  fracture.  Her daughter, Stephanie Ramos who works in [employer] here at [facility], says that she has a very unsteady gait and fall frequently.  She did not have focal weakness or episode of syncope.      Patient is very [place] and has some mild dementia.  Her daughter does not report any complaints of CP, SOB, palpitations or N/V/D.      Overview/Hospital Course:  1/27-no complaints today. NPO for OR      Interval History: naeo    Review of Systems   Reason unable to perform ROS: dementia.   Objective:     Vital Signs (Most Recent):  Temp: 97.8 °F (36.6 °C) (01/27/23 0636)  Pulse: 69 (01/27/23 0636)  Resp: 17 (01/27/23 0951)  BP: 131/66 (01/27/23 0636)  SpO2: (!) 94 % (01/27/23 0636)   Vital Signs (24h Range):  Temp:  [97.1 °F (36.2 °C)-98.8 °F (37.1 °C)] 97.8 °F (36.6 °C)  Pulse:  [61-78] 69  Resp:  [12-20] 17  SpO2:  [71 %-100 %] 94 %  BP: (121-147)/(52-79) 131/66     Weight: 52.2 kg (115 lb)  Body mass index is 21.03 kg/m².  No intake or output data in the 24 hours ending 01/27/23 1040   Physical Exam  Vitals reviewed.   Constitutional:       Appearance: Normal appearance.   HENT:      Head: Normocephalic and atraumatic.   Eyes:      Extraocular Movements: Extraocular movements intact.   Cardiovascular:      Rate and Rhythm: Normal rate and  regular rhythm.      Pulses: Normal pulses.   Pulmonary:      Effort: Pulmonary effort is normal.      Breath sounds: Normal breath sounds.   Abdominal:      General: Abdomen is flat.      Palpations: Abdomen is soft.   Musculoskeletal:         General: Tenderness present.   Skin:     General: Skin is warm and dry.      Capillary Refill: Capillary refill takes less than 2 seconds.   Neurological:      General: No focal deficit present.      Mental Status: She is alert.   Psychiatric:         Mood and Affect: Mood normal.         Behavior: Behavior normal.       Significant Labs: All pertinent labs within the past 24 hours have been reviewed.    Significant Imaging: I have reviewed all pertinent imaging results/findings within the past 24 hours.      Assessment/Plan:      * Intertrochanteric fracture of left femur  Patient is on eliquis and recommend at least 24h off before surgery.    I see no medical reason why patient cannot proceed to surgery.     Per AHA RCRI she has a less than 2.5% change of perioperative cardiac risk.    High risk is greater than 1%. Patient is not in pulmonary edema or with unstable angina or valvular hear disease.   Agree with pain control and rehab. Will place 5 lbs Worrell's traction.    Will place escobar and check UA and treat any bacteruria.  Will treat if clinically indicated.        Alzheimer's dementia with anxiety  Continue home valium prn nightly  Continue aricept and namenda combination.       GERD (gastroesophageal reflux disease)  Continue PPI      Hypertension    Continue home antihypertensive agents    Mixed hyperlipidemia  Continue home statin.        Paroxysmal atrial fibrillation  Patient with Long standing persistent (>12 months) atrial fibrillation which is controlled currently with Beta Blocker. Patient is currently in atrial fibrillation.COSTR1DLPs Score: 3. HASBLED Score: 1. Anticoagulation indicated. Anticoagulation done with eliquis     Continue BB and hold  anticoagulation due to pending procedure.  .        Thyroid disease  Continue home synthroid.         VTE Risk Mitigation (From admission, onward)         Ordered     Place sequential compression device  Until discontinued         01/26/23 1890                Discharge Planning   CARLITOS:      Code Status: DNR   Is the patient medically ready for discharge?:     Reason for patient still in hospital (select all that apply): Treatment                     Juanito Huggins DO  Department of Hospital Medicine   [facility]

## 2023-01-27 NOTE — HPI
89 yo F presents to the ED after an accidental fall at home.  She hit her head and has LLE pain.  She is on eliquis for CAF and head CT negative for hemorrhage but xray of left hip and pelvis showed an intertrochanteric fracture.  Dr. Cheung has been following her after a fall and suffering a right humeral  fracture.  Her daughter, Stephanie Ramos who works in [employer] here at [facility], says that she has a very unsteady gait and fall frequently.  She did not have focal weakness or episode of syncope.      Patient is very [place] and has some mild dementia.  Her daughter does not report any complaints of CP, SOB, palpitations or N/V/D.

## 2023-01-27 NOTE — ANESTHESIA PREPROCEDURE EVALUATION
01/27/2023  Kymberly Franco is a 89 y.o., female.      Pre-op Assessment    I have reviewed the Patient Summary Reports.     I have reviewed the Nursing Notes. I have reviewed the NPO Status.   I have reviewed the Medications.     Review of Systems  Anesthesia Hx:  No problems with previous Anesthesia    Social:  Non-Smoker, No Alcohol Use    Hematology/Oncology:  Hematology Normal   Oncology Normal     EENT/Dental:EENT/Dental Normal   Cardiovascular:   Hypertension    Pulmonary:  Pulmonary Normal    Renal/:  Renal/ Normal     Hepatic/GI:   GERD    Musculoskeletal:  Musculoskeletal Normal    Neurological:   alzheimers dementia Dementia    Endocrine:   Hypothyroidism    Dermatological:  Skin Normal    Psych:  Psychiatric Normal           Physical Exam  General: Well nourished    Airway:  Mallampati: II / II  Mouth Opening: Normal  TM Distance: > 6 cm  Tongue: Normal  Neck ROM: Normal ROM    Chest/Lungs:  Clear to auscultation, Normal Respiratory Rate    Heart:  Rate: Normal  Rhythm: Regular Rhythm        Anesthesia Plan  Type of Anesthesia, risks & benefits discussed:    Anesthesia Type: Spinal  Intra-op Monitoring Plan: Standard ASA Monitors  Post Op Pain Control Plan: multimodal analgesia  Induction:  IV  Informed Consent: Informed consent signed with the Patient and all parties understand the risks and agree with anesthesia plan.  All questions answered. Patient consented to blood products? Yes  ASA Score: 4  Day of Surgery Review of History & Physical: H&P Update referred to the surgeon/provider.I have interviewed and examined the patient. I have reviewed the patient's H&P dated: There are no significant changes. H&P completed by Anesthesiologist.    Ready For Surgery From Anesthesia Perspective.     .

## 2023-01-27 NOTE — OP NOTE
[facility]  General Surgery  Operative Note    SUMMARY     Date of Procedure: 1/27/2023     Procedure: Procedure(s) (LRB):  INSERTION, INTRAMEDULLARY CLARISSA, FEMUR, TROCHANTER (Left)       Surgeon(s) and Role:     * Andry Cheung MD - Primary    Assisting Surgeon: None    Pre-Operative Diagnosis: Intertrochanteric fracture of femur [S72.143A]    Post-Operative Diagnosis: Post-Op Diagnosis Codes:     * Intertrochanteric fracture of femur [S72.143A]    Anesthesia:General    Operative Findings (including complications, if any):  After risks and benefits of procedure explained at length patient family patient family stated understanding risks and benefits and wished to proceed with the procedure a written informed consent was obtained.  Patient was taken to the operating room placed in supine position on operative table anesthesia induced per Anesthesia.  She was then transferred to the fracture table left lower extremity was then reduced reducing the intertrochanteric femur fracture.  At this time left lower extremities prepped draped sterile fashion.  Guide pin for the gamma 3 set from [employer] was then placed percutaneously through the tip of the trochanter confirmed under fluoroscopic vision.  At this time a 4 cm incision was made around the guide pin and the opening Reamer then placed at the tip of the trochanter the trochanter open in the prone femur open.  At this time a guide clarissa was placed down across fracture site measured to be 380 mm nail.  Sequential reaming over the guide pin was performed and an 11 x 38125 degree gamma 3 nail was then placed going through the guide a guide pin was placed in center center position of the femoral head measured to be 95 mm and a 95 mm compression hip screw placed and secured with a set screw.  A distal locking screw was placed percutaneously 42.5 mm.  All screw lengths confirmed under fluoroscopic vision.  At this time insertion device removed.  Wounds  irrigated out copious amounts normal saline.  Deep tissues closed with 2-0 Vicryl.  Subcuticular 2-0 Vicryl followed by skin staples.  Sterile occlusive dressing placed.  Patient was then awakened taken recovery room in good condition.  All counts correct.  No complications.    Description of Technical Procedures:     Significant Surgical Tasks Conducted by the Assistant(s), if Applicable:     Estimated Blood Loss (EBL): 75 mL           Implants:   Implant Name Type Inv. Item Serial No.  Lot No. LRB No. Used Action   GUIDE WIRE [id number] BALL TIP - [id number]  GUIDE WIRE [id number] BALL TIP  [employer].  Left 1 Implanted and Explanted   GUIDEWIRE 3.2 X 450 - [id number]  GUIDEWIRE 3.2 X 450  [employer].  Left 1 Implanted and Explanted   KIT NAIL IM GAMMA [id number] - [id number]  KIT NAIL IM GAMMA [id number] TI  [employer].  Left 1 Implanted   SCREW LAG TITANIUM 10.5X95 - [id number]  SCREW LAG TITANIUM 10.5X95  [employer].  Left 1 Implanted   SCREW LOCKING 5 X 42.5 - [id number]  SCREW LOCKING 5 X 42.5  [employer].  Left 1 Implanted       Specimens:   Specimen (24h ago, onward)      None                    Condition: Good    Disposition: PACU - hemodynamically stable.    Attestation: I was present and scrubbed for the entire procedure.

## 2023-01-27 NOTE — PLAN OF CARE
[facility]  Initial Discharge Assessment       Primary Care Provider: Andry Galarza MD    Admission Diagnosis: Fall [W19.XXXA]  Pre-op chest exam [Z01.811]  Atrial fibrillation with tachycardic ventricular rate [I48.91]  Intertrochanteric fracture of femur [S72.143A]  Primary hypertension [I10]    Admission Date: 1/26/2023  Expected Discharge Date:     Discharge Barriers Identified: None    Payor: MEDICARE / Plan: MEDICARE PART A & B / Product Type: Government /     Extended Emergency Contact Information  Primary Emergency Contact: TUAN DAMIAN  Address: [address] [place] [address] [facility]  Home Phone: [phone]  Mobile Phone: [phone]  Relation: Relative  Preferred language: English   needed? No    Discharge Plan A: Other (AMY Quinones)  Discharge Plan B: Other (AMY Quinones)      [facility] - [place], [place] - [address] [facility]  [address] [facility] [address]  Phone: [phone] Fax: [phone]    [id number] - [place], [place] - [address] AT [facility] [address]  [address]  [place] [address]  Phone: [phone] Fax: [phone]      Initial Assessment (most recent)       Adult Discharge Assessment - 01/27/23 1317          Discharge Assessment    Assessment Type Discharge Planning Assessment     Confirmed/corrected address, phone number and insurance Yes     Confirmed Demographics Correct on Facesheet     Source of Information family     If unable to respond/provide information was family/caregiver contacted? Yes     Contact Name/Number daughter     Does patient/caregiver understand observation status Yes     Communicated CARLITOS with patient/caregiver Date not available/Unable to determine     People in Home child(chucky), adult     Facility Arrived From: home     Do you expect to return to your current living situation? Yes     Do you have  help at home or someone to help you manage your care at home? Yes     Who are your caregiver(s) and their phone number(s)? daughter     Prior to hospitilization cognitive status: Unable to Assess     Current cognitive status: Unable to Assess     Equipment Currently Used at Home walker, rolling;bedside commode     Readmission within 30 days? No     Patient currently being followed by outpatient case management? No     Do you currently have service(s) that help you manage your care at home? No     Do you take prescription medications? Yes     Do you have prescription coverage? Yes     Do you have any problems affording any of your prescribed medications? No     Is the patient taking medications as prescribed? yes     How do you get to doctors appointments? family or friend will provide     Are you on dialysis? No     Do you take coumadin? No     Discharge Plan A Other   [facility]    Discharge Plan B Other   [facility]    DME Needed Upon Discharge  none     Discharge Plan discussed with: Adult children     Discharge Barriers Identified None        Physical Activity    On average, how many days per week do you engage in moderate to strenuous exercise (like a brisk walk)? 0 days     On average, how many minutes do you engage in exercise at this level? 0 min        Financial Resource Strain    How hard is it for you to pay for the very basics like food, housing, medical care, and heating? Not hard at all        Housing Stability    In the last 12 months, was there a time when you were not able to pay the mortgage or rent on time? No     In the last 12 months, how many places have you lived? 1     In the last 12 months, was there a time when you did not have a steady place to sleep or slept in a shelter (including now)? No        Transportation Needs    In the past 12 months, has lack of transportation kept you from medical appointments or from getting medications? No     In the past 12 months, has lack of  transportation kept you from meetings, work, or from getting things needed for daily living? No        Food Insecurity    Within the past 12 months, you worried that your food would run out before you got the money to buy more. Never true     Within the past 12 months, the food you bought just didn't last and you didn't have money to get more. Never true        Stress    Do you feel stress - tense, restless, nervous, or anxious, or unable to sleep at night because your mind is troubled all the time - these days? Not at all        Social Connections    In a typical week, how many times do you talk on the phone with family, friends, or neighbors? More than three times a week     How often do you get together with friends or relatives? More than three times a week     How often do you attend Confucianism or Nondenominational services? 1 to 4 times per year     How often do you attend meetings of the clubs or organizations you belong to? 1 to 4 times per year     Are you , , , , never , or living with a partner?         Alcohol Use    Q1: How often do you have a drink containing alcohol? Never     Q2: How many drinks containing alcohol do you have on a typical day when you are drinking? Patient does not drink     Q3: How often do you have six or more drinks on one occasion? Never                   Spoke with pts daughter. Pt lives home with her. If needed swb choice obtained for [employer]. Daughter is interested in a bed alarm for her mother to assist with decreasing falls. Called the medical store price $175 plus tax. Will notify daughter. Daughter would like to wait on the bed alarm if pt goes to [facility]. Will follow dc needs as arise.

## 2023-01-27 NOTE — PLAN OF CARE
1214  pt to pacu pt resp reg and non labored pt awake pt sao2 100%   on 7l fm pt sr per scope pt pain level 0 dsg d/I to l thigh and hip will monitor     1240 pt vs stable pt resting well pt sao2 99% on 2l nbp pt pain level 0 at present    1300 pt vs stable pt sao2 99% on 2l nbp pt b/p 100s systolic pt pain level 0 orders to release to room per md    1315 pt released to k chris  Rn b/p 98/46 pulse 65 resp 16 sao2 100% on ra temp 97.8 oral

## 2023-01-27 NOTE — ED PROVIDER NOTES
Encounter Date: 1/26/2023    SCRIBE #1 NOTE: I, Anali Bullock, am scribing for, and in the presence of,  Leandro Nichols MD. I have scribed the entire note.     History     Chief Complaint   Patient presents with    Hip Pain     89 y.o. female presents to the ED with complaints of hip pain. Daughter stated the patient fell at home. Daughter stated the patient hit her head when she fell. Patient lives with daughter. Daughter stated the patient has hypertension and afib. Patient takes Eliquis.     The history is provided by the patient and a relative. No  was used.   Review of patient's allergies indicates:   Allergen Reactions    Cipro [ciprofloxacin hcl]     Pcn [penicillins] Hives and Itching     Past Medical History:   Diagnosis Date    Alzheimer's dementia with anxiety     Anticoagulant long-term use     GERD (gastroesophageal reflux disease)     Hypertension     Mixed hyperlipidemia     Paroxysmal atrial fibrillation     Thyroid disease      Past Surgical History:   Procedure Laterality Date    APPENDECTOMY       Family History   Problem Relation Age of Onset    No Known Problems Mother     No Known Problems Father      Social History     Tobacco Use    Smoking status: Never    Smokeless tobacco: Never   Substance Use Topics    Alcohol use: Never    Drug use: Never     Review of Systems   Constitutional: Negative.    HENT: Negative.     Eyes: Negative.    Respiratory: Negative.     Cardiovascular: Negative.    Gastrointestinal: Negative.    Endocrine: Negative.    Genitourinary: Negative.    Musculoskeletal:         Hip pain.    Skin: Negative.    Allergic/Immunologic: Negative.    Neurological: Negative.    Hematological: Negative.    Psychiatric/Behavioral: Negative.     All other systems reviewed and are negative.    Physical Exam     Initial Vitals [01/26/23 1944]   BP Pulse Resp Temp SpO2   121/61 78 20 98.8 °F (37.1 °C) 98 %      MAP       --         Physical Exam    Vitals  reviewed.  Constitutional: She appears well-developed and well-nourished. No distress.   HENT:   Head: Normocephalic.   Patient is hard of hearing.    Eyes: Conjunctivae are normal. Pupils are equal, round, and reactive to light.   Abdominal: Abdomen is soft. Bowel sounds are normal.   Musculoskeletal:      Right hip: Tenderness present.      Comments: Right leg is externally rotated and shortened.      Neurological: She is alert and oriented to person, place, and time.   Skin: Skin is warm and dry.   Psychiatric: She has a normal mood and affect.       ED Course   Procedures  Labs Reviewed   COMPREHENSIVE METABOLIC PANEL - Abnormal; Notable for the following components:       Result Value    Glucose 123 (*)     BUN 35 (*)     Creatinine 1.87 (*)     eGFR 24 (*)     All other components within normal limits   PROTIME-INR - Abnormal; Notable for the following components:    PT 15.5 (*)     All other components within normal limits   CBC WITH DIFFERENTIAL - Abnormal; Notable for the following components:    RBC 3.68 (*)     Hemoglobin 10.4 (*)     Hematocrit 34.1 (*)     MCHC 30.5 (*)     MPV 8.6 (*)     Neutrophils % 46.4 (*)     Monocytes % 6.9 (*)     Eosinophils % 5.3 (*)     Immature Granulocytes % 0.6 (*)     All other components within normal limits   URINALYSIS, REFLEX TO URINE CULTURE - Abnormal; Notable for the following components:    Leukocytes, UA Large (*)     Nitrites, UA Positive (*)     Blood, UA Small (*)     All other components within normal limits   URINALYSIS, MICROSCOPIC - Abnormal; Notable for the following components:    WBC, UA 15-25 (*)     RBC, UA 3-5 (*)     Bacteria, UA Many (*)     Squamous Epithelial Cells, UA Few (*)     WBC Clumps Few (*)     All other components within normal limits   APTT - Normal   SARS-COV-2 RNA AMPLIFICATION, QUAL - Normal    Narrative:     Negative SARS-CoV results should not be used as the sole basis for treatment or patient management decisions; negative  results should be considered in the context of a patient's recent exposures, history and the presene of clinical signs and symptoms consistent with COVID-19.  Negative results should be treated as presumptive and confirmed by molecular assay, if necessary for patient management.   CULTURE, URINE   CBC W/ AUTO DIFFERENTIAL    Narrative:     The following orders were created for panel order CBC auto differential.  Procedure                               Abnormality         Status                     ---------                               -----------         ------                     CBC with Differential[[id number]]        Abnormal            Final result                 Please view results for these tests on the individual orders.   EXTRA TUBES    Narrative:     The following orders were created for panel order EXTRA TUBES.  Procedure                               Abnormality         Status                     ---------                               -----------         ------                     Light Green Top Hold[[id number]]                             In process                 Lavender Top Hold[[id number]]                                In process                 Pink Top Hold[[id number]]                                    In process                   Please view results for these tests on the individual orders.   LIGHT GREEN TOP HOLD   LAVENDER TOP HOLD   PINK TOP HOLD     EKG Readings: (Independently Interpreted)   Rhythm: Sinus Arrhythmia. Heart Rate: 70. Clinical Impression: AV Block - 1st Degree   Interpreted by Dr. Nichols at 20:10.      Imaging Results              CT Head Without Contrast (Final result)  Result time 01/26/23 21:09:59      Final result by Manjit Sinclair MD (01/26/23 21:09:59)                   Impression:      1. No acute intracranial abnormality.    2.  Mild generalized parenchymal atrophy and findings suggestive of sequela of chronic microvascular ischemia.    Place of service: [facility]      Electronically signed by: Manjit Sinclair  Date:    01/26/2023  Time:    21:09               Narrative:    EXAMINATION:  CT HEAD WITHOUT CONTRAST    CLINICAL HISTORY:  Head trauma, minor (Age >= 65y);    TECHNIQUE:  Axial CT imaging from the vertex to skull the skull base was performed without contrast. Total DLP: 983 mGy*cm    Dose reduction:    The CT exam was performed using one or more dose reduction techniques: Automatic exposure control, automated adjustment of the MA and/or kVP according to patient size, or use of iterative reconstruction technique.    COMPARISON:  Prior CT 10/12/2018.    FINDINGS:  Atrophy is noted with prominence of sulci and ventricles.  The basilar cisterns are within normal limits in appearance. There is no evidence of hydrocephalus, midline shift or mass effect.  Periventricular hypodensity changes are noted, which are primarily mild and favored to represent sequela of chronic microvascular ischemia.  Otherwise, the gray and white matter differentiation is preserved. There is no CT evidence of acute intracranial hemorrhage or infarction.    The calvarium is intact. The visualized orbits and globes appear within normal limits.  There is mild mucosal thickening within the left maxillary sinus.  Otherwise, the paranasal sinuses and mastoid air cells are predominantly clear. Scalp soft tissues appear unremarkable.                                       X-Ray Chest AP Portable (Final result)  Result time 01/26/23 20:45:02      Final result by Manjit Sinclair MD (01/26/23 20:45:02)                   Impression:      No acute cardiopulmonary process or significant change.    Place of service: [facility]      Electronically signed by: Manjit Sinclair  Date:    01/26/2023  Time:    20:45               Narrative:    EXAMINATION:  XR CHEST AP PORTABLE    CLINICAL HISTORY:  Encounter for preprocedural respiratory examination    COMPARISON:  Prior radiograph  06/13/2020    FINDINGS:  The cardiomediastinal silhouette is within normal limits. Mildly prominent interstitial both lungs are similar mild scarring/COPD changes to prior and suggestive of underlying.  Prominent atherosclerotic changes are noted of the thoracic aorta, similar to prior.  There is no pneumothorax or pleural effusion.    There is no acute osseous or soft tissue abnormality.                                       X-Ray Hip 2 or 3 views Left (with Pelvis when performed) (Final result)  Result time 01/26/23 20:56:45      Final result by Manjit Sinclair MD (01/26/23 20:56:45)                   Impression:      As above.    Place of service: [facility]      Electronically signed by: Manjit Sinclair  Date:    01/26/2023  Time:    20:56               Narrative:    EXAMINATION:  XR pelvis with hip two views left    CLINICAL HISTORY:  Left femur FRAX    COMPARISON:  None available    FINDINGS:  Osseous pelvis appears intact.  Osteopenia is noted.  There is an acute left femoral intertrochanteric femoral neck fracture.  No definite suspicious osseous or soft tissue lesions are identified.  Overlying soft tissue swelling is noted.  There is no suggestion of dislocation of the femoral head from the acetabulum.                                    X-Rays:   Independently Interpreted Readings:   Other Readings:  X-ray Hip and pelvis :  Left femoral neck fracture  Medications   acetaminophen tablet 1,000 mg (has no administration in time range)   bisacodyL EC tablet 10 mg (has no administration in time range)   dextromethorphan-guaiFENesin  mg/5 ml liquid 10 mL (has no administration in time range)   ondansetron injection 8 mg (has no administration in time range)   simethicone chewable tablet 80 mg (has no administration in time range)   traZODone tablet 50 mg (has no administration in time range)   oxyCODONE immediate release tablet 5 mg (5 mg Oral Given 1/26/23 3397)   morphine injection 2 mg  (has no administration in time range)   diazePAM tablet 2 mg (has no administration in time range)   diltiaZEM 24 hr capsule 180 mg (has no administration in time range)   donepeziL tablet 10 mg (10 mg Oral Given 1/26/23 2259)   isosorbide mononitrate 24 hr tablet 60 mg (has no administration in time range)   levothyroxine tablet 112 mcg (has no administration in time range)   metoprolol tartrate (LOPRESSOR) tablet 50 mg (50 mg Oral Given 1/26/23 2259)   losartan tablet 100 mg (has no administration in time range)   pantoprazole EC tablet 40 mg (has no administration in time range)   sertraline tablet 50 mg (has no administration in time range)   cefTRIAXone (ROCEPHIN) 1 g in dextrose 5 % in water (D5W) 5 % 50 mL IVPB (MB+) (0 g Intravenous Stopped 1/26/23 2329)   0.9%  NaCl infusion ( Intravenous New Bag 1/26/23 2351)   memantine tablet 10 mg (10 mg Oral Given 1/26/23 2259)   morphine injection 2 mg (2 mg Intravenous Given 1/26/23 2100)   orphenadrine injection 60 mg (60 mg Intravenous Given 1/26/23 2204)   sodium chloride 0.9% infusion (200 mLs  New Bag 1/26/23 2206)     Medical Decision Making:   Initial Assessment:   A 89-year-old female patient fell at home came with left leg shortened and externally rotated  Clinical Tests:   Lab Tests: Ordered and Reviewed  Radiological Study: Ordered and Reviewed  Medical Tests: Ordered and Reviewed  ED Management:  X-ray showed left femoral neck fracture  IV morphine  IV Zofran  Ortho consulted  Hospitalist consulted for admission  Other:   I discussed test(s) with the performing physician.       <> Summary of the Findings: Dr. Nichols has discussed this case with Dr. Cheung and has consulted with Dr. Camarillo.           Attending Attestation:           Physician Attestation for Scribe:  Physician Attestation Statement for Scribe #1: I, Leandro Nichols MD, reviewed documentation, as scribed by Anali Bullock in my presence, and it is both accurate and complete.                         Clinical Impression:   Final diagnoses:  [W19.XXXA] Fall  [Z01.811] Pre-op chest exam  [I48.91] Atrial fibrillation with tachycardic ventricular rate  [S72.143A] Intertrochanteric fracture of femur        ED Disposition Condition    Admit                 Leandro Nichols MD  01/27/23 0221

## 2023-01-27 NOTE — HPI
89-year-old female sustained a fall yesterday has an intertrochanteric femur fracture left needing intramedullary rodding of her left femur  Left lower extremity she moves her toes she has sensation to touch has palpable pulses tender palpation of the greater trochanter pain on attempted motion of the hip her legs shortened and externally rotated.  Show displaced intertrochanteric femur fracture on left  Impression displaced intertrochanteric femur fracture on the left  Plan left intramedullary rodding of the femur fracture

## 2023-01-27 NOTE — CONSULTS
[facility]  Orthopedics  Consult Note    Patient Name: Kymberly Franco  MRN: [id number]  Admission Date: 1/26/2023  Hospital Length of Stay: 1 days  Attending Provider: Juanito Huggins DO  Primary Care Provider: Andry Galarza MD    Patient information was obtained from patient and ER records.     Consults  Subjective:     Principal Problem:Intertrochanteric fracture of left femur    Chief Complaint:   Chief Complaint   Patient presents with    Hip Pain        HPI: 89-year-old female sustained a fall yesterday has an intertrochanteric femur fracture left needing intramedullary rodding of her left femur  Left lower extremity she moves her toes she has sensation to touch has palpable pulses tender palpation of the greater trochanter pain on attempted motion of the hip her legs shortened and externally rotated.  Show displaced intertrochanteric femur fracture on left  Impression displaced intertrochanteric femur fracture on the left  Plan left intramedullary rodding of the femur fracture      Past Medical History:   Diagnosis Date    Alzheimer's dementia with anxiety     Anticoagulant long-term use     GERD (gastroesophageal reflux disease)     Hypertension     Mixed hyperlipidemia     Paroxysmal atrial fibrillation     Thyroid disease        Past Surgical History:   Procedure Laterality Date    APPENDECTOMY         Review of patient's allergies indicates:   Allergen Reactions    Cipro [ciprofloxacin hcl]     Pcn [penicillins] Hives and Itching       Current Facility-Administered Medications   Medication    0.9%  NaCl infusion    acetaminophen tablet 1,000 mg    bisacodyL EC tablet 10 mg    cefTRIAXone (ROCEPHIN) 1 g in dextrose 5 % in water (D5W) 5 % 50 mL IVPB (MB+)    dextromethorphan-guaiFENesin  mg/5 ml liquid 10 mL    diazePAM tablet 2 mg    diltiaZEM 24 hr capsule 180 mg    donepeziL tablet 10 mg    isosorbide mononitrate 24 hr tablet 60 mg    levothyroxine tablet  "112 mcg    losartan tablet 100 mg    memantine tablet 10 mg    metoprolol tartrate (LOPRESSOR) tablet 50 mg    morphine injection 2 mg    ondansetron injection 8 mg    oxyCODONE immediate release tablet 5 mg    pantoprazole EC tablet 40 mg    sertraline tablet 50 mg    simethicone chewable tablet 80 mg    traZODone tablet 50 mg     Family History       Problem Relation (Age of Onset)    No Known Problems Mother, Father          Tobacco Use    Smoking status: Never    Smokeless tobacco: Never   Substance and Sexual Activity    Alcohol use: Never    Drug use: Never    Sexual activity: Not Currently     Review of Systems   Musculoskeletal:  Positive for joint pain.   Objective:     Vital Signs (Most Recent):  Temp: 97.8 °F (36.6 °C) (01/27/23 0636)  Pulse: 69 (01/27/23 0636)  Resp: 18 (01/27/23 0653)  BP: 131/66 (01/27/23 0636)  SpO2: (!) 94 % (01/27/23 0636)   Vital Signs (24h Range):  Temp:  [97.1 °F (36.2 °C)-98.8 °F (37.1 °C)] 97.8 °F (36.6 °C)  Pulse:  [61-78] 69  Resp:  [12-20] 18  SpO2:  [71 %-100 %] 94 %  BP: (121-147)/(52-79) 131/66     Weight: 52.2 kg (115 lb)  Height: 5' 2" (157.5 cm)  Body mass index is 21.03 kg/m².    No intake or output data in the 24 hours ending 01/27/23 0918            Left Hip Exam     Inspection   Deformity of hip.    Tenderness   The patient tender to palpation of the trochanteric bursa.    Other   Sensation: normal          Vascular Exam       Left Pulses  Dorsalis Pedis:      2+          Significant Labs: All pertinent labs within the past 24 hours have been reviewed.    Significant Imaging: I have reviewed all pertinent imaging results/findings.    Assessment/Plan:     No notes have been filed under this hospital service.  Service: Orthopedic Surgery      Thank you for your consult. Intramedullary rodding left femur fracture    Andry Cheung MD  Orthopedics  [facility]      "

## 2023-01-27 NOTE — ASSESSMENT & PLAN NOTE
Patient with Long standing persistent (>12 months) atrial fibrillation which is controlled currently with Beta Blocker. Patient is currently in atrial fibrillation.[id number] Score: 3. HASBLED Score: 1. Anticoagulation indicated. Anticoagulation done with eliquis     Continue BB and hold anticoagulation due to pending procedure.  .

## 2023-01-27 NOTE — ANESTHESIA PROCEDURE NOTES
Intubation    Date/Time: 1/27/2023 10:19 AM  Performed by: Urbano Gomez CRNA  Authorized by: Sylvester Breen MD     Intubation:     Induction:  Intravenous    Intubated:  Postinduction    Mask Ventilation:  Easy mask    Attempts:  1    Attempted By:  CRNA    Method of Intubation:  Direct    Blade:  Sophie 3    Laryngeal View Grade: Grade IIA - cords partially seen      Difficult Airway Encountered?: No      Complications:  None    Airway Device:  Oral endotracheal tube    Airway Device Size:  7.5    Style/Cuff Inflation:  Cuffed (inflated to minimal occlusive pressure)    Tube secured:  21    Secured at:  The lips    Placement Verified By:  Capnometry    Complicating Factors:  None    Findings Post-Intubation:  BS equal bilateral

## 2023-01-27 NOTE — SUBJECTIVE & OBJECTIVE
Interval History: naeo    Review of Systems   Reason unable to perform ROS: dementia.   Objective:     Vital Signs (Most Recent):  Temp: 97.8 °F (36.6 °C) (01/27/23 0636)  Pulse: 69 (01/27/23 0636)  Resp: 17 (01/27/23 0951)  BP: 131/66 (01/27/23 0636)  SpO2: (!) 94 % (01/27/23 0636)   Vital Signs (24h Range):  Temp:  [97.1 °F (36.2 °C)-98.8 °F (37.1 °C)] 97.8 °F (36.6 °C)  Pulse:  [61-78] 69  Resp:  [12-20] 17  SpO2:  [71 %-100 %] 94 %  BP: (121-147)/(52-79) 131/66     Weight: 52.2 kg (115 lb)  Body mass index is 21.03 kg/m².  No intake or output data in the 24 hours ending 01/27/23 1040   Physical Exam  Vitals reviewed.   Constitutional:       Appearance: Normal appearance.   HENT:      Head: Normocephalic and atraumatic.   Eyes:      Extraocular Movements: Extraocular movements intact.   Cardiovascular:      Rate and Rhythm: Normal rate and regular rhythm.      Pulses: Normal pulses.   Pulmonary:      Effort: Pulmonary effort is normal.      Breath sounds: Normal breath sounds.   Abdominal:      General: Abdomen is flat.      Palpations: Abdomen is soft.   Musculoskeletal:         General: Tenderness present.   Skin:     General: Skin is warm and dry.      Capillary Refill: Capillary refill takes less than 2 seconds.   Neurological:      General: No focal deficit present.      Mental Status: She is alert.   Psychiatric:         Mood and Affect: Mood normal.         Behavior: Behavior normal.       Significant Labs: All pertinent labs within the past 24 hours have been reviewed.    Significant Imaging: I have reviewed all pertinent imaging results/findings within the past 24 hours.

## 2023-01-28 NOTE — PT/OT/SLP PROGRESS
Physical Therapy      Patient Name:  Kymberly Franco   MRN:  [id number]    Patient not seen today secondary to Nurse/ CHLOE hold. Pt demonstrated decreased responsiveness this morning, requiring an RRT. This afternoon, RN reports pt still with decreased responsiveness and recommends holding PT evaluation. Will follow-up tomorrow.

## 2023-01-28 NOTE — PROGRESS NOTES
[facility]  [facility] Medicine  Progress Note    Patient Name: Kymberly Franco  MRN: [id number]  Patient Class: IP- Inpatient   Admission Date: 1/26/2023  Length of Stay: 2 days  Attending Physician: Tarun Deluna DO  Primary Care Provider: Andry Galarza MD        Subjective:     Principal Problem:Intertrochanteric fracture of left femur        HPI:  89 yo F presents to the ED after an accidental fall at home.  She hit her head and has LLE pain.  She is on eliquis for CAF and head CT negative for hemorrhage but xray of left hip and pelvis showed an intertrochanteric fracture.  Dr. Cheung has been following her after a fall and suffering a right humeral  fracture.  Her daughter, Stephanie Ramos who works in [employer] here at [facility], says that she has a very unsteady gait and fall frequently.  She did not have focal weakness or episode of syncope.      Patient is very [place] and has some mild dementia.  Her daughter does not report any complaints of CP, SOB, palpitations or N/V/D.      Overview/Hospital Course:  1/27-no complaints today. NPO for OR  1/28- the patient underwent repair of left femur fracture yesterday, which went well.  She seems to have an acute mental status change this a.m. being very hard to arouse and elicit a response.  An RRT was called on the patient's a.m..  Chest x-ray shows left lower lobe infiltrate possible pneumonia.  The patient had sedation for surgery as well as trazodone last night.  Possible lingering affects.  Added metronidazole with ceftriaxone for possible aspiration pneumonia.  Patient has a penicillin allergy.      No new subjective & objective note has been filed under this hospital service since the last note was generated.    Physical Exam  Vitals reviewed.   Constitutional:       Appearance: Normal appearance.   HENT:      Head: Normocephalic and atraumatic.   Eyes:      Extraocular Movements: Extraocular movements intact.   Cardiovascular:      Rate  and Rhythm: Normal rate and regular rhythm.      Pulses: Normal pulses.   Pulmonary:      Effort: Pulmonary effort is normal.      Breath sounds: Normal breath sounds.   Abdominal:      General: Abdomen is flat.      Palpations: Abdomen is soft.   Musculoskeletal:         General: Tenderness present.   Skin:     General: Skin is warm and dry.      Capillary Refill: Capillary refill takes less than 2 seconds.   Neurological:      General: No focal deficit present.      Mental Status: She is alert.   Psychiatric:         Mood and Affect: Mood normal.         Behavior: Behavior normal.     Assessment/Plan:      * Intertrochanteric fracture of left femur  Patient 1 day postop.  No complaints of pain, but patient is very somnolent today.    Alzheimer's dementia with anxiety  Continue home valium prn nightly.  Will hold until mental status improve  Continue aricept and namenda combination.     GERD (gastroesophageal reflux disease)  Continue PPI    Hypertension    Continue home antihypertensive agents    Mixed hyperlipidemia  Continue home statin.      Paroxysmal atrial fibrillation  Patient with Long standing persistent (>12 months) atrial fibrillation which is controlled currently with Beta Blocker. Patient is currently in atrial fibrillation.[id number] Score: 3. HASBLED Score: 1. Anticoagulation indicated. Anticoagulation done with eliquis     Continue BB and hold anticoagulation due to pending procedure.  .    Thyroid disease  Continue home synthroid.     Aspiration pneumonia   -add metronidazole IV q.8 hours to ceftriaxone.  Patient has penicillin allergy.  Repeat chest x-ray 48 hours.      VTE Risk Mitigation (From admission, onward)         Ordered     apixaban tablet 2.5 mg  2 times daily         01/27/23 4369     IP VTE HIGH RISK PATIENT  Once         01/27/23 5989     Place JETHRO hose  Until discontinued         01/27/23 1549     Place sequential compression device  Until discontinued         01/27/23 1549      Place sequential compression device  Until discontinued         01/26/23 1455                Discharge Planning   CARLITOS:      Code Status: DNR   Is the patient medically ready for discharge?:     Reason for patient still in hospital (select all that apply): Treatment  Discharge Plan A: Other (AMY Quinones)            Tarun Deluna DO  Department of Hospital Medicine   [facility]

## 2023-01-28 NOTE — PLAN OF CARE
Consult for swb/home health and rw/bsc. Patient had surgery 1/27 and PT/OT evals are pending. Choice already obtained for swb at [facility]. Tried to call daughter in room and at number listed in chart. No answer. Will fax initial referral to [facility].  to follow. Packet started.

## 2023-01-28 NOTE — PT/OT/SLP PROGRESS
Occupational Therapy      Patient Name:  Kymberly Franco   MRN:  [id number]    Patient not seen today secondary to Nurse/ CHLOE hold. Pt had RRT this AM and is not more alert this PM. ELIZABETH Burnett requests to hold eval until tomorrow. Will follow-up 1/29/2023.    1/28/2023

## 2023-01-28 NOTE — PLAN OF CARE
Problem: Adult Inpatient Plan of Care  Goal: Plan of Care Review  Outcome: Ongoing, Progressing  Flowsheets (Taken 1/27/2023 1824)  Plan of Care Reviewed With:   patient   daughter  Goal: Patient-Specific Goal (Individualized)  Outcome: Ongoing, Progressing  Flowsheets (Taken 1/27/2023 1824)  Anxieties, Fears or Concerns: none  Individualized Care Needs: pain management  Goal: Absence of Hospital-Acquired Illness or Injury  Outcome: Ongoing, Progressing  Goal: Optimal Comfort and Wellbeing  Outcome: Ongoing, Progressing  Goal: Readiness for Transition of Care  Outcome: Ongoing, Progressing

## 2023-01-28 NOTE — NURSING
1940 initial bp was 60/33 manually. Patient awake and alert. No signs of distress noted. Will notify MD, considering patient's BP was been low, but not this low.    1948 Contacted Dr. Camarillo with the hospitalist team to inform her of patient's BP and current status over all. Dr. Camarillo ordered for me to hold the HS metoprolol and the AM losartan. No additional orders received.     2040 Patient's family member called out asking me to come to the room. Entered patient's room and the daughter states that the patient is hurting some and she wanted to get ahead of her pain. Informed her that, unfortunately, I am unable to give narcotics for pain relief right now, as her BP is too low, and one of the biggest side effect of narcotics is a drop in BP. V/U. Informed patient and family that I can give her some tylenol and a trazodone to help her get some rest tonight, family and patient agreeable to plan.     2139 administered HS medications, PRN tylenol and PRN trazodone. After swallowing medications, and taking an additional sip of water, patient got choked up, and vomited a little. Cleaned patient up, changed gown and linens. Denies further needs. Left comfortable in bed with nadn and talking to her family. Sating 96% on 2LNC.    2220 resting in bed with nadn. Rise and fall of chest noted. Cb in reach. O2@2LNC and sating 97    0017 hung IV rocephin. Patient resting in bed. Appears to be sleeping with rise and fall of chest noted. Easily aroused to tactile stimuli. O2@2LNC, sating 95%.    0258 hung IV clindamycin. Patient resting in bed with eyes closed. Rise and fall of chest noted with cb in reach. Family at side. Aroused when I entered the room.     0302 hung new bag of NS @ 75mL/hr.    0515 Patient is resting in bed. Appears to be sleeping with NADN. Rise and fall of chest noted. Call bell and all personal items in reach. Bed alarm set. Family at side. Per order, escobar catheter is to d/c @0500 this AM, but patient  only had 500 mL dark urine my entire shift with roughly 1200+ mL IV fluids + 150 ml water/juice. Will leave in and report to day team incase they wanted to leave it in a little longer to more accurately monitor output.    0638 entered room for last round, patient is resting in bed with eyes closed. Green vomit noted to gown. Asked family member if she knew she had thrown up. States she dozed back off after my last round, so she was unaware. Changed patient and linens. Patient asleep through this. Patient is hooked to the dinamap. O2@2LNC, sating 97%. Radial pulse 2+ bilaterally. Patient unresponsive to verbal stimuli. Patient nonresponsive to tactile stimuli. Grimacing noted to face during sternal rub. RRT initiated. Informed Dr. Parry and seem that patient got choked last night on her medications but otherwise, has been easy to arouse all night, with no other issues. Family at bedside. See Rrt documentation by Gabby Quiroga RN who recorded.    0720 bedside report given to ELIZABETH Burnett who will be taking over patient care. Family at bedside. Patient unresponsive. VSS

## 2023-01-28 NOTE — PLAN OF CARE
Problem: Infection  Goal: Absence of Infection Signs and Symptoms  Outcome: Ongoing, Not Progressing  Intervention: Prevent or Manage Infection  Flowsheets (Taken 1/28/2023 0907)  Infection Management: aseptic technique maintained  Isolation Precautions: precautions maintained     Problem: Impaired Wound Healing  Goal: Optimal Wound Healing  Outcome: Ongoing, Not Progressing  Intervention: Promote Wound Healing  Flowsheets (Taken 1/28/2023 0907)  Oral Nutrition Promotion: rest periods promoted  Sleep/Rest Enhancement:   awakenings minimized   family presence promoted   regular sleep/rest pattern promoted   noise level reduced     Problem: Skin Injury Risk Increased  Goal: Skin Health and Integrity  Outcome: Ongoing, Not Progressing  Intervention: Optimize Skin Protection  Flowsheets (Taken 1/28/2023 0907)  Pressure Reduction Techniques:   heels elevated off bed   weight shift assistance provided  Skin Protection:   adhesive use limited   skin-to-device areas padded  Head of Bed (HOB) Positioning: HOB elevated  Intervention: Promote and Optimize Oral Intake  Flowsheets (Taken 1/28/2023 0907)  Oral Nutrition Promotion: rest periods promoted

## 2023-01-28 NOTE — PLAN OF CARE
Problem: Adult Inpatient Plan of Care  Goal: Plan of Care Review  1/27/2023 1830 by Rock Camarillo LPN  Outcome: Ongoing, Progressing  Flowsheets (Taken 1/27/2023 1830)  Plan of Care Reviewed With:   patient   daughter  1/27/2023 1824 by Rock Camarillo LPN  Outcome: Ongoing, Progressing  Flowsheets (Taken 1/27/2023 1824)  Plan of Care Reviewed With:   patient   daughter  Goal: Patient-Specific Goal (Individualized)  1/27/2023 1830 by Rock Camarillo LPN  Outcome: Ongoing, Progressing  Flowsheets (Taken 1/27/2023 1830)  Anxieties, Fears or Concerns: none  Individualized Care Needs: pain management and blood pressure control  1/27/2023 1824 by Rock Camarillo LPN  Outcome: Ongoing, Progressing  Flowsheets (Taken 1/27/2023 1824)  Anxieties, Fears or Concerns: none  Individualized Care Needs: pain management  Goal: Absence of Hospital-Acquired Illness or Injury  1/27/2023 1830 by Rock Camarillo LPN  Outcome: Ongoing, Progressing  1/27/2023 1824 by Rock Camarillo LPN  Outcome: Ongoing, Progressing  Goal: Optimal Comfort and Wellbeing  1/27/2023 1830 by Rock Camarillo LPN  Outcome: Ongoing, Progressing  1/27/2023 1824 by Rock Camarillo LPN  Outcome: Ongoing, Progressing  Goal: Readiness for Transition of Care  1/27/2023 1830 by Rock Camarillo LPN  Outcome: Ongoing, Progressing  1/27/2023 1824 by Rock Camarillo LPN  Outcome: Ongoing, Progressing

## 2023-01-29 NOTE — PT/OT/SLP PROGRESS
Physical Therapy      Patient Name:  Kymberly Franco   MRN:  [id number]    Patient not seen today secondary to Nurse/ CHLOE hold, Unarousable. Pt remains unable to participate with PT evaluation. Will follow-up tomorrow.

## 2023-01-29 NOTE — PT/OT/SLP PROGRESS
Occupational Therapy      Patient Name:  Kymberly Franco   MRN:  [id number]    Patient not seen today secondary to Nurse/ CHLOE hold (Pt is still unresponsive). Will follow-up 1/30/2023.    1/29/2023

## 2023-01-29 NOTE — SUBJECTIVE & OBJECTIVE
Interval History: naeo    Review of Systems   Reason unable to perform ROS: dementia.   Objective:     Vital Signs (Most Recent):  Temp: 98.1 °F (36.7 °C) (01/29/23 0400)  Pulse: 83 (01/29/23 0400)  Resp: 16 (01/29/23 0400)  BP: (!) 143/55 (01/29/23 0400)  SpO2: 97 % (01/29/23 0400)   Vital Signs (24h Range):  Temp:  [98.1 °F (36.7 °C)-98.6 °F (37 °C)] 98.1 °F (36.7 °C)  Pulse:  [70-83] 83  Resp:  [12-16] 16  SpO2:  [96 %-99 %] 97 %  BP: (124-152)/(49-55) 143/55     Weight: 52.2 kg (115 lb)  Body mass index is 21.03 kg/m².    Intake/Output Summary (Last 24 hours) at 1/29/2023 1449  Last data filed at 1/29/2023 0400  Gross per 24 hour   Intake 150 ml   Output 225 ml   Net -75 ml        Physical Exam  Vitals reviewed.   Constitutional:       Appearance: Normal appearance.   HENT:      Head: Normocephalic and atraumatic.   Eyes:      Extraocular Movements: Extraocular movements intact.   Cardiovascular:      Rate and Rhythm: Normal rate and regular rhythm.      Pulses: Normal pulses.   Pulmonary:      Effort: Pulmonary effort is normal.      Breath sounds: Normal breath sounds.   Abdominal:      General: Abdomen is flat.      Palpations: Abdomen is soft.   Musculoskeletal:         General: Tenderness present.   Skin:     General: Skin is warm and dry.      Capillary Refill: Capillary refill takes less than 2 seconds.   Neurological:      General: No focal deficit present.      Mental Status: She is alert.   Psychiatric:         Mood and Affect: Mood normal.         Behavior: Behavior normal.       Significant Labs: All pertinent labs within the past 24 hours have been reviewed.    Significant Imaging: I have reviewed all pertinent imaging results/findings within the past 24 hours.

## 2023-01-29 NOTE — PLAN OF CARE
Problem: Infection  Goal: Absence of Infection Signs and Symptoms  Outcome: Ongoing, Not Progressing     Problem: Adult Inpatient Plan of Care  Goal: Plan of Care Review  Outcome: Ongoing, Not Progressing  Goal: Patient-Specific Goal (Individualized)  Outcome: Ongoing, Not Progressing  Goal: Absence of Hospital-Acquired Illness or Injury  Outcome: Ongoing, Not Progressing  Goal: Optimal Comfort and Wellbeing  Outcome: Ongoing, Not Progressing  Goal: Readiness for Transition of Care  Outcome: Ongoing, Not Progressing     Problem: Impaired Wound Healing  Goal: Optimal Wound Healing  Outcome: Ongoing, Not Progressing     Problem: Skin Injury Risk Increased  Goal: Skin Health and Integrity  Outcome: Ongoing, Not Progressing

## 2023-01-29 NOTE — NURSING
Pt was unresponsive yesterday (01/28/2023); today, she scrunches her eyes and grunts when performing mouth care on her.  She has opened her eyes a few times today when speaking to her and rubbing her shoulder.

## 2023-01-29 NOTE — PROGRESS NOTES
[facility]  [facility] Medicine  Progress Note    Patient Name: Kymberly Franco  MRN: [id number]  Patient Class: IP- Inpatient   Admission Date: 1/26/2023  Length of Stay: 3 days  Attending Physician: Tarun Deluna DO  Primary Care Provider: Andry Galarza MD        Subjective:     Principal Problem:Intertrochanteric fracture of left femur        HPI:  89 yo F presents to the ED after an accidental fall at home.  She hit her head and has LLE pain.  She is on eliquis for CAF and head CT negative for hemorrhage but xray of left hip and pelvis showed an intertrochanteric fracture.  Dr. Cheung has been following her after a fall and suffering a right humeral  fracture.  Her daughter, Stephanie Ramos who works in [employer] here at [facility], says that she has a very unsteady gait and fall frequently.  She did not have focal weakness or episode of syncope.      Patient is very [place] and has some mild dementia.  Her daughter does not report any complaints of CP, SOB, palpitations or N/V/D.      Overview/Hospital Course:  1/27-no complaints today. NPO for OR  1/28- the patient underwent repair of left femur fracture yesterday, which went well.  She seems to have an acute mental status change this a.m. being very hard to arouse and elicit a response.  An RRT was called on the patient's a.m..  Chest x-ray shows left lower lobe infiltrate possible pneumonia.  The patient had sedation for surgery as well as trazodone last night.  Possible lingering affects.  Added metronidazole with ceftriaxone for possible aspiration pneumonia.  Patient has a penicillin allergy.  1/29- patient was seen examined today resting comfortably in bed, in no acute distress.  She continues to be very somnolent.  Family states she still has not woken up very much and has not been awake enough to eat or drink over the past 24 hours.  However, she is more arousable today and will open her eyes on command and will murmur when spoken to.   This is an improvement over yesterday's exam.  Ammonia level was negative yesterday.  Patient on appropriate antibiotics for aspiration pneumonia with a penicillin allergy.  Today, we will change her fluids and increase the rate slightly.  Patient is slowly improving.       Interval History: naeo    Review of Systems   Reason unable to perform ROS: dementia.   Objective:     Vital Signs (Most Recent):  Temp: 98.1 °F (36.7 °C) (01/29/23 0400)  Pulse: 83 (01/29/23 0400)  Resp: 16 (01/29/23 0400)  BP: (!) 143/55 (01/29/23 0400)  SpO2: 97 % (01/29/23 0400)   Vital Signs (24h Range):  Temp:  [98.1 °F (36.7 °C)-98.6 °F (37 °C)] 98.1 °F (36.7 °C)  Pulse:  [70-83] 83  Resp:  [12-16] 16  SpO2:  [96 %-99 %] 97 %  BP: (124-152)/(49-55) 143/55     Weight: 52.2 kg (115 lb)  Body mass index is 21.03 kg/m².    Intake/Output Summary (Last 24 hours) at 1/29/2023 1449  Last data filed at 1/29/2023 0400  Gross per 24 hour   Intake 150 ml   Output 225 ml   Net -75 ml        Physical Exam  Vitals reviewed.   Constitutional:       Appearance:  Very somnolent with eyes closed.    HENT:      Head: Normocephalic and atraumatic.   Eyes:     Pupils are equal, round and reactive to light.  Cardiovascular:      Rate and Rhythm: Normal rate and regular rhythm.      Pulses: Normal pulses.   Pulmonary:      Effort: Pulmonary effort is normal.      Breath sounds: Normal breath sounds.   Abdominal:      General: Abdomen is flat.      Palpations: Abdomen is soft.   Musculoskeletal:         General: Tenderness present.   Skin:     General: Skin is warm and dry.      Capillary Refill: Capillary refill takes less than 2 seconds.   Neurological:      General: No focal deficit present.      Mental Status: She is alert.   Psychiatric:         Patient is very somnolent and slow to respond.  Responding only with moaning and grunting.      Significant Labs: All pertinent labs within the past 24 hours have been reviewed.    Significant Imaging: I have  reviewed all pertinent imaging results/findings within the past 24 hours.      Assessment/Plan:      Intertrochanteric fracture of left femur  Patient day 2 postop.  No complaints of pain.   Patient remains very somnolent and slow to respond, but improving daily.     Alzheimer's dementia with anxiety  Continue home valium prn nightly.  Will hold until mental status improve  Continue aricept and namenda combination.      GERD (gastroesophageal reflux disease)  Continue IV PPI     Hypertension     Holding oral agents until patient is more awake to swallow.     Mixed hyperlipidemia  Continue home statin.       Paroxysmal atrial fibrillation  Patient with Long standing persistent (>12 months) atrial fibrillation which is controlled currently with Beta Blocker. Patient is currently in atrial fibrillation.[id number] Score: 3. HASBLED Score: 1. Anticoagulation indicated. Anticoagulation done with eliquis      Continue BB and hold anticoagulation due to pending procedure.  .     Thyroid disease  Continue home synthroid.      Aspiration pneumonia   -continue metronidazole IV q.8 hours to ceftriaxone.  Patient has penicillin allergy.  Repeat chest x-ray 48 hours.    Altered mental status   Patient's mentation slightly improved today.    Will continue to hold oral medications or any sedatives.    Change fluids to D5 half-normal saline with 20 KCL at 100 mL an hour.    Repeat labs in a.m..    VTE Risk Mitigation (From admission, onward)         Ordered     enoxaparin injection 40 mg  Daily         01/29/23 1325     IP VTE HIGH RISK PATIENT  Once         01/27/23 1549     Place JETHRO hose  Until discontinued         01/27/23 1549     Place sequential compression device  Until discontinued         01/27/23 1549     Place sequential compression device  Until discontinued         01/26/23 2159                Discharge Planning   CARLITOS:      Code Status: DNR   Is the patient medically ready for discharge?:     Reason for patient still  in hospital (select all that apply): Treatment  Discharge Plan A: Other (AMY Quinones)                  Tarun Deluna DO  Department of Hospital Medicine   [facility]

## 2023-01-30 PROBLEM — G93.41 ENCEPHALOPATHY, METABOLIC: Status: ACTIVE | Noted: 2023-01-01

## 2023-01-30 NOTE — ASSESSMENT & PLAN NOTE
Suspect realted to medications  Stop benzos, minimal narcotics, stop sleep aids  CTH today r/o CVA  ngt if does not wake up to eat

## 2023-01-30 NOTE — CARE UPDATE
Patient following commands.  She does move her left lower extremity toes dorsiflexion plantar flexion.  She has palpable pulse.  Not complaining of tingling.  Does have sensation of foot.  At this time continue with rehab.  She will be 25-50% weight-bearing on left lower extremity.  Patient will need to put the weight down she is 97 she will need assistance with her ambulation.  She is awaiting rehab placement.  DC staples 2 weeks.  Follow-up Dr. Cheung 3-4 weeks.

## 2023-01-30 NOTE — NURSING
"Daughter called out and said "send chaparro I think mom is starting to wake up." Arrived in pt room and she had her eyes open and I spoke and she follow me speaking meaning she turned her head to me. She still is not speaking. Called Dr mckeon to notify him and asked about CT scan per daughters request. Will continue to monitor.  "

## 2023-01-30 NOTE — PT/OT/SLP EVAL
Occupational Therapy   Evaluation    Name: Kymberly Franco  MRN: [id number]  Admitting Diagnosis: Intertrochanteric fracture of left femur  Recent Surgery: Procedure(s) (LRB):  INSERTION, INTRAMEDULLARY THANG, FEMUR, TROCHANTER (Left) 3 Days Post-Op    Recommendations:     Discharge Recommendations: rehabilitation facility, nursing facility, skilled  Discharge Equipment Recommendations:   (to be determined)  Barriers to discharge:       Assessment:     Kymberly Franco is a 89 y.o. female with a medical diagnosis of Intertrochanteric fracture of left femur.  She presents with day 3 post op (L) hip ORIF. Pt has aspiration with decline in alertness post op. Pt is starting to have periods of increased alertness. Performance deficits affecting function: weakness, impaired endurance, impaired self care skills, impaired functional mobility, gait instability, impaired cognition, decreased upper extremity function, decreased lower extremity function, decreased safety awareness, pain, impaired skin, edema, orthopedic precautions.      Rehab Prognosis: Good; patient would benefit from acute skilled OT services to address these deficits and reach maximum level of function.       Plan:     Patient to be seen 5 x/week to address the above listed problems via self-care/home management, therapeutic activities, therapeutic exercises  Plan of Care Expires: 02/27/23  Plan of Care Reviewed with: patient, daughter    Subjective     Chief Complaint: Pt fell at home and suffered (L) hip fx. Pt underwent ORIF 3 days ago and is allowed TTWB to (L) LE.    Patient/Family Comments/goals: Pt will require SB at D/C    Occupational Profile:  Living Environment: Pt lives at home with her daughter and son-in-law  Previous level of function: Pt required extensive assistance with all self-care due to her dementia. Pt primarily transferred bed/chair/ BSC. Pt has transport chair for longer distances. Pt has an personal care aid that comes out 2 x/wk to get  pt into shower on shower chair. Pt was able to feed herself and assist with her grooming with setup and cues. Pt would occasionally try to get up by herself and furniture cruise.   Roles and Routines: Pt assists some with light self-care and with mobility.  Equipment Used at Home: wheelchair, bedside commode, shower chair  Assistance upon Discharge: Pt will likely require staff assistance at swing bed at D/C until she is able transfer with greater ease and safety    Pain/Comfort:  Pain Rating 1: 0/10 (at rest, up to 4/10 with LE movement)  Location - Side 1: Left  Location 1: hip  Pain Addressed 1: Reposition, Cessation of Activity, Nurse notified  Pain Rating Post-Intervention 1: 0/10    Patients cultural, spiritual, Religion conflicts given the current situation: no    Objective:     Communicated with: ELIZABETH Camarillo prior to session.  Patient found supine with peripheral IV, pulse ox (continuous), blood pressure cuff, escobar catheter upon OT entry to room.    General Precautions: Standard, fall  Orthopedic Precautions: LLE toe touch weight bearing  Braces: N/A  Respiratory Status: Nasal cannula, flow 2 L/min    Occupational Performance:    Bed Mobility:    Patient completed Supine to Sit with dependent  Patient completed Sit to Supine with dependent    Functional Mobility/Transfers:  Patient completed Sit <> Stand Transfer with maximal assistance  with  gait belt and manual assistance   Functional Mobility: Pt unable to attempt steps    Activities of Daily Living:  Pt unable to attempt    Cognitive/Visual Perceptual:  Cognitive/Psychosocial Skills:     -       Oriented to: Person   -       Follows Commands/attention:Inattentive  -       Communication: Pt only moaned with LE movement. Otherwise no vocalizations  -       Safety awareness/insight to disability: impaired   -       Mood/Affect/Coping skills/emotional control: Lethargic    Physical Exam:  Balance:    -       poor sitting balance  Postural  examination/scapula alignment:    -       Rounded shoulders  -       Forward head  -       head tilted to (L) and leaning to (R)  Skin integrity: Thin and Dry  Edema:  Moderate (B) UE  Motor Planning:    -       impaired  Dominant hand:    -       Left  Upper Extremity Range of Motion:     -       Right Upper Extremity: PROM is WFL. Pt did not actively move her upper extremity   -       Left Upper Extremity: PROM is WFL, except for hand with several digits with severely impaired movement  Upper Extremity Strength:    -       Right Upper Extremity: unable to test or observe  -       Left Upper Extremity: unable to test or observe  Fine Motor Coordination:    -       Impaired  unable to test or observe  Gross motor coordination:   unable to test or observe    AMPAC 6 Click ADL:  AMPAC Total Score: 12    Treatment & Education:  Pt educated on OT role/POC.   Importance of assisting with self-care activities   All questions/concerns answered within OT scope of practice      Patient left with bed in chair position with all lines intact and call button in reach    GOALS:   Multidisciplinary Problems       Occupational Therapy Goals          Problem: Occupational Therapy    Goal Priority Disciplines Outcome Interventions   Occupational Therapy Goal     OT, PT/OT Ongoing, Progressing    Description: STG:  Pt will perform grooming with setup and mod(A)  Pt will perform UE dressing with max(A)  Pt will sit EOB x 5 min with min(A)  Pt will transfer bed/chair/bsc with max (A)  Pt will tolerate 10 minutes of tx without fatigue      LT.Restore to max I with self care and mobility.                          History:     Past Medical History:   Diagnosis Date    Alzheimer's dementia with anxiety     Anticoagulant long-term use     GERD (gastroesophageal reflux disease)     Hypertension     Mixed hyperlipidemia     Paroxysmal atrial fibrillation     Thyroid disease          Past Surgical History:   Procedure Laterality Date     APPENDECTOMY      INTRAMEDULLARY RODDING OF FEMUR Left 01/27/2023    Dr. Andry Cheung    INTRAMEDULLARY RODDING OF TROCHANTER OF FEMUR Left 1/27/2023    Procedure: INSERTION, INTRAMEDULLARY THANG, FEMUR, TROCHANTER;  Surgeon: Andry Cheung MD;  Location: [facility];  Service: Orthopedics;  Laterality: Left;       Time Tracking:     OT Date of Treatment: 01/30/23  OT Start Time: 0837  OT Stop Time: 0903  OT Total Time (min): 26 min    Billable Minutes:Evaluation low complexity    1/30/2023

## 2023-01-30 NOTE — PLAN OF CARE
Problem: Physical Therapy  Goal: Physical Therapy Goal  Description: Short Term Goals to be met by 2/14/23    Patient will increase functional independence and safety with mobility by performing    1.   Rolling right Moderate Assist; roll left Moderate Assist  2.   Supine to sit Moderate Assist  3.   Sitting balance edge of the bed x 15 minutes contact guard assist  4.   Sit to stand Moderate Assist  using manual assistance with gait belt and TTWB L LE  5.   Bed to chair Moderate Assist using manual assistance with gait belt and TTWB L LE  6.   Lower extremity exercises x 30 reps with assist as necessary and no rest breaks      Long Term Goals to be met by 3/2/23    Patient to require less than or equal to Minimal Assist for functional mobility to ease caregiver burden   Outcome: Ongoing, Progressing     Pt able to open eyes to verbal stimulation and participate in limited PT eval. Pt very far from reported baseline mobility and will require extended rehab to regain baseline. Anticipate swing bed at discharge.

## 2023-01-30 NOTE — PROGRESS NOTES
[facility]  [facility] Medicine  Progress Note    Patient Name: Kymberly Franco  MRN: [id number]  Patient Class: IP- Inpatient   Admission Date: 1/26/2023  Length of Stay: 4 days  Attending Physician: Juanito Huggins DO  Primary Care Provider: Andry Galarza MD        Subjective:     Principal Problem:Intertrochanteric fracture of left femur        HPI:  89 yo F presents to the ED after an accidental fall at home.  She hit her head and has LLE pain.  She is on eliquis for CAF and head CT negative for hemorrhage but xray of left hip and pelvis showed an intertrochanteric fracture.  Dr. Cheung has been following her after a fall and suffering a right humeral  fracture.  Her daughter, Stephanie Ramos who works in [employer] here at [facility], says that she has a very unsteady gait and fall frequently.  She did not have focal weakness or episode of syncope.      Patient is very [place] and has some mild dementia.  Her daughter does not report any complaints of CP, SOB, palpitations or N/V/D.      Overview/Hospital Course:  1/27-no complaints today. NPO for OR  1/28- the patient underwent repair of left femur fracture yesterday, which went well.  She seems to have an acute mental status change this a.m. being very hard to arouse and elicit a response.  An RRT was called on the patient's a.m..  Chest x-ray shows left lower lobe infiltrate possible pneumonia.  The patient had sedation for surgery as well as trazodone last night.  Possible lingering affects.  Added metronidazole with ceftriaxone for possible aspiration pneumonia.  Patient has a penicillin allergy.  1/29- patient was seen examined today resting comfortably in bed, in no acute distress.  She continues to be very somnolent.  Family states she still has not woken up very much and has not been awake enough to eat or drink over the past 24 hours.  However, she is more arousable today and will open her eyes on command and will murmur when spoken to.   This is an improvement over yesterday's exam.  Ammonia level was negative yesterday.  Patient on appropriate antibiotics for aspiration pneumonia with a penicillin allergy.  Today, we will change her fluids and increase the rate slightly.  Patient is slowly improving.   1/30-CTH today for AMS.  Suspect this is related to medication.  MAYET as well.  Will increase IVF as she is not eating well.  If she does not awaken soon will need NGT for enteral feeding.      Interval History: naeo    Review of Systems   Reason unable to perform ROS: dementia.   Objective:     Vital Signs (Most Recent):  Temp: 99.6 °F (37.6 °C) (01/30/23 0700)  Pulse: (!) 122 (01/30/23 0700)  Resp: 20 (01/30/23 0700)  BP: (!) 167/79 (01/30/23 0700)  SpO2: 95 % (01/30/23 0700)   Vital Signs (24h Range):  Temp:  [97.5 °F (36.4 °C)-99.6 °F (37.6 °C)] 99.6 °F (37.6 °C)  Pulse:  [104-197] 122  Resp:  [14-20] 20  SpO2:  [72 %-95 %] 95 %  BP: (126-167)/(53-84) 167/79     Weight: 52.2 kg (115 lb 1.3 oz)  Body mass index is 21.05 kg/m².    Intake/Output Summary (Last 24 hours) at 1/30/2023 1126  Last data filed at 1/29/2023 2335  Gross per 24 hour   Intake 50 ml   Output 625 ml   Net -575 ml      Physical Exam  Vitals reviewed.   Constitutional:       Appearance: Normal appearance.   HENT:      Head: Normocephalic and atraumatic.   Eyes:      Extraocular Movements: Extraocular movements intact.   Cardiovascular:      Rate and Rhythm: Normal rate and regular rhythm.      Pulses: Normal pulses.   Pulmonary:      Effort: Pulmonary effort is normal.      Breath sounds: Normal breath sounds.   Abdominal:      General: Abdomen is flat.      Palpations: Abdomen is soft.   Musculoskeletal:         General: Tenderness present.   Skin:     General: Skin is warm and dry.      Capillary Refill: Capillary refill takes less than 2 seconds.   Neurological:      General: No focal deficit present.      Mental Status: She is alert.   Psychiatric:         Mood and Affect: Mood  normal.         Behavior: Behavior normal.       Significant Labs: All pertinent labs within the past 24 hours have been reviewed.    Significant Imaging: I have reviewed all pertinent imaging results/findings within the past 24 hours.      Assessment/Plan:      * Intertrochanteric fracture of left femur  Patient is on eliquis and recommend at least 24h off before surgery.    I see no medical reason why patient cannot proceed to surgery.     Per AHA RCRI she has a less than 2.5% change of perioperative cardiac risk.    High risk is greater than 1%. Patient is not in pulmonary edema or with unstable angina or valvular hear disease.   Agree with pain control and rehab. Will place 5 lbs Worrell's traction.    Will place escobar and check UA and treat any bacteruria.  Will treat if clinically indicated.        Encephalopathy, metabolic  Suspect realted to medications  Stop benzos, minimal narcotics, stop sleep aids  CTH today r/o CVA  ngt if does not wake up to eat      Fall        Alzheimer's dementia with anxiety    Continue aricept and namenda combination.       GERD (gastroesophageal reflux disease)  Continue PPI      Hypertension    Continue home antihypertensive agents    Mixed hyperlipidemia  Continue home statin.        Paroxysmal atrial fibrillation  Patient with Long standing persistent (>12 months) atrial fibrillation which is controlled currently with Beta Blocker. Patient is currently in atrial fibrillation.[id number] Score: 3. HASBLED Score: 1. Anticoagulation indicated. Anticoagulation done with eliquis     Continue BB and hold anticoagulation due to pending procedure.  .        Thyroid disease  Continue home synthroid.         VTE Risk Mitigation (From admission, onward)         Ordered     enoxaparin injection 40 mg  Daily         01/29/23 1325     IP VTE HIGH RISK PATIENT  Once         01/27/23 1549     Place JETHRO hose  Until discontinued         01/27/23 1549     Place sequential compression device  Until  discontinued         01/27/23 1549     Place sequential compression device  Until discontinued         01/26/23 2159                Discharge Planning   CARLITOS:      Code Status: DNR   Is the patient medically ready for discharge?:     Reason for patient still in hospital (select all that apply): Treatment  Discharge Plan A: Other (AMY Quinones)                  Juanito Huggins DO  Department of Hospital Medicine   [facility]

## 2023-01-30 NOTE — ASSESSMENT & PLAN NOTE
Patient with Long standing persistent (>12 months) atrial fibrillation which is controlled currently with Beta Blocker. Patient is currently in atrial fibrillation.[id number] Score: 3. HASBLED Score: 1. Anticoagulation indicated. Anticoagulation done with eliquis     Continue BB and hold anticoagulation due to pending procedure.  .

## 2023-01-30 NOTE — PLAN OF CARE
Problem: Occupational Therapy  Goal: Occupational Therapy Goal  Description: STG:  Pt will perform grooming with setup and mod(A)  Pt will perform UE dressing with max(A)  Pt will sit EOB x 5 min with min(A)  Pt will transfer bed/chair/bsc with max (A)  Pt will tolerate 10 minutes of tx without fatigue      LT.Restore to max I with self care and mobility.     Outcome: Ongoing, Progressing

## 2023-01-30 NOTE — PT/OT/SLP EVAL
Physical Therapy Evaluation    Patient Name:  Kymberly Franco   MRN:  [id number]    Recommendations:     Discharge Recommendations: nursing facility, skilled, rehabilitation facility   Discharge Equipment Recommendations: other (see comments) (to be determined)   Barriers to discharge:  Ongoing medical treatment    Assessment:     Kymberly Franco is a 89 y.o. female admitted with a medical diagnosis of Intertrochanteric fracture of left femur, possible aspiration post op with decreased responsiveness.  She presents with the following impairments/functional limitations: weakness, impaired endurance, impaired self care skills, impaired functional mobility, impaired balance, impaired cognition, decreased upper extremity function, decreased lower extremity function, decreased safety awareness, pain, orthopedic precautions, impaired skin, edema.    Pt able to open eyes to verbal stimulation and participate in limited PT eval. Pt very far from reported baseline mobility and will require extended rehab to regain baseline. Anticipate swing bed at discharge.  Rehab Prognosis: Fair; patient would benefit from acute skilled PT services to address these deficits and reach maximum level of function.    Recent Surgery: Procedure(s) (LRB):  INSERTION, INTRAMEDULLARY THANG, FEMUR, TROCHANTER (Left) 3 Days Post-Op    Plan:     During this hospitalization, patient to be seen daily to address the identified rehab impairments via gait training, therapeutic activities, therapeutic exercises, neuromuscular re-education and progress toward the following goals:    Plan of Care Expires:  03/02/23    Subjective     Chief Complaint: Intertrochanteric fracture of left femur  Patient/Family Comments/goals: Pt was unable to speech but family member provided information.   Pain/Comfort:  Pain Rating 1: 0/10 (5/10 Kwadwo Torres with any movement)  Location - Side 1: Left  Location 1: hip  Pain Rating Post-Intervention 1: 0/10    Patients cultural,  spiritual, [place]ism conflicts given the current situation: no    Living Environment:  Pt lives in a single story home with daughter with no steps to enter.   Prior to admission, patients level of function was dependent on her daughter for care. Pt is primarily chair bound but will occasionally furniture cruise. Family aids pt with transfers from bed to bedside commode.  Equipment used at home: wheelchair, walker, rolling, bedside commode, shower chair.  DME owned (not currently used): none.  Upon discharge, patient will have assistance from swing bed staff.    Objective:     Communicated with Rock Camarillo LPN prior to session.  Patient found supine with peripheral IV, pulse ox (continuous), blood pressure cuff, escobar catheter  upon PT entry to room.    General Precautions: Standard, fall  Orthopedic Precautions:LLE toe touch weight bearing   Braces: N/A  Respiratory Status: Room air    Exams:  Cognitive Exam:  Patient opened eyes on verbalizing her name and perform brief visual tracking. Pt unable to follow commands.   Postural Exam:  Patient presented with the following abnormalities:    -       Kyphosis  -       Head and neck: side flexed right, rotated left (chronic positioning per daughter)  Sensation: Pt unable to participate in selective testing but was aware of B LE  Skin Integrity/Edema:      -       Edema: Moderate B UE  RLE ROM: 90/90 sitting EOB, Ankle DF to neutral   RLE Strength: Unable to participate with MMT but demonstrated spontaneous LE activation during standing trail   LLE ROM: 90/90 sitting EOB, Ankle DF to neutral  LLE Strength: Unable to generate LE activation due to pain    Functional Mobility:  Bed Mobility:     Supine to Sit: dependence  Sit to Supine: dependence  Transfers:     Sit to Stand:  maximal assistance with manual assist via gait belt with spontaneous LE muscle activation; TTWB L LE      AM-PAC 6 CLICK MOBILITY  Total Score:8       Treatment & Education:  Pt and daughter  educated on PT role/POC and TTWB restriction.   Importance of OOB activity with staff assistance.  Importance of sitting up in the chair throughout the day as tolerated, especially for meals   Safety during functional t/f and mobility  White board updated   Multiple self-care tasks/functional mobility completed- assistance level noted above   All questions/concerns answered within PT scope of practice     Patient left supine with all lines intact, call button in reach, Rock Camarillo LPN notified, and daughter present.    GOALS:   Multidisciplinary Problems       Physical Therapy Goals          Problem: Physical Therapy    Goal Priority Disciplines Outcome Goal Variances Interventions   Physical Therapy Goal     PT, PT/OT Ongoing, Progressing     Description: Short Term Goals to be met by 2/14/23    Patient will increase functional independence and safety with mobility by performing    1.   Rolling right Moderate Assist; roll left Moderate Assist  2.   Supine to sit Moderate Assist  3.   Sitting balance edge of the bed x 15 minutes contact guard assist  4.   Sit to stand Moderate Assist  using manual assistance with gait belt and TTWB L LE  5.   Bed to chair Moderate Assist using manual assistance with gait belt and TTWB L LE  6.   Lower extremity exercises x 30 reps with assist as necessary and no rest breaks      Long Term Goals to be met by 3/2/23    Patient to require less than or equal to Minimal Assist for functional mobility to ease caregiver burden                        History:     Past Medical History:   Diagnosis Date    Alzheimer's dementia with anxiety     Anticoagulant long-term use     GERD (gastroesophageal reflux disease)     Hypertension     Mixed hyperlipidemia     Paroxysmal atrial fibrillation     Thyroid disease        Past Surgical History:   Procedure Laterality Date    APPENDECTOMY      INTRAMEDULLARY RODDING OF FEMUR Left 01/27/2023    Dr. Andry Cheung    INTRAMEDULLARY RODDING OF  TROCHANTER OF FEMUR Left 1/27/2023    Procedure: INSERTION, INTRAMEDULLARY THANG, FEMUR, TROCHANTER;  Surgeon: Andry Cheung MD;  Location: [facility];  Service: Orthopedics;  Laterality: Left;       Time Tracking:     PT Received On: 01/30/23  PT Start Time: 0842     PT Stop Time: 0902  PT Total Time (min): 20 min     Billable Minutes: Evaluation mod complexity      01/30/2023

## 2023-01-30 NOTE — SUBJECTIVE & OBJECTIVE
Interval History: naeo    Review of Systems   Reason unable to perform ROS: dementia.   Objective:     Vital Signs (Most Recent):  Temp: 99.6 °F (37.6 °C) (01/30/23 0700)  Pulse: (!) 122 (01/30/23 0700)  Resp: 20 (01/30/23 0700)  BP: (!) 167/79 (01/30/23 0700)  SpO2: 95 % (01/30/23 0700)   Vital Signs (24h Range):  Temp:  [97.5 °F (36.4 °C)-99.6 °F (37.6 °C)] 99.6 °F (37.6 °C)  Pulse:  [104-197] 122  Resp:  [14-20] 20  SpO2:  [72 %-95 %] 95 %  BP: (126-167)/(53-84) 167/79     Weight: 52.2 kg (115 lb 1.3 oz)  Body mass index is 21.05 kg/m².    Intake/Output Summary (Last 24 hours) at 1/30/2023 1126  Last data filed at 1/29/2023 2335  Gross per 24 hour   Intake 50 ml   Output 625 ml   Net -575 ml      Physical Exam  Vitals reviewed.   Constitutional:       Appearance: Normal appearance.   HENT:      Head: Normocephalic and atraumatic.   Eyes:      Extraocular Movements: Extraocular movements intact.   Cardiovascular:      Rate and Rhythm: Normal rate and regular rhythm.      Pulses: Normal pulses.   Pulmonary:      Effort: Pulmonary effort is normal.      Breath sounds: Normal breath sounds.   Abdominal:      General: Abdomen is flat.      Palpations: Abdomen is soft.   Musculoskeletal:         General: Tenderness present.   Skin:     General: Skin is warm and dry.      Capillary Refill: Capillary refill takes less than 2 seconds.   Neurological:      General: No focal deficit present.      Mental Status: She is alert.   Psychiatric:         Mood and Affect: Mood normal.         Behavior: Behavior normal.       Significant Labs: All pertinent labs within the past 24 hours have been reviewed.    Significant Imaging: I have reviewed all pertinent imaging results/findings within the past 24 hours.

## 2023-01-31 NOTE — PLAN OF CARE
Problem: Skin Injury Risk Increased  Goal: Skin Health and Integrity  Outcome: Ongoing, Progressing  Intervention: Optimize Skin Protection  Flowsheets (Taken 1/31/2023 1651)  Pressure Reduction Techniques:   heels elevated off bed   positioned off wounds   pressure points protected   weight shift assistance provided  Skin Protection: adhesive use limited  Head of Bed (HOB) Positioning: HOB elevated

## 2023-01-31 NOTE — PT/OT/SLP PROGRESS
"Physical Therapy Treatment    Patient Name:  Kymberyl Franco   MRN:  [id number]    Recommendations:     Discharge Recommendations: nursing facility, skilled, rehabilitation facility  Discharge Equipment Recommendations: other (see comments) (to be determined)  Barriers to discharge:  Ongoing medical treatment    Assessment:     Kymberly Franco is a 89 y.o. female admitted with a medical diagnosis of Intertrochanteric fracture of left femur.  She presents with the following impairments/functional limitations: impaired endurance, weakness, impaired self care skills, impaired functional mobility, gait instability, impaired balance, impaired cognition, decreased upper extremity function, decreased lower extremity function, decreased safety awareness, impaired cardiopulmonary response to activity.    Pt demonstrated increased alertness compared with last treatment but was still lethargic for most of treatment. Pt able to demonstrate minimal spontaneous muscle activation but still required max A to dependence with bed mobility and transfers. Pt will benefit from swing bed once medically stable to increase functional mobility and caregiver burden.     Rehab Prognosis: Fair; patient would benefit from acute skilled PT services to address these deficits and reach maximum level of function.    Recent Surgery: Procedure(s) (LRB):  INSERTION, INTRAMEDULLARY THANG, FEMUR, TROCHANTER (Left) 4 Days Post-Op    Plan:     During this hospitalization, patient to be seen 5 x/week to address the identified rehab impairments via gait training, therapeutic activities, therapeutic exercises, neuromuscular re-education and progress toward the following goals:    Plan of Care Expires:  03/02/23    Subjective     Chief Complaint: Intertrochanteric fracture of L femur  Patient/Family Comments/goals: Pt's daughter states she is more alert today than previous days."  Pain/Comfort:  Pain Rating 1: 0/10  Pain Rating Post-Intervention 1: " 0/10      Objective:     Communicated with Sherry Breen LPN prior to session.  Patient found supine with peripheral IV, pulse ox (continuous), blood pressure cuff, escobar catheter, oxygen upon PT entry to room.     General Precautions: Standard, fall  Orthopedic Precautions: LLE toe touch weight bearing  Braces: N/A  Respiratory Status: Nasal cannula, flow 2 L/min     Functional Mobility:  Bed Mobility:     Supine to Sit: maximal assistance and of 2 persons; improved trunk activation  Sit to Supine: dependence  Transfers:     Sit to Stand:  maximal assistance with manual assist via gait belt x 2 trials for hygiene and linen change.       AM-PAC 6 CLICK MOBILITY  Turning over in bed (including adjusting bedclothes, sheets and blankets)?: 1  Sitting down on and standing up from a chair with arms (e.g., wheelchair, bedside commode, etc.): 2  Moving from lying on back to sitting on the side of the bed?: 2  Moving to and from a bed to a chair (including a wheelchair)?: 1  Need to walk in hospital room?: 1  Climbing 3-5 steps with a railing?: 1  Basic Mobility Total Score: 8       Treatment & Education:  Transfers as above.  Bilateral lower extremity exercise x 30 reps: ankle pumps, heel slides, hip abduction/adduction, and straight leg raises with  PROM and minimal spontaneous activation of ankles during ankle pumps.      Sitting EOB x 15 minutes with focus on midline stability; min to max A. Increasing assist as pt fatigued    Patient left supine with all lines intact, call button in reach, Sherry Breen LPN notified, and daughter present..    GOALS:   Multidisciplinary Problems       Physical Therapy Goals          Problem: Physical Therapy    Goal Priority Disciplines Outcome Goal Variances Interventions   Physical Therapy Goal     PT, PT/OT Ongoing, Progressing     Description: Short Term Goals to be met by 2/14/23    Patient will increase functional independence and safety with mobility by performing    1.    Rolling right Moderate Assist; roll left Moderate Assist  2.   Supine to sit Moderate Assist  3.   Sitting balance edge of the bed x 15 minutes contact guard assist  4.   Sit to stand Moderate Assist  using manual assistance with gait belt and TTWB L LE  5.   Bed to chair Moderate Assist using manual assistance with gait belt and TTWB L LE  6.   Lower extremity exercises x 30 reps with assist as necessary and no rest breaks      Long Term Goals to be met by 3/2/23    Patient to require less than or equal to Minimal Assist for functional mobility to ease caregiver burden                        Time Tracking:     PT Received On: 01/31/23  PT Start Time: 0833     PT Stop Time: 0856  PT Total Time (min): 23 min     Billable Minutes: Therapeutic Activity 15 minutes    Treatment Type: Treatment  PT/PTA: PT     PTA Visit Number: 0     01/31/2023

## 2023-01-31 NOTE — ASSESSMENT & PLAN NOTE
Suspect realted to medications  Stop benzos, minimal narcotics, stop sleep aids    Questionable spot on CTH, but patient more awake. Hold on MRI for now  NGT

## 2023-01-31 NOTE — PLAN OF CARE
Per Dr Huggins, pt is not ready for discharge at this time. Will send updates to [facility] when pt is closer to being ready for discharge.

## 2023-01-31 NOTE — ASSESSMENT & PLAN NOTE
Patient with Long standing persistent (>12 months) atrial fibrillation which is controlled currently with Beta Blocker. Patient is currently in atrial fibrillation.[id number] Score: 3. HASBLED Score: 1. Anticoagulation indicated. Anticoagulation done with eliquis     Eliquis dilt

## 2023-01-31 NOTE — CONSULTS
[facility]  Adult Nutrition  Consult Note         Reason for Assessment  Reason For Assessment: consult   Nutrition Risk Screen: tube feeding or parenteral nutrition     Consult received and appreciated.   Recommend Jevity 1.5@ 50ml/h with 25ml free water flush. Will provide 1512 cals, 64g pro and 1512ml free water.    Per MD notes:   1/30-CTH today for AMS.  Suspect this is related to medication.  MAYTE as well.  Will increase IVF as she is not eating well.  If she does not awaken soon will need NGT for enteral feeding.  1/31-more awake today, still not eating. Will place NGT    Malnutrition  Is Patient Malnourished: No  Skin Integrity  Donnie Risk Assessment  Sensory Perception: 2-->very limited  Moisture: 4-->rarely moist  Activity: 1-->bedfast  Mobility: 1-->completely immobile  Nutrition: 1-->very poor  Friction and Shear: 2-->potential problem  Donnie Score: 11    Nutrition Diagnosis  Inadequate energy intake   related to Appetite loss as evidenced by requiring NG feedings to maintain nutritional status    Nutrition Diagnosis Status: Chronic/ continues      Nutrition Risk  Level of Risk/Frequency of Follow-up: high  Comments on nutrition risk: NG feeds   Recent Labs   Lab 01/29/23  1815 01/31/23  0811   GLU  --  115*   POCGLU 112*  --      Comments on Glucose: no dx of diabetes  Nutrition Prescription / Recommendations  Recommendation/Intervention: Recommend Jevity 1.5@ 50ml/h with 25ml free water flush. Will provide 1512 cals, 64g pro and 1512ml free water.  Goals: weight maintenance, tolerance of tube feeds  Nutrition Goal Status: new  Communication of GRACIELA Recs: reviewed with physician  Current Diet Order: cardiac diet  Chewing or Swallowing Difficulty?: No Chewing or swallowing difficulty/altered mental status  Recommended Diet: Enteral Nutrition  Recommended Oral Supplement: No Oral Supplements  Is Nutrition Support Recommended: Yes   Needs Calculated  Energy Need Method: Kcal/kg  Energy Calorie Requirements (kcal): 7982-4072  Protein Requirements: 52-63  Enteral Nutrition   Enteral Nutrition Formula Provides:  1512 kcals Propofol Rate: No  64 g Protein  912 ml Fluid without Flush    600 ml Fluid by flush   1512 ml Total Fluid  Enteral Nutrition Recommended Order:  Tube feeding via NG/ Dobhoff  Tube feeding formula: Jevity 1.5 Continuous 42 ml/h  Free Water Flush: 25 ml hourly  Modular Supplements:No Modular Supplements needed  Enteral Nutrition meets needs?: yes  Enteral Nutrition Status: Recommended but Not Ordered    Is Education Recommended: No  Monitor and Evaluation  % current Intake: New Enteral Nutrition Order with no documentation   % intake to meet estimated needs: Enteral Nutrition   Food and Nutrient Intake: enteral nutrition intake  Food and Nutrient Adminstration: enteral and parenteral nutrition administration  Anthropometric Measurements: weight, weight change, body mass index  Biochemical Data, Medical Tests and Procedures: electrolyte and renal panel, lipid profile, gastrointestinal profile, glucose/endocrine profile, inflammatory profile  Nutrition-Focused Physical Findings: overall appearance, extremities, muscles and bones, head and eyes, skin     Current Medical Diagnosis and Past Medical History     Past Medical History:   Diagnosis Date    Alzheimer's dementia with anxiety     Anticoagulant long-term use     GERD (gastroesophageal reflux disease)     Hypertension     Mixed hyperlipidemia     Paroxysmal atrial fibrillation     Thyroid disease      Nutrition/Diet History  Spiritual, Cultural Beliefs, Orthodoxy Practices, Values that Affect Care: no  Food Allergies: NKFA  Factors Affecting Nutritional Intake: decreased appetite  Lab/Procedures/Meds  Recent Labs   Lab 01/29/23  0833 01/31/23  0811    149*   K 3.8 3.9   BUN 43* 40*   CREATININE 2.49* 1.50*   CALCIUM 7.4* 7.6*   ALBUMIN 2.1*  --    * 117*   ALT 12*  --    AST 39*  --      Last A1c: No  "results found for: HGBA1C  Lab Results   Component Value Date    RBC 2.89 (L) 01/29/2023    HGB 8.3 (L) 01/29/2023    HCT 27.1 (L) 01/29/2023    MCV 93.8 01/29/2023    MCH 28.7 01/29/2023    MCHC 30.6 (L) 01/29/2023     Pertinent Labs Reviewed: reviewed  Pertinent Labs Comments: Sodium: 149 (H)  Potassium: 3.9  Chloride: 117 (H)  CO2: 20 (L)  Anion Gap: 16  BUN: 40 (H)  Creatinine: 1.50 (H)  BUN/CREAT RATIO: 27 (H)  eGFR: 32 (L)  Glucose: 115 (H)  Calcium: 7.6 (L)      (H): Data is abnormally high  (L): Data is abnormally low  Pertinent Medications Reviewed: reviewed  Anthropometrics  Temp: 99.6 °F (37.6 °C)  Height Method: Estimated  Height: 5' 2" (157.5 cm)  Height (inches): 62 in  Weight Method: Bed Scale  Weight: 52.2 kg (115 lb 1.3 oz)  Weight (lb): 115.08 lb  Ideal Body Weight (IBW), Female: 110 lb  % Ideal Body Weight, Female (lb): 104.55 %  BMI (Calculated): 21  BMI Grade: 18.5-24.9 - normal     Estimated/Assessed Needs      Temp: 99.6 °F (37.6 °C)Axillary  Weight Used For Calorie Calculations: 52.2 kg (115 lb 1.3 oz)   Energy Need Method: Kcal/kg Energy Calorie Requirements (kcal): 0975-8864  Weight Used For Protein Calculations: 52.2 kg (115 lb 1.3 oz)  Protein Requirements: 52-63  Estimated Fluid Requirement Method: RDA Method    RDA Method (mL): 1305     Nutrition by Nursing  Diet/Nutrition Received:  (unresponsive, no intake at all)  Intake (%): 0%     Diet/Feeding Tolerance: unable to eat  Last Bowel Movement: 01/26/23              Nutrition Follow-Up  RD Follow-up?: Yes    "

## 2023-01-31 NOTE — PT/OT/SLP PROGRESS
Occupational Therapy   Treatment    Name: Kymberly Franco  MRN: [id number]  Admitting Diagnosis:  Intertrochanteric fracture of left femur  4 Days Post-Op    Recommendations:     Discharge Recommendations: nursing facility, skilled, rehabilitation facility  Discharge Equipment Recommendations:   (to be determined)  Barriers to discharge:       Assessment:     Kymberly Franco is a 89 y.o. female with a medical diagnosis of Intertrochanteric fracture of left femur.  She presents with lethargy initially but improved alertness with stimulation and sitting up. Pt was better able to assist with sitting today. Performance deficits affecting function are impaired endurance, impaired self care skills, impaired functional mobility, impaired cognition, impaired cardiopulmonary response to activity, impaired coordination, impaired skin, decreased lower extremity function, decreased upper extremity function, decreased safety awareness, decreased ROM.     Rehab Prognosis:  Good; patient would benefit from acute skilled OT services to address these deficits and reach maximum level of function.       Plan:     Patient to be seen 5 x/week to address the above listed problems via self-care/home management, therapeutic activities, therapeutic exercises  Plan of Care Expires: 02/27/23  Plan of Care Reviewed with: patient    Subjective     Pain/Comfort:  Pain Rating 1: 0/10 (increased to 4/10 with movement)  Location - Side 1: Left  Location 1: hip  Pain Addressed 1: Reposition, Cessation of Activity, Nurse notified  Pain Rating Post-Intervention 1: 0/10    Objective:     Communicated with: ALE Breen prior to session.  Patient found HOB elevated with peripheral IV, blood pressure cuff, escobar catheter, pulse ox (continuous), oxygen upon OT entry to room.    General Precautions: Standard, fall    Orthopedic Precautions:LLE toe touch weight bearing  Braces: N/A  Respiratory Status: Nasal cannula, flow 2 L/min     Occupational  Performance:     Bed Mobility:    Patient completed Supine to Sit with maximal assistance and 2 persons  Patient completed Sit to Supine with total assistance and 2 persons     Functional Mobility/Transfers:  Patient completed Sit <> Stand Transfer with maximal assistance  with  gait belt and manual assistance   Functional Mobility: NT    Activities of Daily Living:  Bathing: total assistance Pt was washed while sitting up EOB with mod(A) for trunk control and balance.  Upper Body Dressing: total assistance to don gown      AMPAC 6 Click ADL: 7    Treatment & Education:  Pt sat EOB x 15 min with mod(A) to max(A) with episodes of min(A). Pt requiring increased assistance with fatigue. Pt did not attempt to use (B) UE for support while sitting.  PROM to (B) UE after pt returned to bed x 10 reps through each plane of movement.    Patient left HOB elevated with all lines intact, call button in reach, bed alarm on, LPN Bibi notified, and daughter present    GOALS:   Multidisciplinary Problems       Occupational Therapy Goals          Problem: Occupational Therapy    Goal Priority Disciplines Outcome Interventions   Occupational Therapy Goal     OT, PT/OT Ongoing, Progressing    Description: STG:  Pt will perform grooming with setup and mod(A)  Pt will perform UE dressing with max(A)  Pt will sit EOB x 5 min with min(A)  Pt will transfer bed/chair/bsc with max (A)  Pt will tolerate 10 minutes of tx without fatigue      LT.Restore to max I with self care and mobility.                          Time Tracking:     OT Date of Treatment: 23  OT Start Time: 833  OT Stop Time: 911  OT Total Time (min): 38 min    Billable Minutes:Self Care/Home Management 20 min    OT/YELENA: OT          2023

## 2023-01-31 NOTE — PROGRESS NOTES
[facility]  [facility] Medicine  Progress Note    Patient Name: Kymberly Franco  MRN: [id number]  Patient Class: IP- Inpatient   Admission Date: 1/26/2023  Length of Stay: 5 days  Attending Physician: Juanito Huggins DO  Primary Care Provider: Andry Galarza MD        Subjective:     Principal Problem:Intertrochanteric fracture of left femur        HPI:  89 yo F presents to the ED after an accidental fall at home.  She hit her head and has LLE pain.  She is on eliquis for CAF and head CT negative for hemorrhage but xray of left hip and pelvis showed an intertrochanteric fracture.  Dr. Cheung has been following her after a fall and suffering a right humeral  fracture.  Her daughter, Stephanie Ramos who works in [employer] here at [facility], says that she has a very unsteady gait and fall frequently.  She did not have focal weakness or episode of syncope.      Patient is very [place] and has some mild dementia.  Her daughter does not report any complaints of CP, SOB, palpitations or N/V/D.      Overview/Hospital Course:  1/27-no complaints today. NPO for OR  1/28- the patient underwent repair of left femur fracture yesterday, which went well.  She seems to have an acute mental status change this a.m. being very hard to arouse and elicit a response.  An RRT was called on the patient's a.m..  Chest x-ray shows left lower lobe infiltrate possible pneumonia.  The patient had sedation for surgery as well as trazodone last night.  Possible lingering affects.  Added metronidazole with ceftriaxone for possible aspiration pneumonia.  Patient has a penicillin allergy.  1/29- patient was seen examined today resting comfortably in bed, in no acute distress.  She continues to be very somnolent.  Family states she still has not woken up very much and has not been awake enough to eat or drink over the past 24 hours.  However, she is more arousable today and will open her eyes on command and will murmur when spoken to.   This is an improvement over yesterday's exam.  Ammonia level was negative yesterday.  Patient on appropriate antibiotics for aspiration pneumonia with a penicillin allergy.  Today, we will change her fluids and increase the rate slightly.  Patient is slowly improving.   1/30-CTH today for AMS.  Suspect this is related to medication.  MAYTE as well.  Will increase IVF as she is not eating well.  If she does not awaken soon will need NGT for enteral feeding.  1/31-more awake today, still not eating. Will place NGT      Interval History: naeo    Review of Systems   Reason unable to perform ROS: dementia.   Objective:     Vital Signs (Most Recent):  Temp: 99.6 °F (37.6 °C) (01/30/23 2300)  Pulse: 102 (01/31/23 0329)  Resp: 16 (01/31/23 0329)  BP: (!) 160/71 (01/31/23 0329)  SpO2: 100 % (01/31/23 0730)   Vital Signs (24h Range):  Temp:  [98.9 °F (37.2 °C)-99.6 °F (37.6 °C)] 99.6 °F (37.6 °C)  Pulse:  [102-127] 102  Resp:  [16-18] 16  SpO2:  [96 %-100 %] 100 %  BP: (141-189)/(61-96) 160/71     Weight: 52.2 kg (115 lb 1.3 oz)  Body mass index is 21.05 kg/m².    Intake/Output Summary (Last 24 hours) at 1/31/2023 1100  Last data filed at 1/31/2023 0540  Gross per 24 hour   Intake 1230 ml   Output 1100 ml   Net 130 ml        Physical Exam  Vitals reviewed.   Constitutional:       Appearance: Normal appearance.   HENT:      Head: Normocephalic and atraumatic.   Eyes:      Extraocular Movements: Extraocular movements intact.   Cardiovascular:      Rate and Rhythm: Normal rate and regular rhythm.      Pulses: Normal pulses.   Pulmonary:      Effort: Pulmonary effort is normal.      Breath sounds: Normal breath sounds.   Abdominal:      General: Abdomen is flat.      Palpations: Abdomen is soft.   Musculoskeletal:         General: Tenderness present.   Skin:     General: Skin is warm and dry.      Capillary Refill: Capillary refill takes less than 2 seconds.   Neurological:      General: No focal deficit present.      Mental  Status: She is alert.   Psychiatric:         Mood and Affect: Mood normal.         Behavior: Behavior normal.       Significant Labs: All pertinent labs within the past 24 hours have been reviewed.    Significant Imaging: I have reviewed all pertinent imaging results/findings within the past 24 hours.      Assessment/Plan:      * Intertrochanteric fracture of left femur  Patient is on eliquis and recommend at least 24h off before surgery.    I see no medical reason why patient cannot proceed to surgery.     Per AHA RCRI she has a less than 2.5% change of perioperative cardiac risk.    High risk is greater than 1%. Patient is not in pulmonary edema or with unstable angina or valvular hear disease.   Agree with pain control and rehab. Will place 5 lbs Worrell's traction.    Will place escobar and check UA and treat any bacteruria.  Will treat if clinically indicated.        Encephalopathy, metabolic  Suspect realted to medications  Stop benzos, minimal narcotics, stop sleep aids    Questionable spot on CTH, but patient more awake. Hold on MRI for now  NGT      Fall        Alzheimer's dementia with anxiety    Continue aricept and namenda combination.       GERD (gastroesophageal reflux disease)  Continue PPI      Hypertension    Continue home antihypertensive agents    Mixed hyperlipidemia  Continue home statin.        Paroxysmal atrial fibrillation  Patient with Long standing persistent (>12 months) atrial fibrillation which is controlled currently with Beta Blocker. Patient is currently in atrial fibrillation.[id number] Score: 3. HASBLED Score: 1. Anticoagulation indicated. Anticoagulation done with eliquis     Ruben garrido        Thyroid disease  Continue home synthroid.         VTE Risk Mitigation (From admission, onward)         Ordered     apixaban tablet 2.5 mg  2 times daily         01/31/23 1102     IP VTE HIGH RISK PATIENT  Once         01/27/23 1554     Place JETHRO hose  Until discontinued         01/27/23 3396      Place sequential compression device  Until discontinued         01/27/23 1549     Place sequential compression device  Until discontinued         01/26/23 2159                Discharge Planning   CARLITOS:      Code Status: DNR   Is the patient medically ready for discharge?:     Reason for patient still in hospital (select all that apply): Treatment  Discharge Plan A: Other (AMY Quinones)                  Juanito Huggins DO  Department of Hospital Medicine   [facility]

## 2023-01-31 NOTE — PLAN OF CARE
Problem: Adult Inpatient Plan of Care  Goal: Plan of Care Review  Outcome: Ongoing, Progressing  Flowsheets (Taken 1/30/2023 1814)  Plan of Care Reviewed With:   patient   daughter  Goal: Patient-Specific Goal (Individualized)  Outcome: Ongoing, Progressing  Flowsheets (Taken 1/30/2023 1814)  Anxieties, Fears or Concerns: none  Individualized Care Needs: PT daughter is worried about BP  Goal: Absence of Hospital-Acquired Illness or Injury  Outcome: Ongoing, Progressing  Goal: Optimal Comfort and Wellbeing  Outcome: Ongoing, Progressing  Goal: Readiness for Transition of Care  Outcome: Ongoing, Progressing

## 2023-01-31 NOTE — PLAN OF CARE
Problem: Infection  Goal: Absence of Infection Signs and Symptoms  Outcome: Ongoing, Not Progressing     Problem: Adult Inpatient Plan of Care  Goal: Plan of Care Review  Outcome: Ongoing, Not Progressing  Goal: Patient-Specific Goal (Individualized)  Outcome: Ongoing, Not Progressing  Goal: Absence of Hospital-Acquired Illness or Injury  Outcome: Ongoing, Not Progressing  Goal: Optimal Comfort and Wellbeing  Outcome: Ongoing, Not Progressing  Goal: Readiness for Transition of Care  Outcome: Ongoing, Not Progressing     Problem: Impaired Wound Healing  Goal: Optimal Wound Healing  Outcome: Ongoing, Not Progressing     Problem: Skin Injury Risk Increased  Goal: Skin Health and Integrity  Outcome: Ongoing, Not Progressing     Problem: Fall Injury Risk  Goal: Absence of Fall and Fall-Related Injury  Outcome: Ongoing, Not Progressing

## 2023-02-01 NOTE — PROGRESS NOTES
[facility]  [facility] Medicine  Progress Note    Patient Name: Kymberly Franco  MRN: [id number]  Patient Class: IP- Inpatient   Admission Date: 1/26/2023  Length of Stay: 6 days  Attending Physician: Juanito Huggins DO  Primary Care Provider: Andry Galarza MD        Subjective:     Principal Problem:Intertrochanteric fracture of left femur        HPI:  89 yo F presents to the ED after an accidental fall at home.  She hit her head and has LLE pain.  She is on eliquis for CAF and head CT negative for hemorrhage but xray of left hip and pelvis showed an intertrochanteric fracture.  Dr. Cheung has been following her after a fall and suffering a right humeral  fracture.  Her daughter, Stephanie Ramos who works in [employer] here at [facility], says that she has a very unsteady gait and fall frequently.  She did not have focal weakness or episode of syncope.      Patient is very [place] and has some mild dementia.  Her daughter does not report any complaints of CP, SOB, palpitations or N/V/D.      Overview/Hospital Course:  1/27-no complaints today. NPO for OR  1/28- the patient underwent repair of left femur fracture yesterday, which went well.  She seems to have an acute mental status change this a.m. being very hard to arouse and elicit a response.  An RRT was called on the patient's a.m..  Chest x-ray shows left lower lobe infiltrate possible pneumonia.  The patient had sedation for surgery as well as trazodone last night.  Possible lingering affects.  Added metronidazole with ceftriaxone for possible aspiration pneumonia.  Patient has a penicillin allergy.  1/29- patient was seen examined today resting comfortably in bed, in no acute distress.  She continues to be very somnolent.  Family states she still has not woken up very much and has not been awake enough to eat or drink over the past 24 hours.  However, she is more arousable today and will open her eyes on command and will murmur when spoken to.   This is an improvement over yesterday's exam.  Ammonia level was negative yesterday.  Patient on appropriate antibiotics for aspiration pneumonia with a penicillin allergy.  Today, we will change her fluids and increase the rate slightly.  Patient is slowly improving.   1/30-CTH today for AMS.  Suspect this is related to medication.  MAYTE as well.  Will increase IVF as she is not eating well.  If she does not awaken soon will need NGT for enteral feeding.  1/31-more awake today, still not eating. Will place NGT  2/1-continues to be more awake and family says she is now asking for water.  Can pull NGT once awake enough to eat.  I believe this is related to medication stacking with anesthesia with her underlying dementia.  Improving.       Interval History: naeo    Review of Systems   Reason unable to perform ROS: dementia.   Objective:     Vital Signs (Most Recent):  Temp: 97.9 °F (36.6 °C) (02/01/23 1100)  Pulse: 108 (02/01/23 1100)  Resp: 18 (02/01/23 1100)  BP: 109/61 (02/01/23 1100)  SpO2: 96 % (02/01/23 1100)   Vital Signs (24h Range):  Temp:  [97.4 °F (36.3 °C)-98.5 °F (36.9 °C)] 97.9 °F (36.6 °C)  Pulse:  [] 108  Resp:  [18-20] 18  SpO2:  [94 %-97 %] 96 %  BP: (104-139)/(60-74) 109/61     Weight: 52.2 kg (115 lb 1.3 oz)  Body mass index is 21.05 kg/m².    Intake/Output Summary (Last 24 hours) at 2/1/2023 1547  Last data filed at 2/1/2023 0652  Gross per 24 hour   Intake 553 ml   Output 700 ml   Net -147 ml        Physical Exam  Vitals reviewed.   Constitutional:       Appearance: Normal appearance.   HENT:      Head: Normocephalic and atraumatic.   Eyes:      Extraocular Movements: Extraocular movements intact.   Cardiovascular:      Rate and Rhythm: Normal rate and regular rhythm.      Pulses: Normal pulses.   Pulmonary:      Effort: Pulmonary effort is normal.      Breath sounds: Normal breath sounds.   Abdominal:      General: Abdomen is flat.      Palpations: Abdomen is soft.   Musculoskeletal:          General: Tenderness present.   Skin:     General: Skin is warm and dry.      Capillary Refill: Capillary refill takes less than 2 seconds.   Neurological:      General: No focal deficit present.      Mental Status: She is alert.   Psychiatric:         Mood and Affect: Mood normal.         Behavior: Behavior normal.       Significant Labs: All pertinent labs within the past 24 hours have been reviewed.    Significant Imaging: I have reviewed all pertinent imaging results/findings within the past 24 hours.      Assessment/Plan:      * Intertrochanteric fracture of left femur  Patient is on eliquis and recommend at least 24h off before surgery.    I see no medical reason why patient cannot proceed to surgery.     Per AHA RCRI she has a less than 2.5% change of perioperative cardiac risk.    High risk is greater than 1%. Patient is not in pulmonary edema or with unstable angina or valvular hear disease.   Agree with pain control and rehab. Will place 5 lbs Worrell's traction.    Will place escobar and check UA and treat any bacteruria.  Will treat if clinically indicated.        Encephalopathy, metabolic  Suspect realted to medications  Stop benzos, minimal narcotics, stop sleep aids    Questionable spot on CTH, but patient more awake. Hold on MRI for now  NGT    Even more awake today and requesting water-if continues to improve can pull NGT and hopefully discharge    Fall        Alzheimer's dementia with anxiety    Continue aricept and namenda combination.       GERD (gastroesophageal reflux disease)  Continue PPI      Hypertension    Continue home antihypertensive agents    Mixed hyperlipidemia  Continue home statin.        Paroxysmal atrial fibrillation  Patient with Long standing persistent (>12 months) atrial fibrillation which is controlled currently with Beta Blocker. Patient is currently in atrial fibrillation.[id number] Score: 3. HASBLED Score: 1. Anticoagulation indicated. Anticoagulation done with eliquis      Eliquis dilt        Thyroid disease  Continue home synthroid.         VTE Risk Mitigation (From admission, onward)         Ordered     apixaban tablet 2.5 mg  2 times daily         01/31/23 1102     IP VTE HIGH RISK PATIENT  Once         01/27/23 1549     Place JETHRO hose  Until discontinued         01/27/23 1549     Place sequential compression device  Until discontinued         01/27/23 1549     Place sequential compression device  Until discontinued         01/26/23 2159                Discharge Planning   CARLITOS:      Code Status: DNR   Is the patient medically ready for discharge?:     Reason for patient still in hospital (select all that apply): Treatment  Discharge Plan A: Other ([facility])                  Juanito Huggins DO  Department of Hospital Medicine   [facility]

## 2023-02-01 NOTE — PT/OT/SLP PROGRESS
"Physical Therapy Treatment    Patient Name:  Kymberly Franco   MRN:  [id number]    Recommendations:     Discharge Recommendations: nursing facility, skilled, rehabilitation facility  Discharge Equipment Recommendations: other (see comments) (to be determined)  Barriers to discharge:  Ongoing medical treatment     Assessment:     Kymberly Franco is a 89 y.o. female admitted with a medical diagnosis of Intertrochanteric fracture of left femur.  She presents with the following impairments/functional limitations: impaired endurance, weakness, impaired self care skills, impaired functional mobility, gait instability, impaired balance, impaired cognition, decreased upper extremity function, decreased lower extremity function, decreased safety awareness, impaired cardiopulmonary response to activity.    Pt demonstrated increased lethargy compared to previous treatment but was able to provide minimal communication and response to daughter. Pt demonstrated dependence with all bed mobility and transfers but was able to maintain EOB sitting with Max A for 20 minutes. Pt will benefit from swing bed placement once medically stable to increase functional mobility and reduce caregiver burden.     Rehab Prognosis: Fair; patient would benefit from acute skilled PT services to address these deficits and reach maximum level of function.    Recent Surgery: Procedure(s) (LRB):  INSERTION, INTRAMEDULLARY THANG, FEMUR, TROCHANTER (Left) 5 Days Post-Op    Plan:     During this hospitalization, patient to be seen 5 x/week to address the identified rehab impairments via gait training, therapeutic activities, therapeutic exercises, neuromuscular re-education and progress toward the following goals:    Plan of Care Expires:  03/02/23    Subjective     Chief Complaint: Intertrochanteric fracture of L femur  Patient/Family Comments/goals: "I want some water."  Pain/Comfort:  Pain Rating 1: 0/10  Pain Rating Post-Intervention 1: 0/10      Objective: "     Communicated with Rock Camarillo LPN prior to session.  Patient found supine with peripheral IV, pulse ox (continuous), blood pressure cuff, escobar catheter, oxygen upon PT entry to room.     General Precautions: Standard, fall  Orthopedic Precautions: LLE toe touch weight bearing  Braces: N/A  Respiratory Status: Nasal cannula, flow 2 L/min     Functional Mobility:  Bed Mobility:     Rolling Left:  dependence  Rolling Right: dependence  Supine to Sit: dependence  Sit to Supine: dependence  Transfers:     Sit to Stand: Unable to perform due to pt fatigue and poor trunk control sitting EOB.  Sitting: EOB for 20 minutes with Max A x 2 with focus on midline stability and axial extension.       AM-PAC 6 CLICK MOBILITY  Turning over in bed (including adjusting bedclothes, sheets and blankets)?: 1  Sitting down on and standing up from a chair with arms (e.g., wheelchair, bedside commode, etc.): 2  Moving from lying on back to sitting on the side of the bed?: 2  Moving to and from a bed to a chair (including a wheelchair)?: 1  Need to walk in hospital room?: 1  Climbing 3-5 steps with a railing?: 1  Basic Mobility Total Score: 8       Treatment & Education:  Sitting balance as above.  PROM x 10 reps: ankle pumps, heel slides, abd/add, straight leg raise    Patient left supine with all lines intact, call button in reach, Rock Camarillo LPN notified, and daughter present..    GOALS:   Multidisciplinary Problems       Physical Therapy Goals          Problem: Physical Therapy    Goal Priority Disciplines Outcome Goal Variances Interventions   Physical Therapy Goal     PT, PT/OT Ongoing, Progressing     Description: Short Term Goals to be met by 2/14/23    Patient will increase functional independence and safety with mobility by performing    1.   Rolling right Moderate Assist; roll left Moderate Assist  2.   Supine to sit Moderate Assist  3.   Sitting balance edge of the bed x 15 minutes contact guard assist  4.    Sit to stand Moderate Assist  using manual assistance with gait belt and TTWB L LE  5.   Bed to chair Moderate Assist using manual assistance with gait belt and TTWB L LE  6.   Lower extremity exercises x 30 reps with assist as necessary and no rest breaks      Long Term Goals to be met by 3/2/23    Patient to require less than or equal to Minimal Assist for functional mobility to ease caregiver burden                        Time Tracking:     PT Received On: 02/01/23  PT Start Time: 0902     PT Stop Time: 0938  PT Total Time (min): 36 min     Billable Minutes: Therapeutic Activity 30 minutes    Treatment Type: Treatment  PT/PTA: PT     PTA Visit Number: 0     02/01/2023

## 2023-02-01 NOTE — ASSESSMENT & PLAN NOTE
Suspect realted to medications  Stop benzos, minimal narcotics, stop sleep aids    Questionable spot on CTH, but patient more awake. Hold on MRI for now  NGT    Even more awake today and requesting water-if continues to improve can pull NGT and hopefully discharge

## 2023-02-01 NOTE — SUBJECTIVE & OBJECTIVE
Interval History: naeo    Review of Systems   Reason unable to perform ROS: dementia.   Objective:     Vital Signs (Most Recent):  Temp: 97.9 °F (36.6 °C) (02/01/23 1100)  Pulse: 108 (02/01/23 1100)  Resp: 18 (02/01/23 1100)  BP: 109/61 (02/01/23 1100)  SpO2: 96 % (02/01/23 1100)   Vital Signs (24h Range):  Temp:  [97.4 °F (36.3 °C)-98.5 °F (36.9 °C)] 97.9 °F (36.6 °C)  Pulse:  [] 108  Resp:  [18-20] 18  SpO2:  [94 %-97 %] 96 %  BP: (104-139)/(60-74) 109/61     Weight: 52.2 kg (115 lb 1.3 oz)  Body mass index is 21.05 kg/m².    Intake/Output Summary (Last 24 hours) at 2/1/2023 1547  Last data filed at 2/1/2023 0652  Gross per 24 hour   Intake 553 ml   Output 700 ml   Net -147 ml        Physical Exam  Vitals reviewed.   Constitutional:       Appearance: Normal appearance.   HENT:      Head: Normocephalic and atraumatic.   Eyes:      Extraocular Movements: Extraocular movements intact.   Cardiovascular:      Rate and Rhythm: Normal rate and regular rhythm.      Pulses: Normal pulses.   Pulmonary:      Effort: Pulmonary effort is normal.      Breath sounds: Normal breath sounds.   Abdominal:      General: Abdomen is flat.      Palpations: Abdomen is soft.   Musculoskeletal:         General: Tenderness present.   Skin:     General: Skin is warm and dry.      Capillary Refill: Capillary refill takes less than 2 seconds.   Neurological:      General: No focal deficit present.      Mental Status: She is alert.   Psychiatric:         Mood and Affect: Mood normal.         Behavior: Behavior normal.       Significant Labs: All pertinent labs within the past 24 hours have been reviewed.    Significant Imaging: I have reviewed all pertinent imaging results/findings within the past 24 hours.

## 2023-02-01 NOTE — PLAN OF CARE
Problem: Adult Inpatient Plan of Care  Goal: Plan of Care Review  Outcome: Ongoing, Progressing  Flowsheets (Taken 2/1/2023 1743)  Plan of Care Reviewed With: patient  Goal: Patient-Specific Goal (Individualized)  Outcome: Ongoing, Progressing  Flowsheets (Taken 2/1/2023 1743)  Anxieties, Fears or Concerns: none  Individualized Care Needs: daughter is worrid about pt  Goal: Absence of Hospital-Acquired Illness or Injury  Outcome: Ongoing, Progressing  Goal: Optimal Comfort and Wellbeing  Outcome: Ongoing, Progressing  Goal: Readiness for Transition of Care  Outcome: Ongoing, Progressing

## 2023-02-01 NOTE — PLAN OF CARE
Problem: Infection  Goal: Absence of Infection Signs and Symptoms  Outcome: Ongoing, Progressing     Problem: Adult Inpatient Plan of Care  Goal: Plan of Care Review  Outcome: Ongoing, Progressing  Goal: Patient-Specific Goal (Individualized)  Outcome: Ongoing, Progressing  Goal: Absence of Hospital-Acquired Illness or Injury  Outcome: Ongoing, Progressing  Goal: Optimal Comfort and Wellbeing  Outcome: Ongoing, Progressing  Goal: Readiness for Transition of Care  Outcome: Ongoing, Progressing     Problem: Impaired Wound Healing  Goal: Optimal Wound Healing  Outcome: Ongoing, Progressing     Problem: Skin Injury Risk Increased  Goal: Skin Health and Integrity  Outcome: Ongoing, Progressing     Problem: Fall Injury Risk  Goal: Absence of Fall and Fall-Related Injury  Outcome: Ongoing, Progressing     Problem: Aspiration (Enteral Nutrition)  Goal: Absence of Aspiration Signs and Symptoms  Outcome: Ongoing, Progressing     Problem: Device-Related Complication Risk (Enteral Nutrition)  Goal: Safe, Effective Therapy Delivery  Outcome: Ongoing, Progressing     Problem: Feeding Intolerance (Enteral Nutrition)  Goal: Feeding Tolerance  Outcome: Ongoing, Progressing

## 2023-02-02 NOTE — PLAN OF CARE
SS spoke with pt's daughter about LTAC. She wants to talk about it with her family tonight. Voiced other possible options would be our critical access hospitals.

## 2023-02-02 NOTE — SUBJECTIVE & OBJECTIVE
Interval History: naeo    Review of Systems   Reason unable to perform ROS: dementia.   Objective:     Vital Signs (Most Recent):  Temp: 98.6 °F (37 °C) (02/02/23 1132)  Pulse: 93 (02/02/23 1132)  Resp: 16 (02/02/23 1132)  BP: (!) 104/58 (02/02/23 1132)  SpO2: 96 % (02/02/23 1132)   Vital Signs (24h Range):  Temp:  [97.9 °F (36.6 °C)-99.2 °F (37.3 °C)] 98.6 °F (37 °C)  Pulse:  [] 93  Resp:  [16-18] 16  SpO2:  [94 %-97 %] 96 %  BP: (104-129)/(53-77) 104/58     Weight: 52.2 kg (115 lb 1.3 oz)  Body mass index is 21.05 kg/m².    Intake/Output Summary (Last 24 hours) at 2/2/2023 1415  Last data filed at 2/2/2023 1046  Gross per 24 hour   Intake 547 ml   Output 1050 ml   Net -503 ml        Physical Exam  Vitals reviewed.   Constitutional:       Appearance: Normal appearance. She is ill-appearing and toxic-appearing.   HENT:      Head: Normocephalic and atraumatic.   Cardiovascular:      Rate and Rhythm: Regular rhythm. Tachycardia present.      Pulses: Normal pulses.      Heart sounds: Murmur heard.   Pulmonary:      Effort: Pulmonary effort is normal.      Breath sounds: Normal breath sounds.   Abdominal:      General: Abdomen is flat.      Palpations: Abdomen is soft.   Musculoskeletal:         General: Tenderness present.   Skin:     General: Skin is warm and dry.      Capillary Refill: Capillary refill takes less than 2 seconds.   Neurological:      General: No focal deficit present.      Mental Status: She is alert. She is disoriented.       Significant Labs: All pertinent labs within the past 24 hours have been reviewed.    Significant Imaging: I have reviewed all pertinent imaging results/findings within the past 24 hours.

## 2023-02-02 NOTE — PT/OT/SLP PROGRESS
Physical Therapy Treatment    Patient Name:  Kymberly Franco   MRN:  [id number]    Recommendations:     Discharge Recommendations: nursing facility, skilled, nursing facility, basic, LTACH (long-term acute care hospital)  Discharge Equipment Recommendations: none  Barriers to discharge: Inaccessible home    Assessment:     Kymberly Franco is a 89 y.o. female admitted with a medical diagnosis of Intertrochanteric fracture of left femur.  She presents with the following impairments/functional limitations: weakness, impaired functional mobility, impaired cognition, impaired balance, decreased lower extremity function, decreased upper extremity function, decreased safety awareness Pt has low functional potential. She requires maximum assistance with all mobility and is unable to sit at edge of bed without support. Pt demonstrates clonus in left foot and rigidity in left arm. Prior CT was unremarkable but appears to have some neurological symptoms.    Rehab Prognosis: Poor; patient would benefit from acute skilled PT services to address these deficits and reach maximum level of function.    Recent Surgery: Procedure(s) (LRB):  INSERTION, INTRAMEDULLARY THANG, FEMUR, TROCHANTER (Left) 6 Days Post-Op    Plan:     During this hospitalization, patient to be seen daily to address the identified rehab impairments via gait training, therapeutic activities, therapeutic exercises, neuromuscular re-education and progress toward the following goals:    Plan of Care Expires:  03/02/23    Subjective     Chief Complaint: left hip fracture  Patient/Family Comments/goals: Family agreeable to PT  Pain/Comfort:  Pain Rating 1: 3/10  Location - Side 1: Left  Location 1: hip  Pain Addressed 1: Reposition  Pain Rating Post-Intervention 1: 2/10      Objective:     Communicated with GHAZAL Breen LPN prior to session.  Patient found HOB elevated with blood pressure cuff, escobar catheter, pulse ox (continuous), peripheral IV, oxygen upon PT entry to room.      General Precautions: Standard, fall  Orthopedic Precautions: LLE toe touch weight bearing  Braces: N/A  Respiratory Status: Nasal cannula, flow 2 L/min     Functional Mobility:  Bed Mobility:     Rolling Left:  dependence  Rolling Right: dependence  Scooting: dependence  Supine to Sit: dependence  Sit to Supine: dependence  Balance: maximum assistance to sit at edge of bed       AM-PAC 6 CLICK MOBILITY  Turning over in bed (including adjusting bedclothes, sheets and blankets)?: 2  Sitting down on and standing up from a chair with arms (e.g., wheelchair, bedside commode, etc.): 1  Moving from lying on back to sitting on the side of the bed?: 2  Moving to and from a bed to a chair (including a wheelchair)?: 1  Need to walk in hospital room?: 1  Climbing 3-5 steps with a railing?: 1  Basic Mobility Total Score: 8       Treatment & Education:  Pt assisted to edge of bed with maximum assistance, sat at edge of bed x 15 minutes with maximum assistance for trunk support, assisted in lateral rocking to facilitate postural stability    Patient left HOB elevated with all lines intact and call button in reach..    GOALS:   Multidisciplinary Problems       Physical Therapy Goals          Problem: Physical Therapy    Goal Priority Disciplines Outcome Goal Variances Interventions   Physical Therapy Goal     PT, PT/OT Ongoing, Progressing     Description: Short Term Goals to be met by 2/14/23    Patient will increase functional independence and safety with mobility by performing    1.   Rolling right Moderate Assist; roll left Moderate Assist  2.   Supine to sit Moderate Assist  3.   Sitting balance edge of the bed x 15 minutes contact guard assist  4.   Sit to stand Moderate Assist  using manual assistance with gait belt and TTWB L LE  5.   Bed to chair Moderate Assist using manual assistance with gait belt and TTWB L LE  6.   Lower extremity exercises x 30 reps with assist as necessary and no rest breaks      Long Term Goals  to be met by 3/2/23    Patient to require less than or equal to Minimal Assist for functional mobility to ease caregiver burden                        Time Tracking:     PT Received On: 02/02/23  PT Start Time: 1005     PT Stop Time: 1028  PT Total Time (min): 23 min     Billable Minutes: Therapeutic Activity 15    Treatment Type: Treatment  PT/PTA: PT     PTA Visit Number: 0     02/02/2023

## 2023-02-02 NOTE — PLAN OF CARE
Problem: Infection  Goal: Absence of Infection Signs and Symptoms  Outcome: Ongoing, Progressing     Problem: Adult Inpatient Plan of Care  Goal: Plan of Care Review  Outcome: Ongoing, Progressing  Goal: Patient-Specific Goal (Individualized)  Outcome: Ongoing, Progressing  Goal: Absence of Hospital-Acquired Illness or Injury  Outcome: Ongoing, Progressing  Goal: Optimal Comfort and Wellbeing  Outcome: Ongoing, Progressing  Goal: Readiness for Transition of Care  Outcome: Ongoing, Progressing     Problem: Impaired Wound Healing  Goal: Optimal Wound Healing  Outcome: Ongoing, Progressing     Problem: Skin Injury Risk Increased  Goal: Skin Health and Integrity  Outcome: Ongoing, Progressing     Problem: Fall Injury Risk  Goal: Absence of Fall and Fall-Related Injury  Outcome: Ongoing, Progressing     Problem: Aspiration (Enteral Nutrition)  Goal: Absence of Aspiration Signs and Symptoms  Outcome: Ongoing, Progressing     Problem: Device-Related Complication Risk (Enteral Nutrition)  Goal: Safe, Effective Therapy Delivery  Outcome: Ongoing, Progressing

## 2023-02-02 NOTE — PROGRESS NOTES
"[facility]  Adult Nutrition  Follow-up Note         Reason for Assessment  Reason For Assessment: RD follow-up  Nutrition Risk Screen: tube feeding or parenteral nutrition    RD follow up. RD secure chat attending MD regarding changing recommended tube feeding goal rate from 50ml/hr to 42ml/hr and increasing free water flush from 25ml/hr to 35ml/hr - RD to adjust order.    Recommend continue advancing current tube feeding regimen Jevity 1.5 to goal rate 42ml/hr with 35ml free water flush. Will provide 1512 cals, 64g pro and 1534ml free water.     Keep HOB 30-45 degrees, monitor for abdominal pain/distention, n/v/c/d, gastric residuals >500ml.    Per MD note since last RD review:  "1/31-more awake today, still not eating. Will place NGT  2/1-continues to be more awake and family says she is now asking for water.  Can pull NGT once awake enough to eat.  I believe this is related to medication stacking with anesthesia with her underlying dementia.  Improving."    Patient currently receiving Jevity 1.5 tube feeding at 40ml/hr. Previous goal rate 50ml/hr. Recommend adjusting goal rate to 42ml/hr to provide 1512 kcals (meets 100% of estimated energy needs), 64g protein (100% of estimated energy needs), and 694ml/day. Recommend increasing FWF from 25ml/hr to 35ml/hr to increase FWF from 600ml/day to 840ml/day.    Wt Readings from Last 3 Encounters:   01/30/23 0450 52.2 kg (115 lb 1.3 oz)   01/26/23 2245 52.2 kg (115 lb)   01/26/23 1943 52.2 kg (115 lb)   10/14/22 1335 52.2 kg (115 lb)   Patient with stable weight history. Patient CBW is WNL for IBW range; BMI considered underweight per geriatric guidelines. Will continue to monitor weight trend.    Last BM 2/2 per flowsheets. Will continue to monitor tube feeding adequacy/tolerance, weight trend, meds, labs, updates in patient condition. RD following.    Malnutrition  Is Patient Malnourished: No    Nutrition Diagnosis  Inadequate energy intake "   related to Appetite loss as evidenced by requiring NG feedings to maintain nutritional status     Nutrition Diagnosis Status: Chronic/ continues    Nutrition Risk  Level of Risk/Frequency of Follow-up: moderate - high   Chewing or Swallowing Difficulty?: pt receiving nutrition support via NGT    Estimated/Assessed Needs    Temp: 99.2 °F (37.3 °C)Axillary  Weight Used For Calorie Calculations: 52.2 kg (115 lb 1.3 oz)   Energy Need Method: Kcal/kg Energy Calorie Requirements (kcal): 8997-6961  Weight Used For Protein Calculations: 52.2 kg (115 lb 1.3 oz)  Protein Requirements: 52-63  Estimated Fluid Requirement Method: RDA Method    RDA Method (mL): 1305     Nutrition Prescription / Recommendations  Recommendation/Intervention: Recommend continue advancing current tube feeding regimen Jevity 1.5 to goal rate 42ml/hr with 35ml free water flush. Will provide 1512 cals, 64g pro and 1534ml free water. Keep HOB 30-45 degrees, monitor for abdominal pain/distention, n/v/c/d, gastric residuals >500ml.  Goals: weight maintenance, tolerance of tube feeds  Nutrition Goal Status: progressing towards goal  Communication of RD Recs: reviewed with physician  Current Diet Order: NPO  Nutrition Order Comments: pt receiving enteral nutrition via NGT  Current Nutrition Support Formula Ordered: Jevity 1.5  Recommended Diet: Enteral Nutrition  Recommended Oral Supplement: No Oral Supplements  Is Nutrition Support Recommended: Yes   Needs Calculated  Energy Need Method: Kcal/kg Energy Calorie Requirements (kcal): 8750-0030  Protein Requirements: 52-63  Enteral Nutrition   Enteral Nutrition Formula Provides:  1512 kcals Propofol Rate: No  64 g Protein  50 g Fat  217 g Carbohydrates  694 ml Fluid without Flush                          840 ml Fluid by flush   1534 ml Total Fluid  Enteral Nutrition Recommended Order:  Tube feeding via NG/ Dobhoff  Tube feeding formula: Jevity 1.5 Continuous 42 ml/h  Free Water Flush: 35 ml hourly  Modular  Supplements:No Modular Supplements needed  Enteral Nutrition meets needs?: yes  Enteral Nutrition Status: Changing Enteral Nutrition to Recommendation    Monitor and Evaluation  % current Intake: Enteral Nutrition progressing to goal  % intake to meet estimated needs: Enteral Nutrition   Food and Nutrient Intake: enteral nutrition intake  Food and Nutrient Adminstration: enteral and parenteral nutrition administration  Anthropometric Measurements: weight, weight change, body mass index  Biochemical Data, Medical Tests and Procedures: electrolyte and renal panel, lipid profile, gastrointestinal profile, glucose/endocrine profile, inflammatory profile  Nutrition-Focused Physical Findings: overall appearance, extremities, muscles and bones, head and eyes, skin  Enteral Calories (kcal): 1440 (pt receiving 40ml/hr per flowsheets)  Enteral Protein (gm): 61 (pt receiving 40ml/hr per flowsheets)  Enteral (Free Water) Fluid (mL): 660 (pt receiving 40ml/hr per flowsheets)  Free Water Flush Fluid (mL): 600  Total Calories (kcal): 1440  Total Calories (kcal/kg): 27.6  % Kcal Needs: 100  Total Protein (gm/kg): 1.17  % Protein Needs: 100  Total Fluid Intake (mL): 1260  Energy Calories Required: meeting needs  Protein Required: meeting needs  Tolerance: tolerating    Current Medical Diagnosis and Past Medical History  Diagnosis: other (see comments) (intertrochanteric fracture of left femur)  Past Medical History:   Diagnosis Date    Alzheimer's dementia with anxiety     Anticoagulant long-term use     GERD (gastroesophageal reflux disease)     Hypertension     Mixed hyperlipidemia     Paroxysmal atrial fibrillation     Thyroid disease      Nutrition/Diet History  Spiritual, Cultural Beliefs, Jain Practices, Values that Affect Care: no  Food Allergies: NKFA  Factors Affecting Nutritional Intake: decreased appetite    Lab/Procedures/Meds  Recent Labs   Lab 02/02/23  0644   *   K 4.3   BUN 41*   CREATININE 1.27*   GLU  "180*   CALCIUM 7.9*   *     Last A1c: No results found for: HGBA1C  Lab Results   Component Value Date    RBC 2.89 (L) 01/29/2023    HGB 8.3 (L) 01/29/2023    HCT 27.1 (L) 01/29/2023    MCV 93.8 01/29/2023    MCH 28.7 01/29/2023    MCHC 30.6 (L) 01/29/2023     Pertinent Labs Reviewed: reviewed  Pertinent Labs Comments:   Na 153 (H), Cl 121 (H), BUN 41 (H), Creatinine 1.27 (H), BUN/CREAT RATIO 32 (H), eGFR 39 (L) - possibly r/t fluid status,  (H) - pt with no PMH of DM, possibly r/t stress response, Ca 7.9 (L) - replete to WNL as needed  Pertinent Medications Reviewed: reviewed  Pertinent Medications Comments: apixaban, diltiazem, donepezil, isosorbide mononitrate, levothyroxine, memantine, lopressor, pantoprazole, sertraline    Anthropometrics  Temp: 99.2 °F (37.3 °C)  Height Method: Estimated  Height: 5' 2" (157.5 cm)  Height (inches): 62 in  Weight Method: Bed Scale  Weight: 52.2 kg (115 lb 1.3 oz)  Weight (lb): 115.08 lb  Ideal Body Weight (IBW), Female: 110 lb  % Ideal Body Weight, Female (lb): 104.55 %  BMI (Calculated): 21  BMI Grade: 18.5-24.9 - normal     Nutrition by Nursing  Diet/Nutrition Received:  (unresponsive, no intake at all)  Intake (%): 0%     Diet/Feeding Tolerance: unable to eat  Last Bowel Movement: 02/02/23       NG/OG Tube 01/31/23 1300 Left nostril-Feeding Type: continuous, by pump       NG/OG Tube 01/31/23 1300 Left nostril-Current Rate (mL/hr): 40 mL/hr       NG/OG Tube 01/31/23 1300 Left nostril-Goal Rate (mL/hr): 50 mL/hr       NG/OG Tube 01/31/23 1300 Left nostril-Formula Name: Jevity 1.5    Nutrition Follow-Up  RD Follow-up?: Yes  "

## 2023-02-02 NOTE — PROGRESS NOTES
[facility]  [facility] Medicine  Progress Note    Patient Name: Kymberly Franco  MRN: [id number]  Patient Class: IP- Inpatient   Admission Date: 1/26/2023  Length of Stay: 7 days  Attending Physician: Arlen Parry MD  Primary Care Provider: Andry Galarza MD        Subjective:     Principal Problem:Intertrochanteric fracture of left femur        HPI:  89 yo F presents to the ED after an accidental fall at home.  She hit her head and has LLE pain.  She is on eliquis for CAF and head CT negative for hemorrhage but xray of left hip and pelvis showed an intertrochanteric fracture.  Dr. Cheung has been following her after a fall and suffering a right humeral  fracture.  Her daughter, Stephanie Ramos who works in [employer] here at [facility], says that she has a very unsteady gait and fall frequently.  She did not have focal weakness or episode of syncope.      Patient is very [place] and has some mild dementia.  Her daughter does not report any complaints of CP, SOB, palpitations or N/V/D.      Overview/Hospital Course:  1/27-no complaints today. NPO for OR  1/28- the patient underwent repair of left femur fracture yesterday, which went well.  She seems to have an acute mental status change this a.m. being very hard to arouse and elicit a response.  An RRT was called on the patient's a.m..  Chest x-ray shows left lower lobe infiltrate possible pneumonia.  The patient had sedation for surgery as well as trazodone last night.  Possible lingering affects.  Added metronidazole with ceftriaxone for possible aspiration pneumonia.  Patient has a penicillin allergy.  1/29- patient was seen examined today resting comfortably in bed, in no acute distress.  She continues to be very somnolent.  Family states she still has not woken up very much and has not been awake enough to eat or drink over the past 24 hours.  However, she is more arousable today and will open her eyes on command and will murmur when spoken to.   This is an improvement over yesterday's exam.  Ammonia level was negative yesterday.  Patient on appropriate antibiotics for aspiration pneumonia with a penicillin allergy.  Today, we will change her fluids and increase the rate slightly.  Patient is slowly improving.   1/30-CTH today for AMS.  Suspect this is related to medication.  MAYTE as well.  Will increase IVF as she is not eating well.  If she does not awaken soon will need NGT for enteral feeding.  1/31-more awake today, still not eating. Will place NGT  2/1-continues to be more awake and family says she is now asking for water.  Can pull NGT once awake enough to eat.  I believe this is related to medication stacking with anesthesia with her underlying dementia.  Improving.       Interval History: naeo    Review of Systems   Reason unable to perform ROS: dementia.   Objective:     Vital Signs (Most Recent):  Temp: 98.6 °F (37 °C) (02/02/23 1132)  Pulse: 93 (02/02/23 1132)  Resp: 16 (02/02/23 1132)  BP: (!) 104/58 (02/02/23 1132)  SpO2: 96 % (02/02/23 1132)   Vital Signs (24h Range):  Temp:  [97.9 °F (36.6 °C)-99.2 °F (37.3 °C)] 98.6 °F (37 °C)  Pulse:  [] 93  Resp:  [16-18] 16  SpO2:  [94 %-97 %] 96 %  BP: (104-129)/(53-77) 104/58     Weight: 52.2 kg (115 lb 1.3 oz)  Body mass index is 21.05 kg/m².    Intake/Output Summary (Last 24 hours) at 2/2/2023 1415  Last data filed at 2/2/2023 1046  Gross per 24 hour   Intake 547 ml   Output 1050 ml   Net -503 ml        Physical Exam  Vitals reviewed.   Constitutional:       Appearance: Normal appearance. She is ill-appearing and toxic-appearing.   HENT:      Head: Normocephalic and atraumatic.   Cardiovascular:      Rate and Rhythm: Regular rhythm. Tachycardia present.      Pulses: Normal pulses.      Heart sounds: Murmur heard.   Pulmonary:      Effort: Pulmonary effort is normal.      Breath sounds: Normal breath sounds.   Abdominal:      General: Abdomen is flat.      Palpations: Abdomen is soft.    Musculoskeletal:         General: Tenderness present.   Skin:     General: Skin is warm and dry.      Capillary Refill: Capillary refill takes less than 2 seconds.   Neurological:      General: No focal deficit present.      Mental Status: She is alert. She is disoriented.       Significant Labs: All pertinent labs within the past 24 hours have been reviewed.    Significant Imaging: I have reviewed all pertinent imaging results/findings within the past 24 hours.      Assessment/Plan:      * Intertrochanteric fracture of left femur  Patient is on eliquis and recommend at least 24h off before surgery.    I see no medical reason why patient cannot proceed to surgery.     Per AHA RCRI she has a less than 2.5% change of perioperative cardiac risk.    High risk is greater than 1%. Patient is not in pulmonary edema or with unstable angina or valvular hear disease.   Agree with pain control and rehab. Will place 5 lbs Worrell's traction.    Will place escobar and check UA and treat any bacteruria.  Will treat if clinically indicated.        Encephalopathy, metabolic  Suspect realted to medications  Stop benzos, minimal narcotics, stop sleep aids    Questionable spot on CTH, but patient more awake. Hold on MRI for now  NGT    Even more awake today and requesting water-if continues to improve can pull NGT and hopefully discharge    2/2-no improvement in mentation, will get MRI brain to further evaluate.     Fall        Alzheimer's dementia with anxiety    Continue aricept and namenda combination.       GERD (gastroesophageal reflux disease)  Continue PPI      Hypertension    Continue home antihypertensive agents    Mixed hyperlipidemia  Continue home statin.        Paroxysmal atrial fibrillation  Patient with Long standing persistent (>12 months) atrial fibrillation which is controlled currently with Beta Blocker. Patient is currently in atrial fibrillation.[id number] Score: 3. HASBLED Score: 1. Anticoagulation indicated.  Anticoagulation done with eliquis     Eliquis dilt        Thyroid disease  Continue home synthroid.         VTE Risk Mitigation (From admission, onward)         Ordered     apixaban tablet 2.5 mg  2 times daily         01/31/23 1102     IP VTE HIGH RISK PATIENT  Once         01/27/23 1549     Place JETHRO hose  Until discontinued         01/27/23 1549     Place sequential compression device  Until discontinued         01/27/23 1549     Place sequential compression device  Until discontinued         01/26/23 2159                Discharge Planning   CARLITOS:      Code Status: DNR   Is the patient medically ready for discharge?:     Reason for patient still in hospital (select all that apply): Treatment  Discharge Plan A: Other ([facility] [place] [facility])                  Rehmat U MD Brinda  Department of Hospital Medicine   [facility]

## 2023-02-02 NOTE — PT/OT/SLP PROGRESS
Occupational Therapy   Treatment    Name: Kymberly Franco  MRN: [id number]  Admitting Diagnosis:  Intertrochanteric fracture of left femur  6 Days Post-Op    Recommendations:     Discharge Recommendations: nursing facility, skilled, rehabilitation facility  Discharge Equipment Recommendations:   (to be determined)  Barriers to discharge:       Assessment:     Kymberly Franco is a 89 y.o. female with a medical diagnosis of Intertrochanteric fracture of left femur.  She presents with weakness, decreased functional mobility, and decline in ADLs. Performance deficits affecting function are weakness, impaired endurance, impaired self care skills, impaired functional mobility, gait instability, impaired balance, impaired cognition, decreased upper extremity function, decreased lower extremity function. Pt continues to present with decreased alertness and pt with increased tone noted to LUE with range of motion.     Rehab Prognosis:  Fair; patient would benefit from acute skilled OT services to address these deficits and reach maximum level of function.       Plan:     Patient to be seen 5 x/week to address the above listed problems via self-care/home management, therapeutic activities, therapeutic exercises  Plan of Care Expires: 02/27/23  Plan of Care Reviewed with: patient    Subjective     Pain/Comfort:  Pain Rating 1: 3/10  Location - Side 1: Left  Location 1: hip    Objective:     Communicated with: ALE Powell prior to session.  Patient found supine with escobar catheter, NG tube, oxygen, peripheral IV, SCD, telemetry upon OT entry to room.    General Precautions: Standard, fall    Orthopedic Precautions:LLE toe touch weight bearing  Braces: N/A  Respiratory Status: Nasal cannula, flow 1.5 L/min     Occupational Performance:     Bed Mobility:    Patient completed Rolling/Turning to Left with  dependent  Patient completed Rolling/Turning to Right with dependent  Patient completed Supine to Sit with dependent  Patient  completed Sit to Supine with dependent     Functional Mobility/Transfers:  Not performed    Activities of Daily Living:  Not performed      AMPAC 6 Click ADL:      Treatment & Education:  Pt performed EOB sitting x 15 minutes with MaxA to maintain sitting balance.     Patient left HOB elevated with all lines intact, call button in reach, Sherry, SHERMANN notified, and grandson present    GOALS:   Multidisciplinary Problems       Occupational Therapy Goals          Problem: Occupational Therapy    Goal Priority Disciplines Outcome Interventions   Occupational Therapy Goal     OT, PT/OT Ongoing, Progressing    Description: STG:  Pt will perform grooming with setup and mod(A)  Pt will perform UE dressing with max(A)  Pt will sit EOB x 5 min with min(A)  Pt will transfer bed/chair/bsc with max (A)  Pt will tolerate 10 minutes of tx without fatigue      LT.Restore to max I with self care and mobility.                          Time Tracking:     OT Date of Treatment: 23  OT Start Time: 1005  OT Stop Time: 1028  OT Total Time (min): 23 min    Billable Minutes:Therapeutic Activity 13 minutes    OT/YELENA: OT          2023

## 2023-02-02 NOTE — ASSESSMENT & PLAN NOTE
Suspect realted to medications  Stop benzos, minimal narcotics, stop sleep aids    Questionable spot on CTH, but patient more awake. Hold on MRI for now  NGT    Even more awake today and requesting water-if continues to improve can pull NGT and hopefully discharge    2/2-no improvement in mentation, will get MRI brain to further evaluate.

## 2023-02-02 NOTE — PT/OT/SLP PROGRESS
Occupational Therapy   Treatment    Name: Kymberly Franco  MRN: [id number]  Admitting Diagnosis:  Intertrochanteric fracture of left femur  5 Days Post-Op    Recommendations:     Discharge Recommendations: nursing facility, skilled, rehabilitation facility  Discharge Equipment Recommendations:   (to be determined)  Barriers to discharge:  None    Assessment:     Kymberly Franco is a 89 y.o. female with a medical diagnosis of Intertrochanteric fracture of left femur.  She presents with decline in functional status.Tx focused on ROM exercises (B) UE and EOB sitting. Performance deficits affecting function are weakness, impaired endurance, impaired self care skills, impaired functional mobility, impaired balance, decreased upper extremity function, decreased lower extremity function.     Pt with significant assistance with all mobility and UE exercises. Tolerated sitting x 20 min.    Rehab Prognosis:  Good; patient would benefit from acute skilled OT services to address these deficits and reach maximum level of function.       Plan:     Patient to be seen 5 x/week to address the above listed problems via self-care/home management, therapeutic activities, therapeutic exercises  Plan of Care Expires: 02/27/23  Plan of Care Reviewed with: patient, daughter    Subjective     Pain/Comfort:  Pain Rating 1: 0/10  Pain Rating Post-Intervention 1: 0/10    Objective:     Communicated with: ELIZABETH [place] prior to session.  Patient found HOB elevated with blood pressure cuff, escobar catheter, pulse ox (continuous), peripheral IV, oxygen upon OT entry to room.    General Precautions: Standard, fall    Orthopedic Precautions:LLE toe touch weight bearing  Braces: N/A  Respiratory Status: Nasal cannula, flow 1.5 L/min     Occupational Performance:     Bed Mobility:    Patient completed Rolling/Turning to Left with  dependent  Patient completed Rolling/Turning to Right with dependent  Patient completed Supine to Sit with dependent and  x 2 persons  Patient completed Sit to Supine with dependent and x 2 persons     Functional Mobility/Transfers:  NT  Functional Mobility: Pt sat EOB x 20 min with max a x 2. Pt unable to assist with correcting balance with verbal/tactile cues.    Activities of Daily Living:        [facility] 6 Click ADL:      Treatment & Education:  Pt received ROM exercises x 2 sets of 10 reps (B) shoulder flexion/extension,adduction/abduction and (B) elbow and wrist flexion/extension and forearm supination/pronation.    Patient left HOB elevated with all lines intact, call button in reach, nurse notified, and daughter present    GOALS:   Multidisciplinary Problems       Occupational Therapy Goals          Problem: Occupational Therapy    Goal Priority Disciplines Outcome Interventions   Occupational Therapy Goal     OT, PT/OT Ongoing, Progressing    Description: STG:  Pt will perform grooming with setup and mod(A)  Pt will perform UE dressing with max(A)  Pt will sit EOB x 5 min with min(A)  Pt will transfer bed/chair/bsc with max (A)  Pt will tolerate 10 minutes of tx without fatigue      LT.Restore to max I with self care and mobility.                          Time Tracking:     OT Date of Treatment: 23  OT Start Time: 902  OT Stop Time: 938  OT Total Time (min): 36 min    Billable Minutes:Therapeutic Activity 20    OT/YELENA: OT          2023

## 2023-02-03 NOTE — PT/OT/SLP PROGRESS
Physical Therapy      Patient Name:  Kymberly Franco   MRN:  [id number]    Patient not seen today secondary to Other (Comment) (family requested to d/c PT as pt is unable to participate and will be on hospice). Will d/c PT orders at this time.

## 2023-02-03 NOTE — CARE UPDATE
I spoke to the neurologist over the phone responsible for tele Neuro Stroke consult, we went over the patient's history, imaging, signs and symptoms, per the neurologist it seemed like the patient's last well known was more than 5 days ago, and per imaging the neurologist thinks that it may have been a watershed infarct in the setting of hypotension in the perioperative period.  At this point there is no role of permissive hypertension, and since the patient is already tolerating Eliquis therefore he recommends continuing Eliquis.  A formal one-to-one consult could not be done since there was no tele computer available at the time when the neurologist call me.  Since the patient is more than 5 days out, therefore not much can be offered other than continuing what is being done right now.  We will have goals of care discussion with the family tomorrow regarding PEG tube.  It is my understanding up until this morning patient's family did not want PEG placement.

## 2023-02-03 NOTE — SUBJECTIVE & OBJECTIVE
Interval History:     Pt w/ mri showing multiple infarct, watershed? D/w daughter, they dont want peg tube, she will talk to family today and let us know of [facility], for now she seems to want hospice / comfort measuers, though wants to run it be family first.     Review of Systems   Reason unable to perform ROS: dementia.   Objective:     Vital Signs (Most Recent):  Temp: 98.5 °F (36.9 °C) (02/03/23 0718)  Pulse: 96 (02/03/23 0718)  Resp: 20 (02/03/23 0718)  BP: (!) 126/58 (02/03/23 0718)  SpO2: 96 % (02/03/23 0831)   Vital Signs (24h Range):  Temp:  [97.7 °F (36.5 °C)-99.7 °F (37.6 °C)] 98.5 °F (36.9 °C)  Pulse:  [] 96  Resp:  [16-21] 20  SpO2:  [95 %-96 %] 96 %  BP: (104-135)/(56-82) 126/58     Weight: 52.2 kg (115 lb 1.3 oz)  Body mass index is 21.05 kg/m².    Intake/Output Summary (Last 24 hours) at 2/3/2023 1013  Last data filed at 2/3/2023 0715  Gross per 24 hour   Intake 912 ml   Output 1350 ml   Net -438 ml        Physical Exam  Vitals reviewed.   Constitutional:       Appearance: Normal appearance. She is ill-appearing and toxic-appearing.   HENT:      Head: Normocephalic and atraumatic.   Cardiovascular:      Rate and Rhythm: Regular rhythm. Tachycardia present.      Pulses: Normal pulses.      Heart sounds: Murmur heard.   Pulmonary:      Effort: Pulmonary effort is normal.      Breath sounds: Normal breath sounds.   Abdominal:      General: Abdomen is flat.      Palpations: Abdomen is soft.   Musculoskeletal:         General: Tenderness present.   Skin:     General: Skin is warm and dry.      Capillary Refill: Capillary refill takes less than 2 seconds.   Neurological:      General: No focal deficit present.      Mental Status: She is alert. She is disoriented.       Significant Labs: All pertinent labs within the past 24 hours have been reviewed.    Significant Imaging: I have reviewed all pertinent imaging results/findings within the past 24 hours.

## 2023-02-03 NOTE — PLAN OF CARE
SS consulted on pt for hospice. SS spoke with pt's daughter. She is considering options and pt's son is supposed to be coming in this weekend and they will decide on course they would like to take. SS will notify MD

## 2023-02-03 NOTE — PROGRESS NOTES
[facility]  [facility] Medicine  Progress Note    Patient Name: Kymberly Franco  MRN: [id number]  Patient Class: IP- Inpatient   Admission Date: 1/26/2023  Length of Stay: 8 days  Attending Physician: Arlen Parry MD  Primary Care Provider: Andry Galarza MD        Subjective:     Principal Problem:Intertrochanteric fracture of left femur        HPI:  89 yo F presents to the ED after an accidental fall at home.  She hit her head and has LLE pain.  She is on eliquis for CAF and head CT negative for hemorrhage but xray of left hip and pelvis showed an intertrochanteric fracture.  Dr. Cheung has been following her after a fall and suffering a right humeral  fracture.  Her daughter, Stephanie Ramos who works in [employer] here at [facility], says that she has a very unsteady gait and fall frequently.  She did not have focal weakness or episode of syncope.      Patient is very [place] and has some mild dementia.  Her daughter does not report any complaints of CP, SOB, palpitations or N/V/D.      Overview/Hospital Course:  1/27-no complaints today. NPO for OR  1/28- the patient underwent repair of left femur fracture yesterday, which went well.  She seems to have an acute mental status change this a.m. being very hard to arouse and elicit a response.  An RRT was called on the patient's a.m..  Chest x-ray shows left lower lobe infiltrate possible pneumonia.  The patient had sedation for surgery as well as trazodone last night.  Possible lingering affects.  Added metronidazole with ceftriaxone for possible aspiration pneumonia.  Patient has a penicillin allergy.  1/29- patient was seen examined today resting comfortably in bed, in no acute distress.  She continues to be very somnolent.  Family states she still has not woken up very much and has not been awake enough to eat or drink over the past 24 hours.  However, she is more arousable today and will open her eyes on command and will murmur when spoken to.   This is an improvement over yesterday's exam.  Ammonia level was negative yesterday.  Patient on appropriate antibiotics for aspiration pneumonia with a penicillin allergy.  Today, we will change her fluids and increase the rate slightly.  Patient is slowly improving.   1/30-CTH today for AMS.  Suspect this is related to medication.  MAYTE as well.  Will increase IVF as she is not eating well.  If she does not awaken soon will need NGT for enteral feeding.  1/31-more awake today, still not eating. Will place NGT  2/1-continues to be more awake and family says she is now asking for water.  Can pull NGT once awake enough to eat.  I believe this is related to medication stacking with anesthesia with her underlying dementia.  Improving.       Interval History:     Pt w/ mri showing multiple infarct, watershed? D/w daughter, they dont want peg tube, she will talk to family today and let us know of [facility], for now she seems to want hospice / comfort measuers, though wants to run it be family first.     Review of Systems   Reason unable to perform ROS: dementia.   Objective:     Vital Signs (Most Recent):  Temp: 98.5 °F (36.9 °C) (02/03/23 0718)  Pulse: 96 (02/03/23 0718)  Resp: 20 (02/03/23 0718)  BP: (!) 126/58 (02/03/23 0718)  SpO2: 96 % (02/03/23 0831)   Vital Signs (24h Range):  Temp:  [97.7 °F (36.5 °C)-99.7 °F (37.6 °C)] 98.5 °F (36.9 °C)  Pulse:  [] 96  Resp:  [16-21] 20  SpO2:  [95 %-96 %] 96 %  BP: (104-135)/(56-82) 126/58     Weight: 52.2 kg (115 lb 1.3 oz)  Body mass index is 21.05 kg/m².    Intake/Output Summary (Last 24 hours) at 2/3/2023 1013  Last data filed at 2/3/2023 0715  Gross per 24 hour   Intake 912 ml   Output 1350 ml   Net -438 ml        Physical Exam  Vitals reviewed.   Constitutional:       Appearance: Normal appearance. She is ill-appearing and toxic-appearing.   HENT:      Head: Normocephalic and atraumatic.   Cardiovascular:      Rate and Rhythm: Regular rhythm. Tachycardia present.       Pulses: Normal pulses.      Heart sounds: Murmur heard.   Pulmonary:      Effort: Pulmonary effort is normal.      Breath sounds: Normal breath sounds.   Abdominal:      General: Abdomen is flat.      Palpations: Abdomen is soft.   Musculoskeletal:         General: Tenderness present.   Skin:     General: Skin is warm and dry.      Capillary Refill: Capillary refill takes less than 2 seconds.   Neurological:      General: No focal deficit present.      Mental Status: She is alert. She is disoriented.       Significant Labs: All pertinent labs within the past 24 hours have been reviewed.    Significant Imaging: I have reviewed all pertinent imaging results/findings within the past 24 hours.      Assessment/Plan:      * Intertrochanteric fracture of left femur  Patient is on eliquis and recommend at least 24h off before surgery.    I see no medical reason why patient cannot proceed to surgery.     Per AHA RCRI she has a less than 2.5% change of perioperative cardiac risk.    High risk is greater than 1%. Patient is not in pulmonary edema or with unstable angina or valvular hear disease.   Agree with pain control and rehab. Will place 5 lbs Worrell's traction.    Will place escobar and check UA and treat any bacteruria.  Will treat if clinically indicated.        Encephalopathy, metabolic  Suspect realted to medications  Stop benzos, minimal narcotics, stop sleep aids    Questionable spot on CTH, but patient more awake. Hold on MRI for now  NGT    Even more awake today and requesting water-if continues to improve can pull NGT and hopefully discharge    2/2-no improvement in mentation, will get MRI brain to further evaluate.     Fall        Alzheimer's dementia with anxiety    Continue aricept and namenda combination.       GERD (gastroesophageal reflux disease)  Continue PPI      Hypertension    Continue home antihypertensive agents    Mixed hyperlipidemia  Continue home statin.        Paroxysmal atrial  fibrillation  Patient with Long standing persistent (>12 months) atrial fibrillation which is controlled currently with Beta Blocker. Patient is currently in atrial fibrillation.[id number] Score: 3. HASBLED Score: 1. Anticoagulation indicated. Anticoagulation done with eliquis     Eliquis dilt        Thyroid disease  Continue home synthroid.         VTE Risk Mitigation (From admission, onward)         Ordered     apixaban tablet 2.5 mg  2 times daily         01/31/23 1102     IP VTE HIGH RISK PATIENT  Once         01/27/23 1549     Place JETHRO hose  Until discontinued         01/27/23 1549     Place sequential compression device  Until discontinued         01/27/23 1549     Place sequential compression device  Until discontinued         01/26/23 2159                Discharge Planning   CARLITOS:      Code Status: DNR   Is the patient medically ready for discharge?:     Reason for patient still in hospital (select all that apply): Treatment  Discharge Plan A: Other ([facility])                  Rehmat U MD Brinda  Department of Hospital Medicine   [facility]

## 2023-02-03 NOTE — PT/OT/SLP PROGRESS
Occupational Therapy      Patient Name:  Kymberly Franco   MRN:  [id number]    Patient not seen today secondary to Other (Comment) (OT to hold and discontinue therapy per family d/t pt transitioning to hospice care).     2/3/2023

## 2023-02-04 NOTE — PROGRESS NOTES
[facility]  [facility] Medicine  Progress Note    Patient Name: Kymberly Franco  MRN: [id number]  Patient Class: IP- Inpatient   Admission Date: 1/26/2023  Length of Stay: 9 days  Attending Physician: Arlen Parry MD  Primary Care Provider: Andry Galarza MD        Subjective:     Principal Problem:Intertrochanteric fracture of left femur        HPI:  89 yo F presents to the ED after an accidental fall at home.  She hit her head and has LLE pain.  She is on eliquis for CAF and head CT negative for hemorrhage but xray of left hip and pelvis showed an intertrochanteric fracture.  Dr. Cheung has been following her after a fall and suffering a right humeral  fracture.  Her daughter, Stephanie Ramos who works in [employer] here at [facility], says that she has a very unsteady gait and fall frequently.  She did not have focal weakness or episode of syncope.      Patient is very [place] and has some mild dementia.  Her daughter does not report any complaints of CP, SOB, palpitations or N/V/D.      Overview/Hospital Course:  1/27-no complaints today. NPO for OR  1/28- the patient underwent repair of left femur fracture yesterday, which went well.  She seems to have an acute mental status change this a.m. being very hard to arouse and elicit a response.  An RRT was called on the patient's a.m..  Chest x-ray shows left lower lobe infiltrate possible pneumonia.  The patient had sedation for surgery as well as trazodone last night.  Possible lingering affects.  Added metronidazole with ceftriaxone for possible aspiration pneumonia.  Patient has a penicillin allergy.  1/29- patient was seen examined today resting comfortably in bed, in no acute distress.  She continues to be very somnolent.  Family states she still has not woken up very much and has not been awake enough to eat or drink over the past 24 hours.  However, she is more arousable today and will open her eyes on command and will murmur when spoken to.   This is an improvement over yesterday's exam.  Ammonia level was negative yesterday.  Patient on appropriate antibiotics for aspiration pneumonia with a penicillin allergy.  Today, we will change her fluids and increase the rate slightly.  Patient is slowly improving.   1/30-CTH today for AMS.  Suspect this is related to medication.  MAYTE as well.  Will increase IVF as she is not eating well.  If she does not awaken soon will need NGT for enteral feeding.  1/31-more awake today, still not eating. Will place NGT  2/1-continues to be more awake and family says she is now asking for water.  Can pull NGT once awake enough to eat.  I believe this is related to medication stacking with anesthesia with her underlying dementia.  Improving.       Interval History:     Pt w/ fevers, restart rocpehen, blood and urine cx  Daughter wants to transition care to hospice and comfort Monday , awiating for brother to fly to [place]. Pt appears calm and comfortable.     Review of Systems   Reason unable to perform ROS: dementia.   Objective:     Vital Signs (Most Recent):  Temp: 100 °F (37.8 °C) (02/04/23 0600)  Pulse: 104 (02/04/23 0600)  Resp: 18 (02/04/23 0600)  BP: 128/62 (02/04/23 0600)  SpO2: 95 % (02/04/23 0906)   Vital Signs (24h Range):  Temp:  [98.1 °F (36.7 °C)-100.5 °F (38.1 °C)] 100 °F (37.8 °C)  Pulse:  [] 104  Resp:  [18-20] 18  SpO2:  [90 %-95 %] 95 %  BP: (112-152)/(58-95) 128/62     Weight: 52.2 kg (115 lb 1.3 oz)  Body mass index is 21.05 kg/m².    Intake/Output Summary (Last 24 hours) at 2/4/2023 1037  Last data filed at 2/4/2023 0624  Gross per 24 hour   Intake 1671 ml   Output 650 ml   Net 1021 ml        Physical Exam  Vitals reviewed.   Constitutional:       Appearance: Normal appearance. She is ill-appearing and toxic-appearing.   HENT:      Head: Normocephalic and atraumatic.   Cardiovascular:      Rate and Rhythm: Regular rhythm. Tachycardia present.      Pulses: Normal pulses.      Heart sounds: Murmur  heard.   Pulmonary:      Effort: Pulmonary effort is normal.      Breath sounds: Normal breath sounds.   Abdominal:      General: Abdomen is flat.      Palpations: Abdomen is soft.   Musculoskeletal:         General: Tenderness present.   Skin:     General: Skin is warm and dry.      Capillary Refill: Capillary refill takes less than 2 seconds.   Neurological:      General: No focal deficit present.      Mental Status: She is alert. She is disoriented.       Significant Labs: All pertinent labs within the past 24 hours have been reviewed.    Significant Imaging: I have reviewed all pertinent imaging results/findings within the past 24 hours.      Assessment/Plan:      * Intertrochanteric fracture of left femur  Patient is on eliquis and recommend at least 24h off before surgery.    I see no medical reason why patient cannot proceed to surgery.     Per AHA RCRI she has a less than 2.5% change of perioperative cardiac risk.    High risk is greater than 1%. Patient is not in pulmonary edema or with unstable angina or valvular hear disease.   Agree with pain control and rehab. Will place 5 lbs Worrell's traction.    Will place escobar and check UA and treat any bacteruria.  Will treat if clinically indicated.        Encephalopathy, metabolic  Suspect realted to medications  Stop benzos, minimal narcotics, stop sleep aids    Questionable spot on CTH, but patient more awake. Hold on MRI for now  NGT    Even more awake today and requesting water-if continues to improve can pull NGT and hopefully discharge    2/2-no improvement in mentation, will get MRI brain to further evaluate.     Fall        Alzheimer's dementia with anxiety    Continue aricept and namenda combination.       GERD (gastroesophageal reflux disease)  Continue PPI      Hypertension    Continue home antihypertensive agents    Mixed hyperlipidemia  Continue home statin.        Paroxysmal atrial fibrillation  Patient with Long standing persistent (>12 months)  atrial fibrillation which is controlled currently with Beta Blocker. Patient is currently in atrial fibrillation.[id number] Score: 3. HASBLED Score: 1. Anticoagulation indicated. Anticoagulation done with eliquis     Eliquis dilt        Thyroid disease  Continue home synthroid.         VTE Risk Mitigation (From admission, onward)         Ordered     apixaban tablet 2.5 mg  2 times daily         01/31/23 1102     IP VTE HIGH RISK PATIENT  Once         01/27/23 1549     Place JETHRO hose  Until discontinued         01/27/23 1549     Place sequential compression device  Until discontinued         01/27/23 1549     Place sequential compression device  Until discontinued         01/26/23 2159                Discharge Planning   CARLITOS:      Code Status: DNR   Is the patient medically ready for discharge?:     Reason for patient still in hospital (select all that apply): Treatment  Discharge Plan A: Other ([facility])                  Rehmat U MD Brinda  Department of Hospital Medicine   [facility]

## 2023-02-04 NOTE — SUBJECTIVE & OBJECTIVE
Interval History:     Pt w/ fevers, restart rocpehen, blood and urine cx  Daughter wants to transition care to hospice and comfort Monday , awiating for brother to fly to [place]. Pt appears calm and comfortable.     Review of Systems   Reason unable to perform ROS: dementia.   Objective:     Vital Signs (Most Recent):  Temp: 100 °F (37.8 °C) (02/04/23 0600)  Pulse: 104 (02/04/23 0600)  Resp: 18 (02/04/23 0600)  BP: 128/62 (02/04/23 0600)  SpO2: 95 % (02/04/23 0906)   Vital Signs (24h Range):  Temp:  [98.1 °F (36.7 °C)-100.5 °F (38.1 °C)] 100 °F (37.8 °C)  Pulse:  [] 104  Resp:  [18-20] 18  SpO2:  [90 %-95 %] 95 %  BP: (112-152)/(58-95) 128/62     Weight: 52.2 kg (115 lb 1.3 oz)  Body mass index is 21.05 kg/m².    Intake/Output Summary (Last 24 hours) at 2/4/2023 1037  Last data filed at 2/4/2023 0624  Gross per 24 hour   Intake 1671 ml   Output 650 ml   Net 1021 ml        Physical Exam  Vitals reviewed.   Constitutional:       Appearance: Normal appearance. She is ill-appearing and toxic-appearing.   HENT:      Head: Normocephalic and atraumatic.   Cardiovascular:      Rate and Rhythm: Regular rhythm. Tachycardia present.      Pulses: Normal pulses.      Heart sounds: Murmur heard.   Pulmonary:      Effort: Pulmonary effort is normal.      Breath sounds: Normal breath sounds.   Abdominal:      General: Abdomen is flat.      Palpations: Abdomen is soft.   Musculoskeletal:         General: Tenderness present.   Skin:     General: Skin is warm and dry.      Capillary Refill: Capillary refill takes less than 2 seconds.   Neurological:      General: No focal deficit present.      Mental Status: She is alert. She is disoriented.       Significant Labs: All pertinent labs within the past 24 hours have been reviewed.    Significant Imaging: I have reviewed all pertinent imaging results/findings within the past 24 hours.

## 2023-02-05 NOTE — NURSING
"Wrote Dr. Kaplan via secure chat at 1947 and informed him that pt had order to obtain UA.  Pt is DNR and has had current escobar since 1/26/2023.  Asked Dr. Kaplan if current escobar needed to be removed and replaced to obtain UA or if UA needed to be obtained from current escobar.  Per Dr. Kaplan " Place a new one please."  Current escobar removed and new escobar replaced.  Pt tolerated well.  Specimen was sent to lab.   "

## 2023-02-05 NOTE — SUBJECTIVE & OBJECTIVE
Interval History:     Changed to cefempime overnight d/t continued fevers, fever curve has now gone down  Pt worsening clinically , family aware, plans for hospice ultimately, wainting on a brother who is traveling from another city.     Review of Systems   Reason unable to perform ROS: dementia.   Objective:     Vital Signs (Most Recent):  Temp: 99.9 °F (37.7 °C) (02/05/23 0400)  Pulse: 84 (02/05/23 0400)  Resp: 16 (02/05/23 0400)  BP: (!) 126/54 (02/05/23 0400)  SpO2: 96 % (02/05/23 0400)   Vital Signs (24h Range):  Temp:  [99.1 °F (37.3 °C)-101.1 °F (38.4 °C)] 99.9 °F (37.7 °C)  Pulse:  [] 84  Resp:  [16-18] 16  SpO2:  [93 %-96 %] 96 %  BP: (113-142)/(54-62) 126/54     Weight: 52.2 kg (115 lb 1.3 oz)  Body mass index is 21.05 kg/m².    Intake/Output Summary (Last 24 hours) at 2/5/2023 1010  Last data filed at 2/5/2023 0749  Gross per 24 hour   Intake 1801 ml   Output 770 ml   Net 1031 ml        Physical Exam  Vitals reviewed.   Constitutional:       Appearance: Normal appearance. She is ill-appearing and toxic-appearing.   HENT:      Head: Normocephalic and atraumatic.   Cardiovascular:      Rate and Rhythm: Regular rhythm. Tachycardia present.      Pulses: Normal pulses.      Heart sounds: Murmur heard.   Pulmonary:      Effort: Pulmonary effort is normal.      Breath sounds: Normal breath sounds.   Abdominal:      General: Abdomen is flat.      Palpations: Abdomen is soft.   Musculoskeletal:         General: Tenderness present.   Skin:     General: Skin is warm and dry.      Capillary Refill: Capillary refill takes less than 2 seconds.   Neurological:      General: No focal deficit present.      Mental Status: She is alert. She is disoriented.       Significant Labs: All pertinent labs within the past 24 hours have been reviewed.    Significant Imaging: I have reviewed all pertinent imaging results/findings within the past 24 hours.

## 2023-02-05 NOTE — PROGRESS NOTES
[facility]  [facility] Medicine  Progress Note    Patient Name: Kymberly Franco  MRN: [id number]  Patient Class: IP- Inpatient   Admission Date: 1/26/2023  Length of Stay: 10 days  Attending Physician: Arlen Parry MD  Primary Care Provider: Andry Galarza MD        Subjective:     Principal Problem:Intertrochanteric fracture of left femur        HPI:  89 yo F presents to the ED after an accidental fall at home.  She hit her head and has LLE pain.  She is on eliquis for CAF and head CT negative for hemorrhage but xray of left hip and pelvis showed an intertrochanteric fracture.  Dr. Cheung has been following her after a fall and suffering a right humeral  fracture.  Her daughter, Stephanie Ramos who works in [employer] here at [facility], says that she has a very unsteady gait and fall frequently.  She did not have focal weakness or episode of syncope.      Patient is very [place] and has some mild dementia.  Her daughter does not report any complaints of CP, SOB, palpitations or N/V/D.      Overview/Hospital Course:  1/27-no complaints today. NPO for OR  1/28- the patient underwent repair of left femur fracture yesterday, which went well.  She seems to have an acute mental status change this a.m. being very hard to arouse and elicit a response.  An RRT was called on the patient's a.m..  Chest x-ray shows left lower lobe infiltrate possible pneumonia.  The patient had sedation for surgery as well as trazodone last night.  Possible lingering affects.  Added metronidazole with ceftriaxone for possible aspiration pneumonia.  Patient has a penicillin allergy.  1/29- patient was seen examined today resting comfortably in bed, in no acute distress.  She continues to be very somnolent.  Family states she still has not woken up very much and has not been awake enough to eat or drink over the past 24 hours.  However, she is more arousable today and will open her eyes on command and will murmur when spoken to.   This is an improvement over yesterday's exam.  Ammonia level was negative yesterday.  Patient on appropriate antibiotics for aspiration pneumonia with a penicillin allergy.  Today, we will change her fluids and increase the rate slightly.  Patient is slowly improving.   1/30-CTH today for AMS.  Suspect this is related to medication.  MAYTE as well.  Will increase IVF as she is not eating well.  If she does not awaken soon will need NGT for enteral feeding.  1/31-more awake today, still not eating. Will place NGT  2/1-continues to be more awake and family says she is now asking for water.  Can pull NGT once awake enough to eat.  I believe this is related to medication stacking with anesthesia with her underlying dementia.  Improving.       Interval History:     Changed to cefempime overnight d/t continued fevers, fever curve has now gone down  Pt worsening clinically , family aware, plans for hospice ultimately, wainting on a brother who is traveling from another city.     Review of Systems   Reason unable to perform ROS: dementia.   Objective:     Vital Signs (Most Recent):  Temp: 99.9 °F (37.7 °C) (02/05/23 0400)  Pulse: 84 (02/05/23 0400)  Resp: 16 (02/05/23 0400)  BP: (!) 126/54 (02/05/23 0400)  SpO2: 96 % (02/05/23 0400)   Vital Signs (24h Range):  Temp:  [99.1 °F (37.3 °C)-101.1 °F (38.4 °C)] 99.9 °F (37.7 °C)  Pulse:  [] 84  Resp:  [16-18] 16  SpO2:  [93 %-96 %] 96 %  BP: (113-142)/(54-62) 126/54     Weight: 52.2 kg (115 lb 1.3 oz)  Body mass index is 21.05 kg/m².    Intake/Output Summary (Last 24 hours) at 2/5/2023 1010  Last data filed at 2/5/2023 0749  Gross per 24 hour   Intake 1801 ml   Output 770 ml   Net 1031 ml        Physical Exam  Vitals reviewed.   Constitutional:       Appearance: Normal appearance. She is ill-appearing and toxic-appearing.   HENT:      Head: Normocephalic and atraumatic.   Cardiovascular:      Rate and Rhythm: Regular rhythm. Tachycardia present.      Pulses: Normal pulses.       Heart sounds: Murmur heard.   Pulmonary:      Effort: Pulmonary effort is normal.      Breath sounds: Normal breath sounds.   Abdominal:      General: Abdomen is flat.      Palpations: Abdomen is soft.   Musculoskeletal:         General: Tenderness present.   Skin:     General: Skin is warm and dry.      Capillary Refill: Capillary refill takes less than 2 seconds.   Neurological:      General: No focal deficit present.      Mental Status: She is alert. She is disoriented.       Significant Labs: All pertinent labs within the past 24 hours have been reviewed.    Significant Imaging: I have reviewed all pertinent imaging results/findings within the past 24 hours.      Assessment/Plan:      * Intertrochanteric fracture of left femur  Patient is on eliquis and recommend at least 24h off before surgery.    I see no medical reason why patient cannot proceed to surgery.     Per AHA RCRI she has a less than 2.5% change of perioperative cardiac risk.    High risk is greater than 1%. Patient is not in pulmonary edema or with unstable angina or valvular hear disease.   Agree with pain control and rehab. Will place 5 lbs Worrell's traction.    Will place escobar and check UA and treat any bacteruria.  Will treat if clinically indicated.        Encephalopathy, metabolic  Suspect realted to medications  Stop benzos, minimal narcotics, stop sleep aids    Questionable spot on CTH, but patient more awake. Hold on MRI for now  NGT    Even more awake today and requesting water-if continues to improve can pull NGT and hopefully discharge    2/2-no improvement in mentation, will get MRI brain to further evaluate.     Fall        Alzheimer's dementia with anxiety    Continue aricept and namenda combination.       GERD (gastroesophageal reflux disease)  Continue PPI      Hypertension    Continue home antihypertensive agents    Mixed hyperlipidemia  Continue home statin.        Paroxysmal atrial fibrillation  Patient with Long standing  persistent (>12 months) atrial fibrillation which is controlled currently with Beta Blocker. Patient is currently in atrial fibrillation.[id number] Score: 3. HASBLED Score: 1. Anticoagulation indicated. Anticoagulation done with eliquis     Eliquis dilt        Thyroid disease  Continue home synthroid.         VTE Risk Mitigation (From admission, onward)         Ordered     apixaban tablet 2.5 mg  2 times daily         01/31/23 1102     IP VTE HIGH RISK PATIENT  Once         01/27/23 1549     Place JETHRO hose  Until discontinued         01/27/23 1549     Place sequential compression device  Until discontinued         01/27/23 1549     Place sequential compression device  Until discontinued         01/26/23 2159                Discharge Planning   CARLITOS:      Code Status: DNR   Is the patient medically ready for discharge?:     Reason for patient still in hospital (select all that apply): Treatment  Discharge Plan A: Other ([facility] [place])                  Rehmat U MD Brinda  Department of Hospital Medicine   [facility]

## 2023-02-05 NOTE — PLAN OF CARE
Problem: Infection  Goal: Absence of Infection Signs and Symptoms  Outcome: Ongoing, Progressing     Problem: Adult Inpatient Plan of Care  Goal: Plan of Care Review  Outcome: Ongoing, Progressing  Goal: Patient-Specific Goal (Individualized)  Outcome: Ongoing, Progressing  Goal: Absence of Hospital-Acquired Illness or Injury  Outcome: Ongoing, Progressing  Goal: Optimal Comfort and Wellbeing  Outcome: Ongoing, Progressing  Goal: Readiness for Transition of Care  Outcome: Ongoing, Progressing     Problem: Impaired Wound Healing  Goal: Optimal Wound Healing  Outcome: Ongoing, Progressing     Problem: Skin Injury Risk Increased  Goal: Skin Health and Integrity  Outcome: Ongoing, Progressing     Problem: Fall Injury Risk  Goal: Absence of Fall and Fall-Related Injury  Outcome: Ongoing, Progressing     Problem: Aspiration (Enteral Nutrition)  Goal: Absence of Aspiration Signs and Symptoms  Outcome: Ongoing, Progressing     Problem: Feeding Intolerance (Enteral Nutrition)  Goal: Feeding Tolerance  Outcome: Ongoing, Progressing     Problem: Device-Related Complication Risk (Enteral Nutrition)  Goal: Safe, Effective Therapy Delivery  Outcome: Ongoing, Progressing

## 2023-02-06 PROBLEM — A41.9 SEPSIS: Status: ACTIVE | Noted: 2023-01-01

## 2023-02-06 PROBLEM — I63.9 CVA (CEREBRAL VASCULAR ACCIDENT): Status: ACTIVE | Noted: 2023-01-01

## 2023-02-06 NOTE — DISCHARGE SUMMARY
[facility]  [facility] Medicine  Discharge Summary      Patient Name: Kymberly Franco  MRN: [id number]  NERY: [id number]  Patient Class: IP- Inpatient  Admission Date: 1/26/2023  Hospital Length of Stay: 11 days  Discharge Date and Time:  02/06/2023 9:34 AM  Attending Physician: Arlen Parry MD   Discharging Provider: SUSHANT Amado  Primary Care Provider: Andry Galarza MD    Primary Care Team: Networked reference to record PCT     HPI:   89 yo F presents to the ED after an accidental fall at home.  She hit her head and has LLE pain.  She is on eliquis for CAF and head CT negative for hemorrhage but xray of left hip and pelvis showed an intertrochanteric fracture.  Dr. Cheung has been following her after a fall and suffering a right humeral  fracture.  Her daughter, Stephanie Ramos who works in [employer] here at [facility], says that she has a very unsteady gait and fall frequently.  She did not have focal weakness or episode of syncope.      Patient is very [place] and has some mild dementia.  Her daughter does not report any complaints of CP, SOB, palpitations or N/V/D.      Procedure(s) (LRB):  INSERTION, INTRAMEDULLARY THANG, FEMUR, TROCHANTER (Left)      Hospital Course:   1/27-no complaints today. NPO for OR  1/28- the patient underwent repair of left femur fracture yesterday, which went well.  She seems to have an acute mental status change this a.m. being very hard to arouse and elicit a response.  An RRT was called on the patient's a.m..  Chest x-ray shows left lower lobe infiltrate possible pneumonia.  The patient had sedation for surgery as well as trazodone last night.  Possible lingering affects.  Added metronidazole with ceftriaxone for possible aspiration pneumonia.  Patient has a penicillin allergy.  1/29- patient was seen examined today resting comfortably in bed, in no acute distress.  She continues to be very somnolent.  Family states she still has not woken up very much and  "has not been awake enough to eat or drink over the past 24 hours.  However, she is more arousable today and will open her eyes on command and will murmur when spoken to.  This is an improvement over yesterday's exam.  Ammonia level was negative yesterday.  Patient on appropriate antibiotics for aspiration pneumonia with a penicillin allergy.  Today, we will change her fluids and increase the rate slightly.  Patient is slowly improving.   1/30-CTH today for AMS.  Suspect this is related to medication.  MAYTE as well.  Will increase IVF as she is not eating well.  If she does not awaken soon will need NGT for enteral feeding.  1/31-more awake today, still not eating. Will place NGT  2/1-continues to be more awake and family says she is now asking for water.  Can pull NGT once awake enough to eat.  I believe this is related to medication stacking with anesthesia with her underlying dementia.  Improving.     02/05--"Changed to cefempime overnight d/t continued fevers, fever curve has now gone down  Pt worsening clinically , family aware, plans for hospice ultimately, wainting on a brother who is traveling from another city."    02/06--Pt accepted to [facility] for continued IV ABT, TF via NG and continued monitoring, family aware of pt status and are discussing hospice.         Goals of Care Treatment Preferences:  Code Status: DNR      Consults:   Consults (From admission, onward)        Status Ordering Provider     Inpatient consult to Social Work  Once        Provider:  (Not yet assigned)    Completed SYLVIA, REHMAT U     Consult to Telemedicine - Acute Stroke  Once        Provider:  (Not yet assigned)    Acknowledged SYLVIA, REHMAT U     Inpatient consult to Social Work  Once        Provider:  (Not yet assigned)    Completed SYLVIA, REHMAT U     Inpatient consult to Registered Dietitian/Nutritionist  Once        Provider:  (Not yet assigned)    Completed RED ASHFORD     IP consult case management/social work  Once      "   Provider:  (Not yet assigned)    Completed ASAD MEIER     Inpatient consult to Orthopedic Surgery  Once        Provider:  (Not yet assigned)    Acknowledged SAVANNAH LUEVANO          * Intertrochanteric fracture of left femur  Patient is on eliquis and recommend at least 24h off before surgery.    I see no medical reason why patient cannot proceed to surgery.     Per AHA RCRI she has a less than 2.5% change of perioperative cardiac risk.    High risk is greater than 1%. Patient is not in pulmonary edema or with unstable angina or valvular hear disease.   Agree with pain control and rehab. Will place 5 lbs Worrell's traction.    Will place escobar and check UA and treat any bacteruria.  Will treat if clinically indicated.        Encephalopathy, metabolic  Suspect realted to medications  Stop benzos, minimal narcotics, stop sleep aids    Questionable spot on CTH, but patient more awake. Hold on MRI for now  NGT    Even more awake today and requesting water-if continues to improve can pull NGT and hopefully discharge    2/2-no improvement in mentation, will get MRI brain to further evaluate.     Fall        Alzheimer's dementia with anxiety    Continue aricept and namenda combination.       GERD (gastroesophageal reflux disease)  Continue PPI      Hypertension    Continue home antihypertensive agents    Mixed hyperlipidemia  Continue home statin.        Paroxysmal atrial fibrillation  Patient with Long standing persistent (>12 months) atrial fibrillation which is controlled currently with Beta Blocker. Patient is currently in atrial fibrillation.[id number] Score: 3. HASBLED Score: 1. Anticoagulation indicated. Anticoagulation done with eliquis     Eliquis dilt        Thyroid disease  Continue home synthroid.         Final Active Diagnoses:    Diagnosis Date Noted POA    PRINCIPAL PROBLEM:  Intertrochanteric fracture of left femur [S72.142A] 01/27/2023 Yes    Encephalopathy, metabolic [G93.41] 01/30/2023 Yes     Fall [W19.XXXA] 01/27/2023 Yes    Thyroid disease [E07.9]  Yes    Paroxysmal atrial fibrillation [I48.0]  Yes    Mixed hyperlipidemia [E78.2]  Yes    Hypertension [I10]  Yes    GERD (gastroesophageal reflux disease) [K21.9]  Yes    Alzheimer's dementia with anxiety [G30.9, F02.84]  Yes      Problems Resolved During this Admission:    Diagnosis Date Noted Date Resolved POA    Pre-op chest exam [Z01.811] 01/27/2023 01/27/2023 Not Applicable       Discharged Condition: poor    Disposition:     Follow Up:   Follow-up Information     [facility] Follow up.    Contact information:  [address] [place] [id number] [phone]                     Patient Instructions:      Diet NPO     Notify your health care provider if you experience any of the following:  temperature >100.4     Notify your health care provider if you experience any of the following:  persistent nausea and vomiting or diarrhea     Notify your health care provider if you experience any of the following:  severe uncontrolled pain     Notify your health care provider if you experience any of the following:  redness, tenderness, or signs of infection (pain, swelling, redness, odor or green/yellow discharge around incision site)     Notify your health care provider if you experience any of the following:  difficulty breathing or increased cough     Notify your health care provider if you experience any of the following:  severe persistent headache     Notify your health care provider if you experience any of the following:  worsening rash     Notify your health care provider if you experience any of the following:  persistent dizziness, light-headedness, or visual disturbances     Notify your health care provider if you experience any of the following:  increased confusion or weakness     Activity as tolerated       Significant Diagnostic Studies: Labs:   BMP:   Recent Labs   Lab 02/05/23  0410 02/06/23  0421    * 137*   * 153*   K 5.3* 5.5*   * 118*   CO2 27 27   BUN 46* 47*   CREATININE 1.11* 1.04*   CALCIUM 8.4* 8.3*    and CBC   Recent Labs   Lab 02/05/23  0411 02/06/23  0427   WBC 12.41* 10.69   HGB 8.2* 9.1*   HCT 28.7* 31.5*    287       Pending Diagnostic Studies:     Procedure Component Value Units Date/Time    EXTRA TUBES [[id number]] Collected: 01/31/23 0811    Order Status: Sent Lab Status: In process Updated: 01/31/23 0819    Specimen: Blood, Venous     Narrative:      The following orders were created for panel order EXTRA TUBES.  Procedure                               Abnormality         Status                     ---------                               -----------         ------                     Lavender Top Hold[[id number]]                                In process                   Please view results for these tests on the individual orders.    EXTRA TUBES [[id number]] Collected: 01/28/23 1623    Order Status: Sent Lab Status: In process Updated: 01/28/23 1623    Specimen: Blood, Venous     Narrative:      The following orders were created for panel order EXTRA TUBES.  Procedure                               Abnormality         Status                     ---------                               -----------         ------                     Lavender Top Hold[[id number]]                                In process                   Please view results for these tests on the individual orders.    EXTRA TUBES [[id number]] Collected: 01/28/23 0709    Order Status: Sent Lab Status: In process Updated: 01/28/23 0727    Specimen: Blood, Venous     Narrative:      The following orders were created for panel order EXTRA TUBES.  Procedure                               Abnormality         Status                     ---------                               -----------         ------                     Light Green Top Hold[[id number]]                             In process                    Please view results for these tests on the individual orders.    EXTRA TUBES [[id number]] Collected: 01/28/23 0709    Order Status: Sent Lab Status: In process Updated: 01/28/23 0727    Specimen: Blood, Venous     Narrative:      The following orders were created for panel order EXTRA TUBES.  Procedure                               Abnormality         Status                     ---------                               -----------         ------                     Light Green Top Hold[[id number]]                             In process                   Please view results for these tests on the individual orders.    EXTRA TUBES [[id number]] Collected: 01/28/23 0709    Order Status: Sent Lab Status: In process Updated: 01/28/23 0726    Specimen: Blood, Venous     Narrative:      The following orders were created for panel order EXTRA TUBES.  Procedure                               Abnormality         Status                     ---------                               -----------         ------                     Lavender Top Hold[[id number]]                                In process                   Please view results for these tests on the individual orders.    EXTRA TUBES [[id number]] Collected: 01/26/23 2000    Order Status: Sent Lab Status: In process Updated: 01/26/23 2009    Specimen: Blood, Venous     Narrative:      The following orders were created for panel order EXTRA TUBES.  Procedure                               Abnormality         Status                     ---------                               -----------         ------                     Light Green Top Hold[[id number]]                             In process                 Lavender Top Hold[[id number]]                                In process                 Pink Top Hold[[id number]]                                    In process                   Please view results for these tests on the individual orders.         Medications:  Reconciled Home  Medications:      Medication List      START taking these medications    dextrose 5 % in water (D5W) 5 % PgBk 50 mL with ceFEPIme 1 gram SolR 1 g  Inject 1 g into the vein every 8 (eight) hours.        CHANGE how you take these medications    diltiaZEM 180 MG 24 hr capsule  Commonly known as: CARDIZEM CD  Take 1 capsule (180 mg total) by mouth once daily. Hold pending med admission to [facility]  What changed: additional instructions     ezetimibe-simvastatin 10-20 mg 10-20 mg per tablet  Commonly known as: VYTORIN  Hold pending med admission to [facility]    TAKE 1 TABLET BY MOUTH DAILY ..DO NOT EAT GRAPEFRUIT OR DRINK GRAPEFRUIT JUICE WHILE TAKING..  What changed: additional instructions     furosemide 40 MG tablet  Commonly known as: LASIX  Take 1 tablet (40 mg total) by mouth once daily. Hold pending med admission to [facility]  What changed: additional instructions     isosorbide mononitrate 60 MG 24 hr tablet  Commonly known as: IMDUR  Take 1 tablet (60 mg total) by mouth once daily. Hold pending med admission to [facility]  What changed: additional instructions     levothyroxine 112 MCG tablet  Commonly known as: SYNTHROID  Take 1 tablet (112 mcg total) by mouth before breakfast.  What changed: Another medication with the same name was removed. Continue taking this medication, and follow the directions you see here.     NAMZARIC 28-10 mg Cspx  Generic drug: memantine-donepeziL  Take 1 capsule by mouth every evening. Hold pending med admission to [facility]  What changed: additional instructions     nebivoloL 5 MG Tab  Commonly known as: BYSTOLIC  Take 1 tablet (5 mg total) by mouth every morning. Hold pending med admission to [facility]  What changed: additional instructions     olmesartan 20 MG tablet  Commonly known as: BENICAR  Take 1 tablet (20 mg total) by mouth once daily. Hold pending med admission to [facility]  What changed: additional instructions     * pantoprazole 40 mg injection  Commonly known as: PROTONIX  Inject 40 mg into the vein once  daily.  What changed: You were already taking a medication with the same name, and this prescription was added. Make sure you understand how and when to take each.     * pantoprazole 40 MG tablet  Commonly known as: PROTONIX  Hold pending med admission to [facility]  TAKE 1 TABLET BY MOUTH DAILY 30 MINUTES BEFORE A MEAL  What changed: additional instructions         * This list has 2 medication(s) that are the same as other medications prescribed for you. Read the directions carefully, and ask your doctor or other care provider to review them with you.            CONTINUE taking these medications    donepeziL 10 MG tablet  Commonly known as: ARICEPT  Take 1 tablet (10 mg total) by mouth every evening.     ELIQUIS 2.5 mg Tab  Generic drug: apixaban  Take 1 tablet (2.5 mg total) by mouth 2 (two) times daily.     memantine 28 mg Cspx  Commonly known as: NAMENDA XR  Take 1 capsule (28 mg total) by mouth once daily.     sertraline 50 MG tablet  Commonly known as: ZOLOFT  Take 1 tablet (50 mg total) by mouth once daily.        STOP taking these medications    azithromycin 250 MG tablet  Commonly known as: Z-MONSERRAT     cephALEXin 500 MG capsule  Commonly known as: KEFLEX     diazePAM 2 MG tablet  Commonly known as: VALIUM     potassium chloride SA 10 MEQ tablet  Commonly known as: K-DUR,KLOR-CON M            Indwelling Lines/Drains at time of discharge:   Lines/Drains/Airways     Drain  Duration                NG/OG Tube 01/31/23 1300 Left nostril 5 days         Urethral Catheter 02/04/23 1959 Silicone 16 Fr. 1 day                Time spent on the discharge of patient: >30 minutes         SUSHANT Amado  Department of Hospital Medicine  [facility]

## 2023-02-06 NOTE — NURSING
Notified dr douglas pt bp 129/75 hr 120-140s resp30 using accessory muscles  pt has NG tube to jevity 1.5 at35ml  o2 at 2L o2 sat 94% dr douglas assess pt no new orders but to continue to monitor pt

## 2023-02-06 NOTE — ASSESSMENT & PLAN NOTE
Patient with Long standing persistent (>12 months) atrial fibrillation which is controlled currently with Beta Blocker. Patient is currently in atrial fibrillation.[id number] Score: 3. HASBLED Score: 1. Anticoagulation indicated. Anticoagulation done with eliquis     Eliquis dilt

## 2023-02-06 NOTE — HPI
Pt is a 89 y/o f w/ recent fall resulting in L hip fracture, pt underwent surgical repair w/ orthopedic surgeon, post operatively pt did not waken up quite much. Had multiple episodes of hypotensive events post surgery. Initially it was though to be 2/2 over sedation vs pain medications, however MRI done revealed multiple infarcts, spoke to neurologist at [facility], who reviewed imaging and thought the infarcts more than likely represent d/t watershed areas being hypoperfused, which correlated w/ the timing post operatively when her bp was running very low. Per neurologist these types of infacrts occure d/t somone having a cardiac arrest or very low perfusing pressure. Neurologist just recommended supportive care. D/w family in detail, they understand she has a very poor prognosis and intend to do hospice/comfort measures ultimately until the patietns son arrives. Pt now having sepsis, likely d/t aspirating events. Pt will now be a/w sepsis to [facility] for further care.

## 2023-02-06 NOTE — PLAN OF CARE
[facility]  Discharge Final Note    Primary Care Provider: Andry Galarza MD    Expected Discharge Date: 2/6/2023    Final Discharge Note (most recent)       Final Note - 02/06/23 1108          Final Note    Assessment Type Final Discharge Note     Anticipated Discharge Disposition Long Term Acute Care        Post-Acute Status    Post-Acute Authorization Placement     Post-Acute Placement Status Set-up Complete/Auth obtained     Patient choice form signed by patient/caregiver List with quality metrics by geographic area provided;List from CMS Compare;List from System Post-Acute Care     Discharge Delays None known at this time                     Important Message from Medicare  Important Message from Medicare regarding Discharge Appeal Rights: Given to patient/caregiver, Explained to patient/caregiver, Signed/date by patient/caregiver     Date IMM was signed: 02/06/23  Time IMM was signed: 1105    Contact Info       [address] [place] [address]   Phone: [phone]       Next Steps: Follow up        SS was consulted on pt for LTAC. SS spoke with pt 's daughter and choice obtained for [facility]. Pt will discharge today

## 2023-02-06 NOTE — NURSING
Called report to [facility] at 1422. Will transfer when they call and say the room is clean.

## 2023-02-06 NOTE — PROGRESS NOTES
Pharmacist Renal Dose Adjustment Note    Kymberly Franco is a 89 y.o. female being treated with the medication famotidine    Patient Data:    Vital Signs (Most Recent):    Vital Signs (72h Range):  Temp:  [98.1 °F (36.7 °C)-101.1 °F (38.4 °C)]   Pulse:  []   Resp:  [16-30]   BP: ()/(54-95)   SpO2:  [92 %-97 %]      Recent Labs   Lab 02/04/23  0251 02/05/23  0410 02/06/23  0427   CREATININE 1.07* 1.11* 1.04*     Creatinine clearance cannot be calculated (Unknown ideal weight.)    Medication:famotidine dose: 20 mg frequency bid will be changed to medication:famotidine dose:20 mg frequency:daily    Pharmacist's Name: Annamarie Berrios  Pharmacist's Extension: [phone]

## 2023-02-06 NOTE — PROGRESS NOTES
Pharmacist Intervention IV to PO Note    Kymberly Franco is a 89 y.o. female being treated with IV medication famotidine    Patient Data:    Vital Signs (Most Recent):    Vital Signs (72h Range):  Temp:  [98.1 °F (36.7 °C)-101.1 °F (38.4 °C)]   Pulse:  []   Resp:  [16-30]   BP: ()/(54-95)   SpO2:  [92 %-97 %]      CBC:  Recent Labs   Lab 02/05/23  0411 02/06/23 0427   WBC 12.41* 10.69   RBC 2.88* 3.24*   HGB 8.2* 9.1*   HCT 28.7* 31.5*    287   MCV 99.7* 97.2*   MCH 28.5 28.1   MCHC 28.6* 28.9*     CMP:     Recent Labs   Lab 02/04/23  0251 02/05/23 0410 02/06/23 0427   * 153* 137*   CALCIUM 8.5 8.4* 8.3*   * 153* 153*   K 4.9 5.3* 5.5*   CO2 26 27 27   * 119* 118*   BUN 41* 46* 47*   CREATININE 1.07* 1.11* 1.04*       Dietary Orders:  Diet Orders            Tube Feedings/Formulas [facility]; Other; 0; Nasalgastric /Dobhoff; Other (see comments): Tube Feeding (I) starting at 02/06 0937            Based on the following criteria, this patient qualifies for intravenous to oral conversion:  [x] The patients gastrointestinal tract is functioning (tolerating medications via oral or enteral route for 24 hours and tolerating food or enteral feeds for 24 hours).    IV medication famotidine will be changed to oral medication famotidine    Pharmacist's Name: Annamarie Berrios  Pharmacist's Extension: [phone]

## 2023-02-06 NOTE — PHYSICIAN QUERY
PT Name: Kymberly Franco  MR #: [id number]     Documentation Clarification      CDS:  Sylvie MANUEL, RN   Contact information:  [email or web address]  Query withdrawn provider updated H&P with reqested diagnosis on 2/8/23    This form is a permanent document in the medical record.     Query Date: February 6, 2023    By submitting this query, we are merely seeking further clarification of documentation. Please utilize your independent clinical judgment when addressing the question(s) below.    The Medical Record reflects the following:    Supporting Clinical Findings Location in Medical Record   Leukocytes   Large     Nitrites          Positive   Blood         Small     WBC             15-25     RBC              3 -5     Bacteria        Many     Squamous Epithelial Cells  Few     WBC Clumps   Few       >100,000 Escherichia coli   UA 1/26/23                    Urine culture 1/26/23   Will place escobar and check UA and treat any bacteruria      cefTRIAXone (ROCEPHIN) 1 g in dextrose 5 % in water (D5W) 5 % 50 mL IVPB q 24 hours for 7 occurrences  clinical indications-uti     H&P 1/27/23 0133      MAR 1/26/23                                                                            Provider, please specify diagnosis associated with above clinical findings.    [   ] UTI    [   ] Asymptomatic bacteriuria   [   ] Other (please specify): ____________

## 2023-02-06 NOTE — SUBJECTIVE & OBJECTIVE
Past Medical History:   Diagnosis Date    Alzheimer's dementia with anxiety     Anticoagulant long-term use     GERD (gastroesophageal reflux disease)     Hypertension     Mixed hyperlipidemia     Paroxysmal atrial fibrillation     Thyroid disease        Past Surgical History:   Procedure Laterality Date    APPENDECTOMY      INTRAMEDULLARY RODDING OF FEMUR Left 01/27/2023    Dr. Andry Cheung    INTRAMEDULLARY RODDING OF TROCHANTER OF FEMUR Left 1/27/2023    Procedure: INSERTION, INTRAMEDULLARY THANG, FEMUR, TROCHANTER;  Surgeon: Andry Cheung MD;  Location: [facility];  Service: Orthopedics;  Laterality: Left;       Review of patient's allergies indicates:   Allergen Reactions    Cipro [ciprofloxacin hcl]     Pcn [penicillins] Hives and Itching       Current Facility-Administered Medications on File Prior to Encounter   Medication    [DISCONTINUED] acetaminophen tablet 1,000 mg    [DISCONTINUED] apixaban tablet 2.5 mg    [DISCONTINUED] bisacodyL EC tablet 10 mg    [DISCONTINUED] bisacodyL suppository 10 mg    [DISCONTINUED] ceFEPIme (MAXIPIME) 1 g in dextrose 5 % in water (D5W) 5 % 50 mL IVPB (MB+)    [DISCONTINUED] dextromethorphan-guaiFENesin  mg/5 ml liquid 10 mL    [DISCONTINUED] donepeziL tablet 10 mg    [DISCONTINUED] ibuprofen tablet 400 mg    [DISCONTINUED] labetaloL injection 20 mg    [DISCONTINUED] levothyroxine tablet 112 mcg    [DISCONTINUED] memantine tablet 10 mg    [DISCONTINUED] metoprolol injection 5 mg    [DISCONTINUED] ondansetron injection 4 mg    [DISCONTINUED] ondansetron injection 4 mg    [DISCONTINUED] ondansetron injection 8 mg    [DISCONTINUED] oxyCODONE immediate release tablet 5 mg    [DISCONTINUED] pantoprazole injection 40 mg    [DISCONTINUED] sertraline tablet 50 mg    [DISCONTINUED] simethicone chewable tablet 80 mg    [DISCONTINUED] sodium chloride 0.9% bolus 500 mL 500 mL     Current Outpatient Medications on File Prior to Encounter   Medication Sig    diltiaZEM  (CARDIZEM CD) 180 MG 24 hr capsule Take 1 capsule (180 mg total) by mouth once daily. Hold pending med admission to [facility]    donepeziL (ARICEPT) 10 MG tablet Take 1 tablet (10 mg total) by mouth every evening.    ELIQUIS 2.5 mg Tab Take 1 tablet (2.5 mg total) by mouth 2 (two) times daily.    ezetimibe-simvastatin 10-20 mg (VYTORIN) 10-20 mg per tablet Hold pending med admission to [facility]    TAKE 1 TABLET BY MOUTH DAILY ..DO NOT EAT GRAPEFRUIT OR DRINK GRAPEFRUIT JUICE WHILE TAKING..    furosemide (LASIX) 40 MG tablet Take 1 tablet (40 mg total) by mouth once daily. Hold pending med admission to [facility]    isosorbide mononitrate (IMDUR) 60 MG 24 hr tablet Take 1 tablet (60 mg total) by mouth once daily. Hold pending med admission to [facility]    levothyroxine (SYNTHROID) 112 MCG tablet Take 1 tablet (112 mcg total) by mouth before breakfast.    memantine (NAMENDA XR) 28 mg CSpX Take 1 capsule (28 mg total) by mouth once daily.    NAMZARIC 28-10 mg CSpX Take 1 capsule by mouth every evening. Hold pending med admission to [facility]    nebivoloL (BYSTOLIC) 5 MG Tab Take 1 tablet (5 mg total) by mouth every morning. Hold pending med admission to [facility]    olmesartan (BENICAR) 20 MG tablet Take 1 tablet (20 mg total) by mouth once daily. Hold pending med admission to [facility]    pantoprazole (PROTONIX) 40 MG tablet Hold pending med admission to [facility]  TAKE 1 TABLET BY MOUTH DAILY 30 MINUTES BEFORE A MEAL    sertraline (ZOLOFT) 50 MG tablet Take 1 tablet (50 mg total) by mouth once daily.    [DISCONTINUED] azithromycin (Z-MONSERRAT) 250 MG tablet Take 1 tablet (250 mg total) by mouth once daily.    [DISCONTINUED] cephALEXin (KEFLEX) 500 MG capsule Take 1 capsule (500 mg total) by mouth every 12 (twelve) hours.    [DISCONTINUED] dextrose 5 % in water (D5W) 5 % PgBk 50 mL with ceFEPIme 1 gram SolR 1 g Inject 1 g into the vein every 8 (eight) hours.    [DISCONTINUED] diazePAM (VALIUM) 2 MG tablet TAKE 1 TABLET BY MOUTH EACH NIGHT AT BEDTIME ..MAY CAUSE  DROWSINESS.. NO ALCOHOL    [DISCONTINUED] diltiaZEM (CARDIZEM CD) 180 MG 24 hr capsule Take 1 capsule (180 mg total) by mouth once daily.    [DISCONTINUED] ezetimibe-simvastatin 10-20 mg (VYTORIN) 10-20 mg per tablet TAKE 1 TABLET BY MOUTH DAILY ..DO NOT EAT GRAPEFRUIT OR DRINK GRAPEFRUIT JUICE WHILE TAKING..    [DISCONTINUED] furosemide (LASIX) 40 MG tablet Take 1 tablet (40 mg total) by mouth once daily.    [DISCONTINUED] isosorbide mononitrate (IMDUR) 60 MG 24 hr tablet Take 1 tablet (60 mg total) by mouth once daily.    [DISCONTINUED] levothyroxine (SYNTHROID) 100 MCG tablet TAKE 1 TABLET BY MOUTH DAILY ON EMPTY STOMACH    [DISCONTINUED] NAMZARIC 28-10 mg CSpX Take 1 capsule by mouth every evening.    [DISCONTINUED] nebivoloL (BYSTOLIC) 5 MG Tab Take 1 tablet (5 mg total) by mouth every morning.    [DISCONTINUED] olmesartan (BENICAR) 20 MG tablet Take 1 tablet (20 mg total) by mouth once daily.    [DISCONTINUED] pantoprazole (PROTONIX) 40 mg injection Inject 40 mg into the vein once daily.    [DISCONTINUED] pantoprazole (PROTONIX) 40 MG tablet TAKE 1 TABLET BY MOUTH DAILY 30 MINUTES BEFORE A MEAL    [DISCONTINUED] potassium chloride SA (K-DUR,KLOR-CON M) 10 MEQ tablet Take 1 tablet (10 mEq total) by mouth once daily.     Family History       Problem Relation (Age of Onset)    No Known Problems Mother, Father          Tobacco Use    Smoking status: Never    Smokeless tobacco: Never   Substance and Sexual Activity    Alcohol use: Never    Drug use: Never    Sexual activity: Not Currently     Review of Systems   Unable to perform ROS: Acuity of condition   Objective:     Vital Signs (Most Recent):  Temp: 98.9 °F (37.2 °C) (02/06/23 1614)  Pulse: (!) 136 (02/06/23 1614)  Resp: (!) 34 (02/06/23 1614)  BP: 136/74 (02/06/23 1614)  SpO2: 96 % (02/06/23 1614)   Vital Signs (24h Range):  Temp:  [98.3 °F (36.8 °C)-98.9 °F (37.2 °C)] 98.9 °F (37.2 °C)  Pulse:  [119-140] 136  Resp:  [20-34] 34  SpO2:  [94 %-96 %] 96 %  BP:  (114-153)/(72-85) 136/74     Weight: 68.6 kg (151 lb 3.8 oz)  Body mass index is 27.66 kg/m².    Physical Exam  Vitals reviewed.   Constitutional:       Appearance: She is ill-appearing and toxic-appearing.   HENT:      Head: Normocephalic and atraumatic.   Cardiovascular:      Rate and Rhythm: Regular rhythm. Tachycardia present.      Pulses: Normal pulses.      Heart sounds: Murmur heard.   Pulmonary:      Effort: Pulmonary effort is normal.      Breath sounds: Normal breath sounds.   Abdominal:      General: Abdomen is flat.      Palpations: Abdomen is soft.   Musculoskeletal:         General: Tenderness present.   Skin:     General: Skin is warm and dry.      Capillary Refill: Capillary refill takes less than 2 seconds.   Neurological:      General: No focal deficit present.      Mental Status: She is disoriented.      Comments: Extremely drowsy, almost obtunded.            Significant Labs: All pertinent labs within the past 24 hours have been reviewed.    Significant Imaging: I have reviewed all pertinent imaging results/findings within the past 24 hours.

## 2023-02-06 NOTE — PROGRESS NOTES
Pharmacist Renal Dose Adjustment Note    Kymberly Franco is a 89 y.o. female being treated with the medication cefepime    Patient Data:    Vital Signs (Most Recent):    Vital Signs (72h Range):  Temp:  [98.1 °F (36.7 °C)-101.1 °F (38.4 °C)]   Pulse:  []   Resp:  [16-30]   BP: ()/(54-95)   SpO2:  [92 %-97 %]      Recent Labs   Lab 02/04/23  0251 02/05/23  0410 02/06/23  0427   CREATININE 1.07* 1.11* 1.04*     Creatinine clearance cannot be calculated (Unknown ideal weight.)    Medication:cefepime dose: 1 gram frequency q8h will be changed to medication:cefepime dose:1 gram frequency:q12h    Pharmacist's Name: Annamarie Berrios  Pharmacist's Extension: [phone]

## 2023-02-07 NOTE — H&P
[facility] Medicine  History & Physical    Patient Name: Kymberly Franco  MRN: [id number]  Patient Class: IP- Inpatient  Admission Date: 2/6/2023  Attending Physician: Arlen Parry MD   Primary Care Provider: Andry Galarza MD         Patient information was obtained from caregiver / friend.     Subjective:     Principal Problem:CVA (cerebral vascular accident)    Chief Complaint: No chief complaint on file.       HPI: Pt is a 89 y/o f w/ recent fall resulting in L hip fracture, pt underwent surgical repair w/ orthopedic surgeon, post operatively pt did not waken up quite much. Had multiple episodes of hypotensive events post surgery. Initially it was though to be 2/2 over sedation vs pain medications, however MRI done revealed multiple infarcts, spoke to neurologist at [facility], who reviewed imaging and thought the infarcts more than likely represent d/t watershed areas being hypoperfused, which correlated w/ the timing post operatively when her bp was running very low. Per neurologist these types of infacrts occure d/t somone having a cardiac arrest or very low perfusing pressure. Neurologist just recommended supportive care. D/w family in detail, they understand she has a very poor prognosis and intend to do hospice/comfort measures ultimately until the patietns son arrives. Pt now having sepsis, likely d/t aspirating events. Pt will now be a/w sepsis to [facility] for further care.       Past Medical History:   Diagnosis Date    Alzheimer's dementia with anxiety     Anticoagulant long-term use     GERD (gastroesophageal reflux disease)     Hypertension     Mixed hyperlipidemia     Paroxysmal atrial fibrillation     Thyroid disease        Past Surgical History:   Procedure Laterality Date    APPENDECTOMY      INTRAMEDULLARY RODDING OF FEMUR Left 01/27/2023    Dr. Andry Cheung    INTRAMEDULLARY RODDING OF TROCHANTER OF FEMUR Left 1/27/2023    Procedure:  INSERTION, INTRAMEDULLARY THANG, FEMUR, TROCHANTER;  Surgeon: Andry Cheung MD;  Location: [facility];  Service: Orthopedics;  Laterality: Left;       Review of patient's allergies indicates:   Allergen Reactions    Cipro [ciprofloxacin hcl]     Pcn [penicillins] Hives and Itching       Current Facility-Administered Medications on File Prior to Encounter   Medication    [DISCONTINUED] acetaminophen tablet 1,000 mg    [DISCONTINUED] apixaban tablet 2.5 mg    [DISCONTINUED] bisacodyL EC tablet 10 mg    [DISCONTINUED] bisacodyL suppository 10 mg    [DISCONTINUED] ceFEPIme (MAXIPIME) 1 g in dextrose 5 % in water (D5W) 5 % 50 mL IVPB (MB+)    [DISCONTINUED] dextromethorphan-guaiFENesin  mg/5 ml liquid 10 mL    [DISCONTINUED] donepeziL tablet 10 mg    [DISCONTINUED] ibuprofen tablet 400 mg    [DISCONTINUED] labetaloL injection 20 mg    [DISCONTINUED] levothyroxine tablet 112 mcg    [DISCONTINUED] memantine tablet 10 mg    [DISCONTINUED] metoprolol injection 5 mg    [DISCONTINUED] ondansetron injection 4 mg    [DISCONTINUED] ondansetron injection 4 mg    [DISCONTINUED] ondansetron injection 8 mg    [DISCONTINUED] oxyCODONE immediate release tablet 5 mg    [DISCONTINUED] pantoprazole injection 40 mg    [DISCONTINUED] sertraline tablet 50 mg    [DISCONTINUED] simethicone chewable tablet 80 mg    [DISCONTINUED] sodium chloride 0.9% bolus 500 mL 500 mL     Current Outpatient Medications on File Prior to Encounter   Medication Sig    diltiaZEM (CARDIZEM CD) 180 MG 24 hr capsule Take 1 capsule (180 mg total) by mouth once daily. Hold pending med admission to [facility]    donepeziL (ARICEPT) 10 MG tablet Take 1 tablet (10 mg total) by mouth every evening.    ELIQUIS 2.5 mg Tab Take 1 tablet (2.5 mg total) by mouth 2 (two) times daily.    ezetimibe-simvastatin 10-20 mg (VYTORIN) 10-20 mg per tablet Hold pending med admission to [facility]    TAKE 1 TABLET BY MOUTH DAILY ..DO NOT EAT GRAPEFRUIT OR DRINK  GRAPEFRUIT JUICE WHILE TAKING..    furosemide (LASIX) 40 MG tablet Take 1 tablet (40 mg total) by mouth once daily. Hold pending med admission to [facility]    isosorbide mononitrate (IMDUR) 60 MG 24 hr tablet Take 1 tablet (60 mg total) by mouth once daily. Hold pending med admission to [facility]    levothyroxine (SYNTHROID) 112 MCG tablet Take 1 tablet (112 mcg total) by mouth before breakfast.    memantine (NAMENDA XR) 28 mg CSpX Take 1 capsule (28 mg total) by mouth once daily.    NAMZARIC 28-10 mg CSpX Take 1 capsule by mouth every evening. Hold pending med admission to [facility]    nebivoloL (BYSTOLIC) 5 MG Tab Take 1 tablet (5 mg total) by mouth every morning. Hold pending med admission to [facility]    olmesartan (BENICAR) 20 MG tablet Take 1 tablet (20 mg total) by mouth once daily. Hold pending med admission to [facility]    pantoprazole (PROTONIX) 40 MG tablet Hold pending med admission to [facility]  TAKE 1 TABLET BY MOUTH DAILY 30 MINUTES BEFORE A MEAL    sertraline (ZOLOFT) 50 MG tablet Take 1 tablet (50 mg total) by mouth once daily.    [DISCONTINUED] azithromycin (Z-MONSERRAT) 250 MG tablet Take 1 tablet (250 mg total) by mouth once daily.    [DISCONTINUED] cephALEXin (KEFLEX) 500 MG capsule Take 1 capsule (500 mg total) by mouth every 12 (twelve) hours.    [DISCONTINUED] dextrose 5 % in water (D5W) 5 % PgBk 50 mL with ceFEPIme 1 gram SolR 1 g Inject 1 g into the vein every 8 (eight) hours.    [DISCONTINUED] diazePAM (VALIUM) 2 MG tablet TAKE 1 TABLET BY MOUTH EACH NIGHT AT BEDTIME ..MAY CAUSE DROWSINESS.. NO ALCOHOL    [DISCONTINUED] diltiaZEM (CARDIZEM CD) 180 MG 24 hr capsule Take 1 capsule (180 mg total) by mouth once daily.    [DISCONTINUED] ezetimibe-simvastatin 10-20 mg (VYTORIN) 10-20 mg per tablet TAKE 1 TABLET BY MOUTH DAILY ..DO NOT EAT GRAPEFRUIT OR DRINK GRAPEFRUIT JUICE WHILE TAKING..    [DISCONTINUED] furosemide (LASIX) 40 MG tablet Take 1 tablet (40 mg total) by mouth once daily.    [DISCONTINUED] isosorbide  mononitrate (IMDUR) 60 MG 24 hr tablet Take 1 tablet (60 mg total) by mouth once daily.    [DISCONTINUED] levothyroxine (SYNTHROID) 100 MCG tablet TAKE 1 TABLET BY MOUTH DAILY ON EMPTY STOMACH    [DISCONTINUED] NAMZARIC 28-10 mg CSpX Take 1 capsule by mouth every evening.    [DISCONTINUED] nebivoloL (BYSTOLIC) 5 MG Tab Take 1 tablet (5 mg total) by mouth every morning.    [DISCONTINUED] olmesartan (BENICAR) 20 MG tablet Take 1 tablet (20 mg total) by mouth once daily.    [DISCONTINUED] pantoprazole (PROTONIX) 40 mg injection Inject 40 mg into the vein once daily.    [DISCONTINUED] pantoprazole (PROTONIX) 40 MG tablet TAKE 1 TABLET BY MOUTH DAILY 30 MINUTES BEFORE A MEAL    [DISCONTINUED] potassium chloride SA (K-DUR,KLOR-CON M) 10 MEQ tablet Take 1 tablet (10 mEq total) by mouth once daily.     Family History       Problem Relation (Age of Onset)    No Known Problems Mother, Father          Tobacco Use    Smoking status: Never    Smokeless tobacco: Never   Substance and Sexual Activity    Alcohol use: Never    Drug use: Never    Sexual activity: Not Currently     Review of Systems   Unable to perform ROS: Acuity of condition   Objective:     Vital Signs (Most Recent):  Temp: 98.9 °F (37.2 °C) (02/06/23 1614)  Pulse: (!) 136 (02/06/23 1614)  Resp: (!) 34 (02/06/23 1614)  BP: 136/74 (02/06/23 1614)  SpO2: 96 % (02/06/23 1614)   Vital Signs (24h Range):  Temp:  [98.3 °F (36.8 °C)-98.9 °F (37.2 °C)] 98.9 °F (37.2 °C)  Pulse:  [119-140] 136  Resp:  [20-34] 34  SpO2:  [94 %-96 %] 96 %  BP: (114-153)/(72-85) 136/74     Weight: 68.6 kg (151 lb 3.8 oz)  Body mass index is 27.66 kg/m².    Physical Exam  Vitals reviewed.   Constitutional:       Appearance: She is ill-appearing and toxic-appearing.   HENT:      Head: Normocephalic and atraumatic.   Cardiovascular:      Rate and Rhythm: Regular rhythm. Tachycardia present.      Pulses: Normal pulses.      Heart sounds: Murmur heard.   Pulmonary:      Effort:  Pulmonary effort is normal.      Breath sounds: Normal breath sounds.   Abdominal:      General: Abdomen is flat.      Palpations: Abdomen is soft.   Musculoskeletal:         General: Tenderness present.   Skin:     General: Skin is warm and dry.      Capillary Refill: Capillary refill takes less than 2 seconds.   Neurological:      General: No focal deficit present.      Mental Status: She is disoriented.      Comments: Extremely drowsy, almost obtunded.            Significant Labs: All pertinent labs within the past 24 hours have been reviewed.    Significant Imaging: I have reviewed all pertinent imaging results/findings within the past 24 hours.    Assessment/Plan:     * CVA (cerebral vascular accident)  Likely d/t hypotensive episodes.  Cont supportive care  Family doesn't want peg tube  Once the son arrives plan is to pull ng tube out and transition to comfort care      Sepsis  This patient does have evidence of infective focus  My overall impression is sepsis.  Source: Respiratory and Skin and Soft Tissue (location back)  Antibiotics given-   Antibiotics (72h ago, onward)    Start     Stop Route Frequency Ordered    02/06/23 1800  ceFEPIme (MAXIPIME) 1 g in dextrose 5 % in water (D5W) 5 % 50 mL IVPB (MB+)         -- IV Every 12 hours (non-standard times) 02/06/23 1552    02/06/23 1045  mupirocin 2 % ointment         02/11 0859 Nasl 2 times daily 02/06/23 0942        Latest lactate reviewed-  No results for input(s): LACTATE in the last 72 hours.  Organ dysfunction indicated by Acute kidney injury    Fluid challenge Not needed - patient is not hypotensive      Post- resuscitation assessment No - Post resuscitation assessment not needed       Will Not start Pressors- Levophed for MAP of 65  Source control achieved by: iv cefepime        VTE Risk Mitigation (From admission, onward)         Ordered     apixaban tablet 2.5 mg  2 times daily         02/06/23 0941                   Rehmat KATLIN Parry MD  Department of  [facility] Medicine   [facility]

## 2023-02-07 NOTE — PLAN OF CARE
[facility]  Initial Discharge Assessment       Primary Care Provider: Andry Galarza MD    Admission Diagnosis: Hospice care [Z51.5]    Admission Date: 2/6/2023  Expected Discharge Date:     Discharge Barriers Identified: None    Payor: MEDICARE / Plan: MEDICARE PART A & B / Product Type: Government /     Extended Emergency Contact Information  Primary Emergency Contact: TUAN RAMOS  Address: [address] [place] [address] [id number] [place] [address]  Home Phone: [phone]  Mobile Phone: [phone]  Relation: Relative  Preferred language: English   needed? No    Discharge Plan A: Other  Discharge Plan B:  ([facility])      [facility] - [place], [place] - [address] [facility]  [address] [facility] [address]  Phone: [phone] Fax: [phone]    [id number] - [place], [place] - [address] [facility]  [address]  [place] [address]  Phone: [phone] Fax: [phone]    [facility], [place] - [address]  [address]  [facility] [address]  Phone: [phone] Fax: [phone]      Initial Assessment (most recent)       Adult Discharge Assessment - 02/07/23 1317          Discharge Assessment    Assessment Type Discharge Planning Assessment     Confirmed Demographics Correct on Facesheet     Source of Information family     If unable to respond/provide information was family/caregiver contacted? Yes     Contact Name/Number Stephanie Ramos- [phone]     Communicated CARLITOS with patient/caregiver Date not available/Unable to determine     People in Home child(chucky), adult     Facility Arrived From: [place]     Do you expect to return to your current living situation? No     Do you have help at home or someone to help you manage your care at home? Yes     Who are your caregiver(s) and their phone number(s)? Stephanie Ramos- daughter     Prior to  hospitilization cognitive status: Unable to Assess     Current cognitive status: Unable to Assess     Home Accessibility wheelchair accessible     Home Layout Able to live on 1st floor     Equipment Currently Used at Home wheelchair;shower chair;bedside commode     Patient currently being followed by outpatient case management? No     Do you currently have service(s) that help you manage your care at home? Yes     Do you take prescription medications? Yes     Do you have prescription coverage? Yes     Do you have any problems affording any of your prescribed medications? No     Is the patient taking medications as prescribed? yes     How do you get to doctors appointments? family or friend will provide     Are you on dialysis? No     Do you take coumadin? No     Discharge Plan A Other     Discharge Plan B --   hospital swb    DME Needed Upon Discharge  none     Discharge Plan discussed with: Adult children     Discharge Barriers Identified None                        SS spoke with pt's daughter. Pt was living at home with her daughter. Has needed equipment. Informed of IDT meeting and unsure if they will attend. IM obtained

## 2023-02-07 NOTE — ASSESSMENT & PLAN NOTE
Likely d/t hypotensive episodes.  Cont supportive care  Family doesn't want peg tube  Once the son arrives plan is to pull ng tube out and transition to comfort care

## 2023-02-07 NOTE — ASSESSMENT & PLAN NOTE
This patient does have evidence of infective focus  My overall impression is sepsis.  Source: Respiratory and Skin and Soft Tissue (location back)  Antibiotics given-   Antibiotics (72h ago, onward)    Start     Stop Route Frequency Ordered    02/06/23 1800  ceFEPIme (MAXIPIME) 1 g in dextrose 5 % in water (D5W) 5 % 50 mL IVPB (MB+)         -- IV Every 12 hours (non-standard times) 02/06/23 1552    02/06/23 1045  mupirocin 2 % ointment         02/11 0859 Nasl 2 times daily 02/06/23 0942        Latest lactate reviewed-  No results for input(s): LACTATE in the last 72 hours.  Organ dysfunction indicated by Acute kidney injury    Fluid challenge Not needed - patient is not hypotensive      Post- resuscitation assessment No - Post resuscitation assessment not needed       Will Not start Pressors- Levophed for MAP of 65  Source control achieved by: iv cefepime

## 2023-02-07 NOTE — PROGRESS NOTES
[facility] Medicine  Progress Note    Patient Name: Kymberly Franco  MRN: [id number]  Patient Class: IP- Inpatient   Admission Date: 2/6/2023  Length of Stay: 1 days  Attending Physician: Arlen Parry MD  Primary Care Provider: Andry Galarza MD        Subjective:     Principal Problem:CVA (cerebral vascular accident)        HPI:  Pt is a 89 y/o f w/ recent fall resulting in L hip fracture, pt underwent surgical repair w/ orthopedic surgeon, post operatively pt did not waken up quite much. Had multiple episodes of hypotensive events post surgery. Initially it was though to be 2/2 over sedation vs pain medications, however MRI done revealed multiple infarcts, spoke to neurologist at [facility], who reviewed imaging and thought the infarcts more than likely represent d/t watershed areas being hypoperfused, which correlated w/ the timing post operatively when her bp was running very low. Per neurologist these types of infacrts occure d/t somone having a cardiac arrest or very low perfusing pressure. Neurologist just recommended supportive care. D/w family in detail, they understand she has a very poor prognosis and intend to do hospice/comfort measures ultimately until the patietns son arrives. Pt now having sepsis, likely d/t aspirating events. Pt will now be a/w sepsis to [facility] for further care.       Overview/Hospital Course:  No notes on file    Interval History:     Long d/w family , daughter wants to pursue hospice , however not yet ready for comfort measures today, wants to talk to her borther. Cont Tfs today and iv abx.   Pt appears comfortable.     Review of Systems   Unable to perform ROS: Acuity of condition   Objective:     Vital Signs (Most Recent):  Temp: 97.4 °F (36.3 °C) (02/07/23 0800)  Pulse: (!) 117 (02/07/23 0800)  Resp: 20 (02/07/23 0800)  BP: 132/70 (02/07/23 0800)  SpO2: 97 % (02/07/23 0800)   Vital Signs (24h Range):  Temp:  [97.4 °F (36.3 °C)-98.9 °F  (37.2 °C)] 97.4 °F (36.3 °C)  Pulse:  [117-146] 117  Resp:  [20-34] 20  SpO2:  [94 %-98 %] 97 %  BP: (127-156)/(69-95) 132/70     Weight: 68.6 kg (151 lb 3.8 oz)  Body mass index is 27.66 kg/m².    Intake/Output Summary (Last 24 hours) at 2/7/2023 1138  Last data filed at 2/7/2023 0640  Gross per 24 hour   Intake 1359 ml   Output 2200 ml   Net -841 ml      Physical Exam  Vitals reviewed.   Constitutional:       Appearance: She is ill-appearing and toxic-appearing.   HENT:      Head: Normocephalic and atraumatic.   Cardiovascular:      Rate and Rhythm: Regular rhythm. Tachycardia present.      Pulses: Normal pulses.      Heart sounds: Murmur heard.   Pulmonary:      Effort: Pulmonary effort is normal.      Breath sounds: Normal breath sounds.   Abdominal:      General: Abdomen is flat.      Palpations: Abdomen is soft.   Musculoskeletal:         General: Tenderness present.   Skin:     General: Skin is warm and dry.      Capillary Refill: Capillary refill takes less than 2 seconds.   Neurological:      General: No focal deficit present.      Mental Status: She is disoriented.      Comments: Extremely drowsy, almost obtunded.        Significant Labs: All pertinent labs within the past 24 hours have been reviewed.    Significant Imaging: I have reviewed all pertinent imaging results/findings within the past 24 hours.      Assessment/Plan:      * CVA (cerebral vascular accident)  Likely d/t hypotensive episodes.  Cont supportive care  Family doesn't want peg tube  Once the son arrives plan is to pull ng tube out and transition to comfort care      Sepsis  This patient does have evidence of infective focus  My overall impression is sepsis.  Source: Respiratory and Skin and Soft Tissue (location back)  Antibiotics given-   Antibiotics (72h ago, onward)    Start     Stop Route Frequency Ordered    02/06/23 1800  ceFEPIme (MAXIPIME) 1 g in dextrose 5 % in water (D5W) 5 % 50 mL IVPB (MB+)         -- IV Every 12 hours  (non-standard times) 02/06/23 1552    02/06/23 1045  mupirocin 2 % ointment         02/11 0859 Nasl 2 times daily 02/06/23 0942        Latest lactate reviewed-  No results for input(s): LACTATE in the last 72 hours.  Organ dysfunction indicated by Acute kidney injury    Fluid challenge Not needed - patient is not hypotensive      Post- resuscitation assessment No - Post resuscitation assessment not needed       Will Not start Pressors- Levophed for MAP of 65  Source control achieved by: iv cefepime        VTE Risk Mitigation (From admission, onward)         Ordered     apixaban tablet 2.5 mg  2 times daily         02/06/23 0941                Discharge Planning   CARLITOS:      Code Status: DNR   Is the patient medically ready for discharge?:     Reason for patient still in hospital (select all that apply): Treatment                     Rehmat KATLIN Parry MD  Department of Hospital Medicine   [facility]

## 2023-02-07 NOTE — CONSULTS
"  [facility]  Adult Nutrition  Consult Note    SUMMARY     Recommendations    Recommendation/Intervention: Recommend resume tube feedings of Jevity 1.5 (Isosource may be used in place of Jevity due to supply isses) with goal rate @ 42 mL/hr + Free water flush @ 35 mL/hr ot provide: 1512 kcals, 64 g PRO, ~1534 mL fluid. Resume @ 10 mL/hr and increase by 10 mL q6-8 hours as tolerated until goal rate achieved. Free water flush may be per MD given pt with elevated sodium levels at this time. Keep HOB @ 30-45 degrees. Monitor for abd distention/pain, n/v/d/c, residuals >500 mL.  Goals: resume tube feeds, tolerate feeds, weight maintenance  Nutrition Goal Status: new    Assessment and Plan  Consult received and appreciated. RD following pt previously.     Per MD:   "89 y/o f w/ recent fall resulting in L hip fracture, pt underwent surgical repair w/ orthopedic surgeon, post operatively pt did not waken up quite much. Had multiple episodes of hypotensive events post surgery. Initially it was though to be 2/2 over sedation vs pain medications, however MRI done revealed multiple infarcts, spoke to neurologist at [facility], who reviewed imaging and thought the infarcts more than likely represent d/t watershed areas being hypoperfused, which correlated w/ the timing post operatively when her bp was running very low. Per neurologist these types of infacrts occure d/t somone having a cardiac arrest or very low perfusing pressure. Neurologist just recommended supportive care. D/w family in detail, they understand she has a very poor prognosis and intend to do hospice/comfort measures ultimately until the patietns son arrives. Pt now having sepsis, likely d/t aspirating events. Pt will now be a/w sepsis to [facility] for further care."     Current weight is 151 lbs. Pt currently NPO. NG tube placed. Per MD note 2/6, "Family doesn't want peg tube. Once the son arrives plan is to pull ng tube out and " "transition to comfort care." RD secure chat MD about placing tube feed order. MD would like RD to place order at this time. MD plans to speak with family about goals of care once they arrive. Should goals of care not include aggressive nutrition intervention, MD will d/c tube feeding orders.     Recommend resuming tube feeding of Jevity 1.5 (Isosource may be used in place of Jevity due to supply issues) with goal rate @ 42 mL/hr + free water flush @ 35 mL/hr to provide: 1512 kcals, 64 g PRO, ~1534 mL fluid. Free water flush may be per MD given pt with elevated sodium levels.     Resume @ 10 mL/hr and increase by 10 mL q6-8 hours as tolerated until goal rate achieved. Keep HOB @ 30-45 degrees. Monitor for abd distention/pain, n/v/d/c, residuals >500 mL.    RD to continue following and provide recommendations based on plan of care and pt goals of care.    Last BM 2/6 per flowsheets. Labs/meds reviewed. RD following.     Nutrition Diagnosis  Inadequate energy intake   related to Appetite loss as evidenced by requiring NG feedings to maintain nutritional status     Nutrition Diagnosis Status: Chronic/ continues    Reason for Assessment    Reason For Assessment: consult, RD follow-up    Nutrition Risk Screen    Nutrition Risk Screen: tube feeding or parenteral nutrition    Anthropometrics    Temp: 98 °F (36.7 °C)  Height: 5' 2" (157.5 cm)  Height (inches): 62 in  Weight Method: Bed Scale  Weight: 68.6 kg (151 lb 3.8 oz)  Weight (lb): 151.24 lb  Ideal Body Weight (IBW), Female: 110 lb  % Ideal Body Weight, Female (lb): 137.49 %  BMI (Calculated): 27.7       Lab/Procedures/Meds   Latest Reference Range & Units 02/07/23 06:05   Sodium 136 - 145 mmol/L 150 (H)   Potassium 3.5 - 5.1 mmol/L 5.0   Chloride 98 - 107 mmol/L 113 (H)   CO2 21 - 32 mmol/L 29   Anion Gap 7 - 16 mmol/L 13   BUN 7 - 18 mg/dL 41 (H)   Creatinine 0.55 - 1.02 mg/dL 0.97   BUN/CREAT RATIO 6 - 20  42 (H)   eGFR >=60 mL/min/1.73m² 53 (L)   Glucose 74 - 106 " mg/dL 155 (H)   Calcium 8.5 - 10.1 mg/dL 8.1 (L)   Phosphorus 2.5 - 4.5 mg/dL 4.1   Magnesium 1.7 - 2.3 mg/dL 2.7 (H)   Alkaline Phosphatase 55 - 142 U/L 86   PROTEIN TOTAL 6.4 - 8.2 g/dL 5.3 (L)   Albumin 3.5 - 5.0 g/dL 1.9 (L)   Albumin/Globulin Ratio  0.6   BILIRUBIN TOTAL >0.0 - 1.2 mg/dL 0.4   AST 15 - 37 U/L 55 (H)   ALT 13 - 56 U/L 41   (H): Data is abnormally high  (L): Data is abnormally low    Note: elevated BUN, Cr and low GFR - no PMH of CKD. Elevated glucose - no PMH of DM. Elevated Na, Mg. Low total protein, albumin. Elevated AST.     Pertinent Labs Reviewed: reviewed  Pertinent Medications Reviewed: reviewed  Pertinent Medications Comments: apixaban, maxipime, donepezil, famotidine, levothyroxine, memantine, pantoprazole, sertraline, sodium zirconium    Estimated/Assessed Needs  Kcal/kg Energy Calorie Requirements (kcal): 5676-7658  Protein Requirements: 52-63    Nutrition Prescription Ordered    Current Diet Order: N[place]te  Nutrition Order Comments: not adequate to meet nutritional needs    Evaluation of Received Nutrient/Fluid Intake       % Intake of Estimated Energy Needs: 0 - 25 %  % Meal Intake: NPO    Nutrition Risk    Level of Risk/Frequency of Follow-up: high       Monitor and Evaluation    Food and Nutrient Intake: energy intake, enteral nutrition intake  Food and Nutrient Adminstration: enteral and parenteral nutrition administration  Knowledge/Beliefs/Attitudes: beliefs and attitudes, food and nutrition knowledge/skill  Anthropometric Measurements: weight, weight change, body mass index  Biochemical Data, Medical Tests and Procedures: glucose/endocrine profile, gastrointestinal profile, electrolyte and renal panel, inflammatory profile, lipid profile       Nutrition Follow-Up    RD Follow-up?: Yes

## 2023-02-07 NOTE — PLAN OF CARE
Problem: Adult Inpatient Plan of Care  Goal: Plan of Care Review  Outcome: Ongoing, Progressing  Goal: Patient-Specific Goal (Individualized)  Outcome: Ongoing, Progressing  Goal: Absence of Hospital-Acquired Illness or Injury  Outcome: Ongoing, Progressing  Goal: Optimal Comfort and Wellbeing  Outcome: Ongoing, Progressing  Goal: Readiness for Transition of Care  Outcome: Ongoing, Progressing     Problem: Fall Injury Risk  Goal: Absence of Fall and Fall-Related Injury  Outcome: Ongoing, Progressing     Problem: Impaired Wound Healing  Goal: Optimal Wound Healing  Outcome: Ongoing, Progressing     Problem: Skin Injury Risk Increased  Goal: Skin Health and Integrity  Outcome: Ongoing, Progressing     Problem: Adjustment to Illness (Sepsis/Septic Shock)  Goal: Optimal Coping  Outcome: Ongoing, Progressing     Problem: Bleeding (Sepsis/Septic Shock)  Goal: Absence of Bleeding  Outcome: Ongoing, Progressing     Problem: Glycemic Control Impaired (Sepsis/Septic Shock)  Goal: Blood Glucose Level Within Desired Range  Outcome: Ongoing, Progressing     Problem: Infection Progression (Sepsis/Septic Shock)  Goal: Absence of Infection Signs and Symptoms  Outcome: Ongoing, Progressing     Problem: Nutrition Impaired (Sepsis/Septic Shock)  Goal: Optimal Nutrition Intake  Outcome: Ongoing, Progressing     Problem: Infection  Goal: Absence of Infection Signs and Symptoms  Outcome: Ongoing, Progressing  Intervention: Prevent or Manage Infection  Flowsheets (Taken 2/7/2023 8820)  Fever Reduction/Comfort Measures:   lightweight bedding   lightweight clothing  Infection Management: aseptic technique maintained

## 2023-02-08 PROBLEM — Z51.5 COMFORT MEASURES ONLY STATUS: Status: ACTIVE | Noted: 2023-01-01

## 2023-02-08 PROBLEM — A41.9 SEPSIS: Status: RESOLVED | Noted: 2023-01-01 | Resolved: 2023-01-01

## 2023-02-08 NOTE — NURSING
Wound care note: 89yo female admitted to [facility] with diagnosis of CVA. Skin assessment performed. SDTI noted to upper back and stage 1 pressure noted to right heel. Staples intact x3 sites r/t hip surgery; foam dressing applied.

## 2023-02-08 NOTE — ASSESSMENT & PLAN NOTE
Likely d/t hypotensive episodes.  Cont supportive care  Family doesn't want peg tube  Pt now with cmfort measures only.

## 2023-02-08 NOTE — SUBJECTIVE & OBJECTIVE
Interval History:     Transitoined to comfort measures only per family decision today, comfort orders placed, d/c oral/ngt meds, no Tfs , removed NGT orders given to RN. Pt appears to be calm and comfortable, emotional support offered to family.     Review of Systems   Unable to perform ROS: Acuity of condition   Objective:     Vital Signs (Most Recent):  Temp: 97.2 °F (36.2 °C) (02/08/23 0815)  Pulse: 92 (02/08/23 0815)  Resp: (!) 24 (02/08/23 0815)  BP: (!) 140/73 (02/08/23 0400)  SpO2: 100 % (02/08/23 0815)   Vital Signs (24h Range):  Temp:  [97.2 °F (36.2 °C)-98.4 °F (36.9 °C)] 97.2 °F (36.2 °C)  Pulse:  [] 92  Resp:  [16-24] 24  SpO2:  [96 %-100 %] 100 %  BP: (117-140)/(64-77) 140/73     Weight: 68.6 kg (151 lb 3.8 oz)  Body mass index is 27.66 kg/m².    Intake/Output Summary (Last 24 hours) at 2/8/2023 1149  Last data filed at 2/8/2023 0557  Gross per 24 hour   Intake 6546 ml   Output 425 ml   Net 6121 ml        Physical Exam  Vitals reviewed.   Constitutional:       Appearance: She is ill-appearing and toxic-appearing.   HENT:      Head: Normocephalic and atraumatic.   Cardiovascular:      Rate and Rhythm: Regular rhythm. Tachycardia present.      Pulses: Normal pulses.      Heart sounds: Murmur heard.   Pulmonary:      Effort: Pulmonary effort is normal.      Breath sounds: Normal breath sounds.   Abdominal:      General: Abdomen is flat.      Palpations: Abdomen is soft.   Musculoskeletal:         General: Tenderness present.   Skin:     General: Skin is warm and dry.      Capillary Refill: Capillary refill takes less than 2 seconds.   Neurological:      General: No focal deficit present.      Mental Status: She is disoriented.      Comments: Extremely drowsy, almost obtunded.        Significant Labs: All pertinent labs within the past 24 hours have been reviewed.    Significant Imaging: I have reviewed all pertinent imaging results/findings within the past 24 hours.

## 2023-02-08 NOTE — PLAN OF CARE
Problem: Adult Inpatient Plan of Care  Goal: Plan of Care Review  Outcome: Ongoing, Progressing  Goal: Patient-Specific Goal (Individualized)  Outcome: Ongoing, Progressing  Goal: Absence of Hospital-Acquired Illness or Injury  Outcome: Ongoing, Progressing  Goal: Optimal Comfort and Wellbeing  Outcome: Ongoing, Progressing  Goal: Readiness for Transition of Care  Outcome: Ongoing, Progressing     Problem: Fall Injury Risk  Goal: Absence of Fall and Fall-Related Injury  Outcome: Ongoing, Progressing     Problem: Impaired Wound Healing  Goal: Optimal Wound Healing  Outcome: Ongoing, Progressing     Problem: Skin Injury Risk Increased  Goal: Skin Health and Integrity  Outcome: Ongoing, Progressing     Problem: Adjustment to Illness (Sepsis/Septic Shock)  Goal: Optimal Coping  Outcome: Ongoing, Progressing     Problem: Bleeding (Sepsis/Septic Shock)  Goal: Absence of Bleeding  Outcome: Ongoing, Progressing     Problem: Glycemic Control Impaired (Sepsis/Septic Shock)  Goal: Blood Glucose Level Within Desired Range  Outcome: Ongoing, Progressing     Problem: Infection Progression (Sepsis/Septic Shock)  Goal: Absence of Infection Signs and Symptoms  Outcome: Ongoing, Progressing     Problem: Nutrition Impaired (Sepsis/Septic Shock)  Goal: Optimal Nutrition Intake  Outcome: Ongoing, Progressing     Problem: Infection  Goal: Absence of Infection Signs and Symptoms  Outcome: Ongoing, Progressing  Intervention: Prevent or Manage Infection  Flowsheets (Taken 2/7/2023 8038)  Fever Reduction/Comfort Measures:   lightweight bedding   lightweight clothing  Infection Management: aseptic technique maintained  Dykes care performed

## 2023-02-08 NOTE — PLAN OF CARE
Problem: Adult Inpatient Plan of Care  Goal: Plan of Care Review  Outcome: Ongoing, Progressing  Goal: Patient-Specific Goal (Individualized)  Outcome: Ongoing, Progressing  Goal: Absence of Hospital-Acquired Illness or Injury  Outcome: Ongoing, Progressing  Goal: Optimal Comfort and Wellbeing  Outcome: Ongoing, Progressing  Goal: Readiness for Transition of Care  Outcome: Ongoing, Progressing     Problem: Fall Injury Risk  Goal: Absence of Fall and Fall-Related Injury  Outcome: Ongoing, Progressing     Problem: Impaired Wound Healing  Goal: Optimal Wound Healing  Outcome: Ongoing, Progressing     Problem: Skin Injury Risk Increased  Goal: Skin Health and Integrity  Outcome: Ongoing, Progressing     Problem: Adjustment to Illness (Sepsis/Septic Shock)  Goal: Optimal Coping  Outcome: Ongoing, Progressing     Problem: Bleeding (Sepsis/Septic Shock)  Goal: Absence of Bleeding  Outcome: Ongoing, Progressing     Problem: Glycemic Control Impaired (Sepsis/Septic Shock)  Goal: Blood Glucose Level Within Desired Range  Outcome: Ongoing, Progressing     Problem: Infection Progression (Sepsis/Septic Shock)  Goal: Absence of Infection Signs and Symptoms  Outcome: Ongoing, Progressing     Problem: Nutrition Impaired (Sepsis/Septic Shock)  Goal: Optimal Nutrition Intake  Outcome: Ongoing, Progressing     Problem: Infection  Goal: Absence of Infection Signs and Symptoms  Outcome: Ongoing, Progressing

## 2023-02-08 NOTE — PROGRESS NOTES
Pharmacist Renal Dose Adjustment Note    Kymberly Franco is a 89 y.o. female being treated with the medication cefepime    Patient Data:    Vital Signs (Most Recent):  Temp: 97.2 °F (36.2 °C) (02/08/23 0815)  Pulse: 92 (02/08/23 0815)  Resp: (!) 24 (02/08/23 0815)  BP: (!) 140/73 (02/08/23 0400)  SpO2: 100 % (02/08/23 0815)   Vital Signs (72h Range):  Temp:  [97.2 °F (36.2 °C)-98.9 °F (37.2 °C)]   Pulse:  []   Resp:  [16-34]   BP: (114-156)/(59-95)   SpO2:  [94 %-100 %]      Recent Labs   Lab 02/06/23  0427 02/07/23  0605 02/08/23  0557   CREATININE 1.04* 0.97 0.93     Serum creatinine: 0.93 mg/dL 02/08/23 0557  Estimated creatinine clearance: 31.4 mL/min    Medication:cefepime dose: 1 gram frequency q12h will be changed to medication:cefepime dose:1 gram frequency:q8h    Pharmacist's Name: Annamarie Berrios  Pharmacist's Extension: [phone]

## 2023-02-08 NOTE — PROGRESS NOTES
[facility] Medicine  Progress Note    Patient Name: Kymberly Franco  MRN: [id number]  Patient Class: IP- Inpatient   Admission Date: 2/6/2023  Length of Stay: 2 days  Attending Physician: Arlen Parry MD  Primary Care Provider: Andry Galarza MD        Subjective:     Principal Problem:CVA (cerebral vascular accident)        HPI:  Pt is a 89 y/o f w/ recent fall resulting in L hip fracture, pt underwent surgical repair w/ orthopedic surgeon, post operatively pt did not waken up quite much. Had multiple episodes of hypotensive events post surgery. Initially it was though to be 2/2 over sedation vs pain medications, however MRI done revealed multiple infarcts, spoke to neurologist at [facility], who reviewed imaging and thought the infarcts more than likely represent d/t watershed areas being hypoperfused, which correlated w/ the timing post operatively when her bp was running very low. Per neurologist these types of infacrts occure d/t somone having a cardiac arrest or very low perfusing pressure. Neurologist just recommended supportive care. D/w family in detail, they understand she has a very poor prognosis and intend to do hospice/comfort measures ultimately until the patietns son arrives. Pt now having sepsis, likely d/t aspirating events. Pt will now be a/w sepsis to [facility] for further care.       Overview/Hospital Course:  No notes on file    Interval History:     Transitoined to comfort measures only per family decision today, comfort orders placed, d/c oral/ngt meds, no Tfs , removed NGT orders given to RN. Pt appears to be calm and comfortable, emotional support offered to family.     Review of Systems   Unable to perform ROS: Acuity of condition   Objective:     Vital Signs (Most Recent):  Temp: 97.2 °F (36.2 °C) (02/08/23 0815)  Pulse: 92 (02/08/23 0815)  Resp: (!) 24 (02/08/23 0815)  BP: (!) 140/73 (02/08/23 0400)  SpO2: 100 % (02/08/23 0815)   Vital Signs (24h  Range):  Temp:  [97.2 °F (36.2 °C)-98.4 °F (36.9 °C)] 97.2 °F (36.2 °C)  Pulse:  [] 92  Resp:  [16-24] 24  SpO2:  [96 %-100 %] 100 %  BP: (117-140)/(64-77) 140/73     Weight: 68.6 kg (151 lb 3.8 oz)  Body mass index is 27.66 kg/m².    Intake/Output Summary (Last 24 hours) at 2/8/2023 1149  Last data filed at 2/8/2023 0557  Gross per 24 hour   Intake 6546 ml   Output 425 ml   Net 6121 ml        Physical Exam  Vitals reviewed.   Constitutional:       Appearance: She is ill-appearing and toxic-appearing.   HENT:      Head: Normocephalic and atraumatic.   Cardiovascular:      Rate and Rhythm: Regular rhythm. Tachycardia present.      Pulses: Normal pulses.      Heart sounds: Murmur heard.   Pulmonary:      Effort: Pulmonary effort is normal.      Breath sounds: Normal breath sounds.   Abdominal:      General: Abdomen is flat.      Palpations: Abdomen is soft.   Musculoskeletal:         General: Tenderness present.   Skin:     General: Skin is warm and dry.      Capillary Refill: Capillary refill takes less than 2 seconds.   Neurological:      General: No focal deficit present.      Mental Status: She is disoriented.      Comments: Extremely drowsy, almost obtunded.        Significant Labs: All pertinent labs within the past 24 hours have been reviewed.    Significant Imaging: I have reviewed all pertinent imaging results/findings within the past 24 hours.      Assessment/Plan:      * CVA (cerebral vascular accident)  Likely d/t hypotensive episodes.  Cont supportive care  Family doesn't want peg tube  Pt now with cmfort measures only.       Comfort measures only status    Continue comfort measures.         VTE Risk Mitigation (From admission, onward)    None          Discharge Planning   CARLITOS:      Code Status: DNR   Is the patient medically ready for discharge?:     Reason for patient still in hospital (select all that apply): Treatment  Discharge Plan A: Other                  Rehmat KATLIN Parry MD  Department of  [facility] Medicine   [facility]

## 2023-02-09 NOTE — PROGRESS NOTES
[facility] Medicine  Progress Note    Patient Name: Kymberly Franco  MRN: [id number]  Patient Class: IP- Inpatient   Admission Date: 2/6/2023  Length of Stay: 3 days  Attending Physician: Arlen Parry MD  Primary Care Provider: Andry Galarza MD        Subjective:     Principal Problem:CVA (cerebral vascular accident)        HPI:  Pt is a 89 y/o f w/ recent fall resulting in L hip fracture, pt underwent surgical repair w/ orthopedic surgeon, post operatively pt did not waken up quite much. Had multiple episodes of hypotensive events post surgery. Initially it was though to be 2/2 over sedation vs pain medications, however MRI done revealed multiple infarcts, spoke to neurologist at [facility], who reviewed imaging and thought the infarcts more than likely represent d/t watershed areas being hypoperfused, which correlated w/ the timing post operatively when her bp was running very low. Per neurologist these types of infacrts occure d/t somone having a cardiac arrest or very low perfusing pressure. Neurologist just recommended supportive care. D/w family in detail, they understand she has a very poor prognosis and intend to do hospice/comfort measures ultimately until the patietns son arrives. Pt now having sepsis, likely d/t aspirating events. Pt will now be a/w sepsis to [facility] for further care.       Overview/Hospital Course:  No notes on file    Interval History:     Transitioned to comfort measures yestrday  Appears calm and comfortable.     Review of Systems   Unable to perform ROS: Acuity of condition   Objective:     Vital Signs (Most Recent):  Temp: 97.2 °F (36.2 °C) (02/08/23 0815)  Pulse: 109 (02/09/23 0750)  Resp: 20 (02/09/23 0750)  BP: (!) 140/73 (02/08/23 0400)  SpO2: 100 % (02/08/23 0815)   Vital Signs (24h Range):  Pulse:  [109] 109  Resp:  [20] 20     Weight: 68.6 kg (151 lb 3.8 oz)  Body mass index is 27.66 kg/m².    Intake/Output Summary (Last 24 hours)  at 2/9/2023 1028  Last data filed at 2/9/2023 0609  Gross per 24 hour   Intake 330 ml   Output 1125 ml   Net -795 ml        Physical Exam  Vitals reviewed.   Constitutional:       Appearance: She is ill-appearing and toxic-appearing.   HENT:      Head: Normocephalic and atraumatic.   Cardiovascular:      Rate and Rhythm: Regular rhythm. Tachycardia present.      Pulses: Normal pulses.      Heart sounds: Murmur heard.   Pulmonary:      Effort: Pulmonary effort is normal.      Breath sounds: Normal breath sounds.   Abdominal:      General: Abdomen is flat.      Palpations: Abdomen is soft.   Musculoskeletal:         General: Tenderness present.   Skin:     General: Skin is warm and dry.      Capillary Refill: Capillary refill takes less than 2 seconds.   Neurological:      General: No focal deficit present.      Mental Status: She is disoriented.      Comments: Extremely drowsy, almost obtunded.        Significant Labs: All pertinent labs within the past 24 hours have been reviewed.    Significant Imaging: I have reviewed all pertinent imaging results/findings within the past 24 hours.      Assessment/Plan:      * CVA (cerebral vascular accident)  Likely d/t hypotensive episodes.  Cont supportive care  Family doesn't want peg tube  Pt now with cmfort measures only.       Comfort measures only status    Continue comfort measures.         VTE Risk Mitigation (From admission, onward)    None          Discharge Planning   CARLITOS:      Code Status: DNR   Is the patient medically ready for discharge?:     Reason for patient still in hospital (select all that apply): Treatment  Discharge Plan A: Other                  Rehmat U MD Brinda  Department of Hospital Medicine   [facility]

## 2023-02-09 NOTE — PLAN OF CARE
Problem: Skin Injury Risk Increased  Goal: Skin Health and Integrity  Outcome: Ongoing, Progressing     Problem: Skin Injury Risk Increased  Goal: Skin Health and Integrity  Outcome: Ongoing, Progressing

## 2023-02-09 NOTE — PLAN OF CARE
Problem: Adult Inpatient Plan of Care  Goal: Plan of Care Review  Outcome: Ongoing, Progressing  Goal: Patient-Specific Goal (Individualized)  Outcome: Ongoing, Progressing  Goal: Absence of Hospital-Acquired Illness or Injury  Outcome: Ongoing, Progressing  Goal: Optimal Comfort and Wellbeing  Outcome: Ongoing, Progressing     Problem: Adult Inpatient Plan of Care  Goal: Plan of Care Review  Outcome: Ongoing, Progressing  Goal: Patient-Specific Goal (Individualized)  Outcome: Ongoing, Progressing  Goal: Absence of Hospital-Acquired Illness or Injury  Outcome: Ongoing, Progressing  Goal: Optimal Comfort and Wellbeing  Outcome: Ongoing, Progressing

## 2023-02-09 NOTE — PROGRESS NOTES
"[facility]  Adult Nutrition  Progress Note    SUMMARY       Recommendations    Recommendation/Intervention: Pt now on comfort care measures. Will continue to follow pt and provided appropriate recommendations that coincide with plan of care and goals of care.  Goals: weight maintenance  Nutrition Goal Status: progressing towards goal    Assessment and Plan  RD follow up. Current weight is 151 lbs. Tube feedings have been stopped.    Per MD:   "Transitoined to comfort measures only per family decision today, comfort orders placed, d/c oral/ngt meds, no Tfs , removed NGT orders given to RN. Pt appears to be calm and comfortable, emotional support offered to family"    Last BM 2/6 per flowsheets. Labs/meds reviewed. RD following. Will continue to follow and provide recommendations that coincide with plan of care and goals of care. If more aggressive nutrition intervention desired, please consult for recommendations.     Reason for Assessment    Reason For Assessment: RD follow-up    Nutrition Risk Screen    Nutrition Risk Screen: tube feeding or parenteral nutrition    Anthropometrics    Temp: 97.2 °F (36.2 °C)  Height: 5' 2" (157.5 cm)  Height (inches): 62 in  Weight Method: Bed Scale  Weight: 68.6 kg (151 lb 3.8 oz)  Weight (lb): 151.24 lb  Ideal Body Weight (IBW), Female: 110 lb  % Ideal Body Weight, Female (lb): 137.49 %  BMI (Calculated): 27.7       Lab/Procedures/Meds   Latest Reference Range & Units 02/08/23 05:57   Sodium 136 - 145 mmol/L 146 (H)   Potassium 3.5 - 5.1 mmol/L 4.8   Chloride 98 - 107 mmol/L 110 (H)   CO2 21 - 32 mmol/L 31   Anion Gap 7 - 16 mmol/L 10   BUN 7 - 18 mg/dL 39 (H)   Creatinine 0.55 - 1.02 mg/dL 0.93   BUN/CREAT RATIO 6 - 20  42 (H)   eGFR >=60 mL/min/1.73m² 56 (L)   Glucose 74 - 106 mg/dL 131 (H)   Calcium 8.5 - 10.1 mg/dL 7.8 (L)   Phosphorus 2.5 - 4.5 mg/dL 3.8   Magnesium 1.7 - 2.3 mg/dL 2.8 (H)   Alkaline Phosphatase 55 - 142 U/L 90   PROTEIN TOTAL 6.4 " - 8.2 g/dL 5.3 (L)   Albumin 3.5 - 5.0 g/dL 1.9 (L)   Albumin/Globulin Ratio  0.6   BILIRUBIN TOTAL >0.0 - 1.2 mg/dL 0.4   AST 15 - 37 U/L 49 (H)   ALT 13 - 56 U/L 41   Globulin, Total 2.0 - 4.0 g/dL 3.4   (H): Data is abnormally high  (L): Data is abnormally low    Note: Elevated Na. Elevated BUN, low GFR. Elevated glucose, Mg. Low total protein, albumin. Elevated AST.     Pertinent Labs Reviewed: reviewed  Pertinent Medications Reviewed: reviewed  Pertinent Medications Comments: scopalamine, mupirocin, miconazole    Nutrition Prescription Ordered    Current Diet Order: NPO  Nutrition Order Comments: not adequate to meet nutritional needs    Evaluation of Received Nutrient/Fluid Intake       % Intake of Estimated Energy Needs: 0 - 25 %  % Meal Intake: NPO    Nutrition Risk    Level of Risk/Frequency of Follow-up: moderate     Monitor and Evaluation    Food and Nutrient Intake: energy intake, enteral nutrition intake  Food and Nutrient Adminstration: enteral and parenteral nutrition administration  Knowledge/Beliefs/Attitudes: beliefs and attitudes, food and nutrition knowledge/skill  Anthropometric Measurements: weight, weight change, body mass index  Biochemical Data, Medical Tests and Procedures: glucose/endocrine profile, gastrointestinal profile, electrolyte and renal panel, inflammatory profile, lipid profile     Nutrition Follow-Up    RD Follow-up?: Yes

## 2023-02-09 NOTE — PLAN OF CARE
Problem: Skin Injury Risk Increased  Goal: Skin Health and Integrity  Outcome: Ongoing, Progressing  Intervention: Optimize Skin Protection  Flowsheets (Taken 2/8/2023 1952)  Pressure Reduction Techniques: heels elevated off bed  Pressure Reduction Devices: pressure-redistributing mattress utilized  Skin Protection:   tubing/devices free from skin contact   incontinence pads utilized  Head of Bed (HOB) Positioning: HOB at 30-45 degrees  Intervention: Promote and Optimize Oral Intake  Flowsheets (Taken 2/8/2023 1952)  Oral Nutrition Promotion:   safe use of adaptive equipment encouraged   rest periods promoted

## 2023-02-09 NOTE — SUBJECTIVE & OBJECTIVE
Interval History:     Transitioned to comfort measures yestrday  Appears calm and comfortable.     Review of Systems   Unable to perform ROS: Acuity of condition   Objective:     Vital Signs (Most Recent):  Temp: 97.2 °F (36.2 °C) (02/08/23 0815)  Pulse: 109 (02/09/23 0750)  Resp: 20 (02/09/23 0750)  BP: (!) 140/73 (02/08/23 0400)  SpO2: 100 % (02/08/23 0815)   Vital Signs (24h Range):  Pulse:  [109] 109  Resp:  [20] 20     Weight: 68.6 kg (151 lb 3.8 oz)  Body mass index is 27.66 kg/m².    Intake/Output Summary (Last 24 hours) at 2/9/2023 1028  Last data filed at 2/9/2023 0609  Gross per 24 hour   Intake 330 ml   Output 1125 ml   Net -795 ml        Physical Exam  Vitals reviewed.   Constitutional:       Appearance: She is ill-appearing and toxic-appearing.   HENT:      Head: Normocephalic and atraumatic.   Cardiovascular:      Rate and Rhythm: Regular rhythm. Tachycardia present.      Pulses: Normal pulses.      Heart sounds: Murmur heard.   Pulmonary:      Effort: Pulmonary effort is normal.      Breath sounds: Normal breath sounds.   Abdominal:      General: Abdomen is flat.      Palpations: Abdomen is soft.   Musculoskeletal:         General: Tenderness present.   Skin:     General: Skin is warm and dry.      Capillary Refill: Capillary refill takes less than 2 seconds.   Neurological:      General: No focal deficit present.      Mental Status: She is disoriented.      Comments: Extremely drowsy, almost obtunded.        Significant Labs: All pertinent labs within the past 24 hours have been reviewed.    Significant Imaging: I have reviewed all pertinent imaging results/findings within the past 24 hours.

## 2023-02-10 NOTE — PROGRESS NOTES
[facility] Medicine  Progress Note    Patient Name: Kymberly Franco  MRN: [id number]  Patient Class: IP- Inpatient   Admission Date: 2/6/2023  Length of Stay: 4 days  Attending Physician: Elvin Nguyễn MD  Primary Care Provider: Andry Galarza MD        Subjective:     Principal Problem:CVA (cerebral vascular accident)        HPI:  Pt is a 89 y/o f w/ recent fall resulting in L hip fracture, pt underwent surgical repair w/ orthopedic surgeon, post operatively pt did not waken up quite much. Had multiple episodes of hypotensive events post surgery. Initially it was though to be 2/2 over sedation vs pain medications, however MRI done revealed multiple infarcts, spoke to neurologist at [facility], who reviewed imaging and thought the infarcts more than likely represent d/t watershed areas being hypoperfused, which correlated w/ the timing post operatively when her bp was running very low. Per neurologist these types of infacrts occure d/t somone having a cardiac arrest or very low perfusing pressure. Neurologist just recommended supportive care. D/w family in detail, they understand she has a very poor prognosis and intend to do hospice/comfort measures ultimately until the patietns son arrives. Pt now having sepsis, likely d/t aspirating events. Pt will now be a/w sepsis to [facility] for further care.       Overview/Hospital Course:  No notes on file    No new subjective & objective note has been filed under this hospital service since the last note was generated.    02/10/2023   Patient resting comfortably in no acute distress family is at the bedside today.  Family is asking for comfort care measures lungs clear with poor effort cardiovascular S1-S2 regular rate rhythm abdomen is soft positive bowel sounds extremities slightly edematous  Comfort care measures.  Assessment/Plan:      * CVA (cerebral vascular accident)  Likely d/t hypotensive episodes.  Cont supportive  care  Family doesn't want peg tube  Pt now with cmfort measures only.       Comfort measures only status    Continue comfort measures.         VTE Risk Mitigation (From admission, onward)      None            Discharge Planning   CARLITOS:      Code Status: DNR   Is the patient medically ready for discharge?:     Reason for patient still in hospital (select all that apply): Treatment  Discharge Plan A: Other                  Elvin Nguyễn MD  Department of Hospital Medicine   [facility]

## 2023-02-11 NOTE — NURSING
0750 Patient resting in bed. Arouses to touch. Daughter requesting patient to have Morphine IV. Resp rate 16. Morphine 2mg IVP given diluted. Tolerated well. Daughter refused for patient to be turned.

## 2023-02-11 NOTE — NURSING
Daughter at bedside- requests hold will check VS at max twice during shift and PRN with family permission. Patient: eyes closed,  chest rising/falling, in bed, restin, 3/4 side rails up, bed locked, and in lowest position. FLACC scale used: 0(currently). Continuing to monitor

## 2023-02-11 NOTE — PLAN OF CARE
Problem: Adult Inpatient Plan of Care  Goal: Plan of Care Review  Outcome: Ongoing, Progressing  Goal: Patient-Specific Goal (Individualized)  Outcome: Ongoing, Progressing  Goal: Absence of Hospital-Acquired Illness or Injury  Outcome: Ongoing, Progressing  Goal: Optimal Comfort and Wellbeing  Outcome: Ongoing, Progressing  Goal: Readiness for Transition of Care  Outcome: Ongoing, Progressing     Problem: Fall Injury Risk  Goal: Absence of Fall and Fall-Related Injury  Outcome: Ongoing, Progressing     Problem: Impaired Wound Healing  Goal: Optimal Wound Healing  Outcome: Ongoing, Progressing     Problem: Skin Injury Risk Increased  Goal: Skin Health and Integrity  Outcome: Ongoing, Progressing     Problem: Bleeding (Sepsis/Septic Shock)  Goal: Absence of Bleeding  Outcome: Ongoing, Progressing     Problem: Glycemic Control Impaired (Sepsis/Septic Shock)  Goal: Blood Glucose Level Within Desired Range  Outcome: Ongoing, Progressing     Problem: Infection Progression (Sepsis/Septic Shock)  Goal: Absence of Infection Signs and Symptoms  Outcome: Ongoing, Progressing     Problem: Infection  Goal: Absence of Infection Signs and Symptoms  Outcome: Ongoing, Progressing     Problem: Adjustment to Illness (Sepsis/Septic Shock)  Goal: Optimal Coping  Outcome: Unable to Meet, Plan Revised     Problem: Nutrition Impaired (Sepsis/Septic Shock)  Goal: Optimal Nutrition Intake  Outcome: Unable to Meet, Plan Revised

## 2023-02-11 NOTE — PROGRESS NOTES
[facility] Medicine  Progress Note    Patient Name: Kymberly Franco  MRN: [id number]  Patient Class: IP- Inpatient   Admission Date: 2/6/2023  Length of Stay: 5 days  Attending Physician: Elvin Nguyễn MD  Primary Care Provider: Andry Galarza MD        Subjective:     Principal Problem:CVA (cerebral vascular accident)        HPI:  Pt is a 89 y/o f w/ recent fall resulting in L hip fracture, pt underwent surgical repair w/ orthopedic surgeon, post operatively pt did not waken up quite much. Had multiple episodes of hypotensive events post surgery. Initially it was though to be 2/2 over sedation vs pain medications, however MRI done revealed multiple infarcts, spoke to neurologist at [facility], who reviewed imaging and thought the infarcts more than likely represent d/t watershed areas being hypoperfused, which correlated w/ the timing post operatively when her bp was running very low. Per neurologist these types of infacrts occure d/t somone having a cardiac arrest or very low perfusing pressure. Neurologist just recommended supportive care. D/w family in detail, they understand she has a very poor prognosis and intend to do hospice/comfort measures ultimately until the patietns son arrives. Pt now having sepsis, likely d/t aspirating events. Pt will now be a/w sepsis to [facility] for further care.       Overview/Hospital Course:  No notes on file    No new subjective & objective note has been filed under this hospital service since the last note was generated.    02/11 2023   No new issues today  Patient resting comfortably in no acute distress family is at the bedside today.  Family is asking for comfort care measures lungs clear with poor effort cardiovascular S1-S2 regular rate rhythm abdomen is soft positive bowel sounds extremities slightly edematous  Comfort care measures.  Assessment/Plan:      * CVA (cerebral vascular accident)  Likely d/t hypotensive  episodes.  Cont supportive care  Family doesn't want peg tube  Pt now with cmfort measures only.       Comfort measures only status    Continue comfort measures.         VTE Risk Mitigation (From admission, onward)      None            Discharge Planning   CARLITOS:      Code Status: DNR   Is the patient medically ready for discharge?:     Reason for patient still in hospital (select all that apply): Treatment  Discharge Plan A: Other                  Elvin Nguyễn MD  Department of Hospital Medicine   [facility]

## 2023-02-12 NOTE — NURSING
Daughter requesting Ativan IV for patient. Resp rate 16. Ativan 2 mg IVP given diluted. Tolerated well. Daughter refused for patient to be turned.

## 2023-02-12 NOTE — NURSING
Patient eyes closed, chest rising falling, FLACC: 0.; RR: remain at 13 breaths per minute. Daughter requesting PRN shot be given. Continuing to monitor.

## 2023-02-12 NOTE — NURSING
1100 Daughter requested for patient to have Ativan IVP. Ativan 2mg IVP given diluted. Tolerated. Refused for patient to be turned.

## 2023-02-12 NOTE — NURSING
Patient resting at check. Daughter requesting Morphine. IV for patient. Resp rate 16. Morphine 2 mg IVP given diluted. Tolerated well. Daughter refused for patient to be turned.

## 2023-02-12 NOTE — PLAN OF CARE
Problem: Adult Inpatient Plan of Care  Goal: Readiness for Transition of Care  Outcome: Ongoing, Progressing     Problem: Adult Inpatient Plan of Care  Goal: Optimal Comfort and Wellbeing  Outcome: Ongoing, Progressing

## 2023-02-12 NOTE — NURSING
Patient eyes closed, chest rising falling, FLACC: 0.; RR: 16. Daughter requesting Lorazepam IV PRN dose be given. Continuing to monitor.

## 2023-02-12 NOTE — PROGRESS NOTES
[facility] Medicine  Progress Note    Patient Name: Kymberly Franco  MRN: [id number]  Patient Class: IP- Inpatient   Admission Date: 2/6/2023  Length of Stay: 6 days  Attending Physician: lEvin Nguyễn MD  Primary Care Provider: Andry Galarza MD        Subjective:     Principal Problem:CVA (cerebral vascular accident)        HPI:  Pt is a 89 y/o f w/ recent fall resulting in L hip fracture, pt underwent surgical repair w/ orthopedic surgeon, post operatively pt did not waken up quite much. Had multiple episodes of hypotensive events post surgery. Initially it was though to be 2/2 over sedation vs pain medications, however MRI done revealed multiple infarcts, spoke to neurologist at [facility], who reviewed imaging and thought the infarcts more than likely represent d/t watershed areas being hypoperfused, which correlated w/ the timing post operatively when her bp was running very low. Per neurologist these types of infacrts occure d/t somone having a cardiac arrest or very low perfusing pressure. Neurologist just recommended supportive care. D/w family in detail, they understand she has a very poor prognosis and intend to do hospice/comfort measures ultimately until the patietns son arrives. Pt now having sepsis, likely d/t aspirating events. Pt will now be a/w sepsis to [facility] for further care.       Overview/Hospital Course:  No notes on file    No new subjective & objective note has been filed under this hospital service since the last note was generated.    02/122023   Family just wants to keep the patient comfortable.  There is no evidence of respiratory distress.  No new issues today  Patient resting comfortably in no acute distress family is at the bedside today.  Family is asking for comfort care measures lungs clear with poor effort cardiovascular S1-S2 regular rate rhythm abdomen is soft positive bowel sounds extremities slightly edematous  Comfort care  measures.  Assessment/Plan:      * CVA (cerebral vascular accident)  Likely d/t hypotensive episodes.  Cont supportive care  Family doesn't want peg tube  Pt now with cmfort measures only.       Comfort measures only status    Continue comfort measures.         VTE Risk Mitigation (From admission, onward)      None            Discharge Planning   CARLITOS:      Code Status: DNR   Is the patient medically ready for discharge?:     Reason for patient still in hospital (select all that apply): Treatment  Discharge Plan A: Other                  Elvin Nguyễn MD  Department of Hospital Medicine   [facility]

## 2023-02-12 NOTE — NURSING
Patient eyes closed, chest rising falling, FLACC: 0.; RR: 13. Daughter requesting Morphine IV PRN dose be given. Continuing to monitor.

## 2023-02-12 NOTE — NURSING
Patient eyes closed, chest rising falling, FLACC: 0.; RR: 16. Daughter (in room) requesting Morphine IV PRN dose be given. Continuing to monitor.

## 2023-02-12 NOTE — NURSING
Resting in bed. Daughter requesting Ativan IV for patient. Resp rate 16. Ativan 2 mg IVP given diluted. Tolerated well. Daughter refused for patient to be turned.

## 2023-02-12 NOTE — NURSING
1245 Daughter requesting Morphine IV for patient  Resp rate 16. Morphine 2 mg IVP given diluted. Tolerated well. Refused for patient to be turned.

## 2023-02-13 NOTE — PLAN OF CARE
Problem: Adult Inpatient Plan of Care  Goal: Plan of Care Review  Outcome: Ongoing, Progressing     Problem: Fall Injury Risk  Goal: Absence of Fall and Fall-Related Injury  Outcome: Ongoing, Progressing     Problem: Adjustment to Illness (Sepsis/Septic Shock)  Goal: Optimal Coping  Outcome: Ongoing, Progressing     Problem: Bleeding (Sepsis/Septic Shock)  Goal: Absence of Bleeding  Outcome: Ongoing, Progressing     Problem: Palliative Care  Goal: Enhanced Quality of Life  Outcome: Ongoing, Progressing  Intervention: Maximize Comfort  Flowsheets (Taken 2/12/2023 5700)  Pain Management Interventions:   care clustered   pain management plan reviewed with patient/caregiver   quiet environment facilitated     Problem: Nutrition Impaired (Sepsis/Septic Shock)  Goal: Optimal Nutrition Intake  Outcome: Unable to Meet, Plan Revised

## 2023-02-14 NOTE — NURSING
Called to patients room by daughter. Upon entering pt has no rise and fall of chest. No pulse noted and no heart rate. Will notify dr melchor.

## 2023-02-14 NOTE — PLAN OF CARE
Problem: Adult Inpatient Plan of Care  Goal: Plan of Care Review  Outcome: Ongoing, Progressing  Goal: Patient-Specific Goal (Individualized)  Outcome: Ongoing, Progressing  Goal: Absence of Hospital-Acquired Illness or Injury  Outcome: Ongoing, Progressing  Goal: Optimal Comfort and Wellbeing  Outcome: Ongoing, Progressing  Goal: Readiness for Transition of Care  Outcome: Ongoing, Progressing     Problem: Fall Injury Risk  Goal: Absence of Fall and Fall-Related Injury  Outcome: Ongoing, Progressing     Problem: Impaired Wound Healing  Goal: Optimal Wound Healing  Outcome: Ongoing, Progressing     Problem: Skin Injury Risk Increased  Goal: Skin Health and Integrity  Outcome: Ongoing, Progressing     Problem: Adjustment to Illness (Sepsis/Septic Shock)  Goal: Optimal Coping  Outcome: Ongoing, Progressing     Problem: Bleeding (Sepsis/Septic Shock)  Goal: Absence of Bleeding  Outcome: Ongoing, Progressing

## 2023-02-14 NOTE — PLAN OF CARE
[facility]  Discharge Final Note    Primary Care Provider: Andry Galarza MD    Expected Discharge Date:     Final Discharge Note (most recent)       Final Note - 23 1338          Final Note    Assessment Type Final Discharge Note     Anticipated Discharge Disposition                      Important Message from Medicare  Important Message from Medicare regarding Discharge Appeal Rights: Given to patient/caregiver, Explained to patient/caregiver, Signed/date by patient/caregiver     Date IMM was signed: 23  Time IMM was signed: 1335      Pt was on comfort measures and  to day.

## 2023-02-14 NOTE — PLAN OF CARE
Problem: Adult Inpatient Plan of Care  Goal: Plan of Care Review  Outcome: Ongoing, Not Progressing  Goal: Patient-Specific Goal (Individualized)  Outcome: Ongoing, Not Progressing  Goal: Absence of Hospital-Acquired Illness or Injury  Outcome: Ongoing, Not Progressing  Goal: Optimal Comfort and Wellbeing  Outcome: Ongoing, Not Progressing  Goal: Readiness for Transition of Care  Outcome: Ongoing, Not Progressing     Problem: Fall Injury Risk  Goal: Absence of Fall and Fall-Related Injury  Outcome: Ongoing, Not Progressing     Problem: Impaired Wound Healing  Goal: Optimal Wound Healing  Outcome: Ongoing, Not Progressing     Problem: Skin Injury Risk Increased  Goal: Skin Health and Integrity  Outcome: Ongoing, Not Progressing     Problem: Adjustment to Illness (Sepsis/Septic Shock)  Goal: Optimal Coping  Outcome: Ongoing, Not Progressing     Problem: Bleeding (Sepsis/Septic Shock)  Goal: Absence of Bleeding  Outcome: Ongoing, Not Progressing     Problem: Glycemic Control Impaired (Sepsis/Septic Shock)  Goal: Blood Glucose Level Within Desired Range  Outcome: Ongoing, Not Progressing     Problem: Infection Progression (Sepsis/Septic Shock)  Goal: Absence of Infection Signs and Symptoms  Outcome: Ongoing, Not Progressing     Problem: Nutrition Impaired (Sepsis/Septic Shock)  Goal: Optimal Nutrition Intake  Outcome: Ongoing, Not Progressing     Problem: Infection  Goal: Absence of Infection Signs and Symptoms  Outcome: Ongoing, Not Progressing     Problem: Gas Exchange Impaired  Goal: Optimal Gas Exchange  Outcome: Ongoing, Not Progressing     Problem: Palliative Care  Goal: Enhanced Quality of Life  Outcome: Ongoing, Not Progressing

## 2023-02-14 NOTE — PROGRESS NOTES
[facility] Medicine  Progress Note    Patient Name: Kymberly Franco  MRN: [id number]  Patient Class: IP- Inpatient   Admission Date: 2/6/2023  Length of Stay: 7 days  Attending Physician: Elvin Galvan Jr., MD  Primary Care Provider: Andry Galarza MD        Subjective:     Principal Problem:CVA (cerebral vascular accident)        HPI:  Pt is a 89 y/o f w/ recent fall resulting in L hip fracture, pt underwent surgical repair w/ orthopedic surgeon, post operatively pt did not waken up quite much. Had multiple episodes of hypotensive events post surgery. Initially it was though to be 2/2 over sedation vs pain medications, however MRI done revealed multiple infarcts, spoke to neurologist at [facility], who reviewed imaging and thought the infarcts more than likely represent d/t watershed areas being hypoperfused, which correlated w/ the timing post operatively when her bp was running very low. Per neurologist these types of infacrts occure d/t somone having a cardiac arrest or very low perfusing pressure. Neurologist just recommended supportive care. D/w family in detail, they understand she has a very poor prognosis and intend to do hospice/comfort measures ultimately until the patietns son arrives. Pt now having sepsis, likely d/t aspirating events. Pt will now be a/w sepsis to [facility] for further care.       Overview/Hospital Course:  No notes on file    Interval History: Patient unresponsive.  Daughter present. They are both known to me from prior admission.  Confirmed family's wishes for comfort care.      Review of Systems  Objective:     Vital Signs (Most Recent):  Temp: 98.4 °F (36.9 °C) (02/13/23 0740)  Pulse: (!) 131 (02/13/23 0740)  Resp: 11 (02/13/23 1744)  BP: 129/84 (02/13/23 0740)  SpO2: (!) 94 % (02/13/23 0740)   Vital Signs (24h Range):  Temp:  [98.4 °F (36.9 °C)-98.6 °F (37 °C)] 98.4 °F (36.9 °C)  Pulse:  [131-147] 131  Resp:  [7-13] 11  SpO2:  [94 %-96 %]  94 %  BP: (129-134)/(84-89) 129/84     Weight: 68.6 kg (151 lb 3.8 oz)  Body mass index is 27.66 kg/m².    Intake/Output Summary (Last 24 hours) at 2/13/2023 2016  Last data filed at 2/13/2023 0559  Gross per 24 hour   Intake 55 ml   Output 200 ml   Net -145 ml      Physical Exam  Vitals reviewed.   Constitutional:       General: She is not in acute distress.     Appearance: She is ill-appearing.   HENT:      Head: Atraumatic.   Cardiovascular:      Rate and Rhythm: Tachycardia present. Rhythm irregular.   Pulmonary:      Effort: Pulmonary effort is normal. No respiratory distress.      Breath sounds: Normal breath sounds.      Comments: Somewhat agonal breaths.   Abdominal:      General: Abdomen is flat. Bowel sounds are normal. There is no distension.      Palpations: Abdomen is soft.      Tenderness: There is no abdominal tenderness.   Skin:     General: Skin is warm and dry.      Coloration: Skin is not jaundiced.   Neurological:      Comments: Unresponsive to voice or light touch.       Significant Labs: All pertinent labs within the past 24 hours have been reviewed.  BMP: No results for input(s): GLU, NA, K, CL, CO2, BUN, CREATININE, CALCIUM, MG in the last 48 hours.  CBC: No results for input(s): WBC, HGB, HCT, PLT in the last 48 hours.    Significant Imaging: I have reviewed all pertinent imaging results/findings within the past 24 hours.      Assessment/Plan:      * CVA (cerebral vascular accident)  Likely d/t hypotensive episodes.  Cont supportive care  Family doesn't want peg tube  Pt now with cmfort measures only.     Generally morphine and ativan are working effectively.  However seems uncomfortable at end of dose.  Adding duragesic 12.       Comfort measures only status    Continue comfort measures.         VTE Risk Mitigation (From admission, onward)    None          Discharge Planning   CARLITOS:      Code Status: DNR   Is the patient medically ready for discharge?:     Reason for patient still in  hospital (select all that apply): Treatment  Discharge Plan A: Other                  Elvin Galvan Jr, MD  Department of Hospital Medicine   [facility]

## 2023-02-14 NOTE — SUBJECTIVE & OBJECTIVE
Interval History: Patient unresponsive.  Daughter present. They are both known to me from prior admission.  Confirmed family's wishes for comfort care.      Review of Systems  Objective:     Vital Signs (Most Recent):  Temp: 98.4 °F (36.9 °C) (02/13/23 0740)  Pulse: (!) 131 (02/13/23 0740)  Resp: 11 (02/13/23 1744)  BP: 129/84 (02/13/23 0740)  SpO2: (!) 94 % (02/13/23 0740)   Vital Signs (24h Range):  Temp:  [98.4 °F (36.9 °C)-98.6 °F (37 °C)] 98.4 °F (36.9 °C)  Pulse:  [131-147] 131  Resp:  [7-13] 11  SpO2:  [94 %-96 %] 94 %  BP: (129-134)/(84-89) 129/84     Weight: 68.6 kg (151 lb 3.8 oz)  Body mass index is 27.66 kg/m².    Intake/Output Summary (Last 24 hours) at 2/13/2023 2016  Last data filed at 2/13/2023 0559  Gross per 24 hour   Intake 55 ml   Output 200 ml   Net -145 ml      Physical Exam  Vitals reviewed.   Constitutional:       General: She is not in acute distress.     Appearance: She is ill-appearing.   HENT:      Head: Atraumatic.   Cardiovascular:      Rate and Rhythm: Tachycardia present. Rhythm irregular.   Pulmonary:      Effort: Pulmonary effort is normal. No respiratory distress.      Breath sounds: Normal breath sounds.      Comments: Somewhat agonal breaths.   Abdominal:      General: Abdomen is flat. Bowel sounds are normal. There is no distension.      Palpations: Abdomen is soft.      Tenderness: There is no abdominal tenderness.   Skin:     General: Skin is warm and dry.      Coloration: Skin is not jaundiced.   Neurological:      Comments: Unresponsive to voice or light touch.       Significant Labs: All pertinent labs within the past 24 hours have been reviewed.  BMP: No results for input(s): GLU, NA, K, CL, CO2, BUN, CREATININE, CALCIUM, MG in the last 48 hours.  CBC: No results for input(s): WBC, HGB, HCT, PLT in the last 48 hours.    Significant Imaging: I have reviewed all pertinent imaging results/findings within the past 24 hours.

## 2023-02-14 NOTE — HOSPITAL COURSE
Patient is a 89 y/o female initially admitted to [facility] on  with L hip fracture. She had operative repair on .  Postoperatively the patient was noted to have decreased level of consciousness that was initially though to be medication related.  When mental status change did not improve imaging was performed including MRI which showed multiple bilateral cerebral and cerebellar infarcts.  She was transferred to [facility]  and family elected palliative care.  Narcotics and anxiolytics where used to ensure comfort. She was DNR.  On the morning of  the patient  peacfeully with daughter at bedside.  I pronounced the patient at 824 am.

## 2023-02-14 NOTE — ASSESSMENT & PLAN NOTE
Likely d/t hypotensive episodes.  Cont supportive care  Family doesn't want peg tube  Pt now with cmfort measures only.     Generally morphine and ativan are working effectively.  However seems uncomfortable at end of dose.  Adding duragesic 12.

## 2024-01-26 NOTE — TELEPHONE ENCOUNTER
Insurance doesn't cover Bystolic 5mg like it does for generic; Looked back in [facility] and patient has taken the generic form of Bystolic   Please advise.

## 2024-04-24 NOTE — DISCHARGE SUMMARY
Microalbuminuria  HLD worse- 12% risk  See me   [facility] Medicine  Discharge Summary      Patient Name: Kymberly Franco  MRN: [id number]  NERY: [id number]  Patient Class: IP- Inpatient  Admission Date: 2/6/2023  Hospital Length of Stay: 8 days  Discharge Date and Time: No discharge date for patient encounter.  Attending Physician: Elvin Galvan Jr., MD   Discharging Provider: Elvin Galvan Jr, MD  Primary Care Provider: Andry Galarza MD    Primary Care Team: Networked reference to record PCT     HPI:   Pt is a 89 y/o f w/ recent fall resulting in L hip fracture, pt underwent surgical repair w/ orthopedic surgeon, post operatively pt did not waken up quite much. Had multiple episodes of hypotensive events post surgery. Initially it was though to be 2/2 over sedation vs pain medications, however MRI done revealed multiple infarcts, spoke to neurologist at [facility], who reviewed imaging and thought the infarcts more than likely represent d/t watershed areas being hypoperfused, which correlated w/ the timing post operatively when her bp was running very low. Per neurologist these types of infacrts occure d/t somone having a cardiac arrest or very low perfusing pressure. Neurologist just recommended supportive care. D/w family in detail, they understand she has a very poor prognosis and intend to do hospice/comfort measures ultimately until the patietns son arrives. Pt now having sepsis, likely d/t aspirating events. Pt will now be a/w sepsis to [facility] for further care.       * No surgery found *      Hospital Course:   Patient is a 89 y/o female initially admitted to [facility] on 1/26 with L hip fracture. She had operative repair on 1/27.  Postoperatively the patient was noted to have decreased level of consciousness that was initially though to be medication related.  When mental status change did not improve imaging was performed including MRI which showed multiple bilateral cerebral and cerebellar infarcts.  She was  transferred to [facility]  and family elected palliative care.  Narcotics and anxiolytics where used to ensure comfort. She was DNR.  On the morning of  the patient  peacfeully with daughter at bedside.  I pronounced the patient at 824 am.         Goals of Care Treatment Preferences:  Code Status: DNR      Consults:   Consults (From admission, onward)        Status Ordering Provider     Inpatient consult to Registered Dietitian/Nutritionist  Once        Provider:  (Not yet assigned)    Completed SYLVIA, REHMAT U          * CVA (cerebral vascular accident)  Patient now .          Final Active Diagnoses:    Diagnosis Date Noted POA    PRINCIPAL PROBLEM:  CVA (cerebral vascular accident) [I63.9] 2023 No    Comfort measures only status [Z51.5] 2023 Not Applicable      Problems Resolved During this Admission:    Diagnosis Date Noted Date Resolved POA    Sepsis [A41.9] 2023 Yes       Discharged Condition:     Disposition:     Follow Up:    Patient Instructions:   No discharge procedures on file.    Significant Diagnostic Studies: Labs: All labs within the past 24 hours have been reviewed    Pending Diagnostic Studies:     Procedure Component Value Units Date/Time    EXTRA TUBES [[id number]] Collected: 23    Order Status: Sent Lab Status: In process Updated: 23    Specimen: Blood, Venous     Narrative:      The following orders were created for panel order EXTRA TUBES.  Procedure                               Abnormality         Status                     ---------                               -----------         ------                     Lavender Top Hold[[id number]]                                In process                   Please view results for these tests on the individual orders.         Medications:  None    Indwelling Lines/Drains at time of discharge:   Lines/Drains/Airways     Drain  Duration                Urethral Catheter  02/04/23 1959 Silicone 16 Fr. 9 days                Time spent on the discharge of patient: 35 minutes         Elvin Galvan Jr, MD  Department of Hospital Medicine  [facility]

## (undated) DEVICE — BIT DRILL 4.2MM X 130MM

## (undated) DEVICE — GLOVE BIOGEL SKINSENSE PI 6.0

## (undated) DEVICE — GUIDE WIRE 3.0X1000MM BALL TIP
Type: IMPLANTABLE DEVICE | Site: HIP | Status: NON-FUNCTIONAL
Removed: 2023-01-27

## (undated) DEVICE — GLOVE BIOGEL SKINSENSE PI 7.5

## (undated) DEVICE — STRAP CATH ELASTIC HOOK&LOOP

## (undated) DEVICE — SOL NACL IRR 1000ML BTL

## (undated) DEVICE — REAMER MOD BIXCUT 8X48MM STER.

## (undated) DEVICE — SUT 2-0 VICRYL / CT-1

## (undated) DEVICE — GLOVE BIOGEL SKINSENSE PI 8.0

## (undated) DEVICE — GLOVE 8.5 PROTEXIS PI BLUE

## (undated) DEVICE — CDS TABLE FRACTURE

## (undated) DEVICE — ELECTRODE REM POLYHESIVE II

## (undated) DEVICE — SKIN STAPLER PMR35

## (undated) DEVICE — APPLICATOR CHLORAPREP ORN 26ML

## (undated) DEVICE — GLOVE 7.5 PROTEXIS PI BLUE

## (undated) DEVICE — GUIDEWIRE 3.2 X 450
Type: IMPLANTABLE DEVICE | Site: HIP | Status: NON-FUNCTIONAL
Removed: 2023-01-27

## (undated) DEVICE — GOWN NONREINF SET-IN SLV 2XL

## (undated) DEVICE — GLOVE 6.5 PROTEXIS PI BLUE